# Patient Record
Sex: FEMALE | Race: WHITE | Employment: FULL TIME | URBAN - METROPOLITAN AREA
[De-identification: names, ages, dates, MRNs, and addresses within clinical notes are randomized per-mention and may not be internally consistent; named-entity substitution may affect disease eponyms.]

---

## 2017-01-12 ENCOUNTER — OFFICE VISIT (OUTPATIENT)
Dept: OBGYN CLINIC | Facility: CLINIC | Age: 27
End: 2017-01-12

## 2017-01-12 VITALS
BODY MASS INDEX: 26.82 KG/M2 | DIASTOLIC BLOOD PRESSURE: 52 MMHG | WEIGHT: 151.38 LBS | SYSTOLIC BLOOD PRESSURE: 110 MMHG | HEIGHT: 63 IN

## 2017-01-12 DIAGNOSIS — Z34.03 ENCOUNTER FOR SUPERVISION OF NORMAL FIRST PREGNANCY IN THIRD TRIMESTER: ICD-10-CM

## 2017-01-12 DIAGNOSIS — Z3A.33 33 WEEKS GESTATION OF PREGNANCY: Primary | ICD-10-CM

## 2017-01-12 NOTE — PROGRESS NOTES
BRIGIDO  Doing well, +FM  Denies VB/LOF/uctx  Mode of delivery:   anticipated  RTC 2  Weeks  gbbs on rtc

## 2017-01-12 NOTE — PATIENT INSTRUCTIONS
FETAL MOVEMENT CHART    Begin counting the baby's movements when you awake in the morning, or at approximately 9:00 a.m. Count ten separate times that the baby moves. A movement can be either a kick, a swish, a turn or a flip of the baby inside.     Kassy Hernandez

## 2017-01-18 ENCOUNTER — HOSPITAL ENCOUNTER (INPATIENT)
Facility: HOSPITAL | Age: 27
LOS: 2 days | Discharge: HOME | End: 2017-01-20
Attending: OBSTETRICS & GYNECOLOGY | Admitting: OBSTETRICS & GYNECOLOGY
Payer: COMMERCIAL

## 2017-01-18 PROBLEM — Z34.90 PREGNANCY: Status: ACTIVE | Noted: 2017-01-18

## 2017-01-18 PROBLEM — Z34.90 PREGNANCY (HCC): Status: ACTIVE | Noted: 2017-01-18

## 2017-01-18 LAB
ANTIBODY SCREEN: NEGATIVE
BILIRUBIN URINE: NEGATIVE
BLOOD URINE: NEGATIVE
CONTROL RUN WITHIN 24 HOURS?: YES
ERYTHROCYTE [DISTWIDTH] IN BLOOD BY AUTOMATED COUNT: 11.9 % (ref 11.5–16)
GLUCOSE URINE: NEGATIVE
HCT VFR BLD AUTO: 33.9 % (ref 34–50)
HGB BLD-MCNC: 12.4 G/DL (ref 12–16)
KETONE URINE: NEGATIVE
LEUKOCYTE ESTERASE URINE: NEGATIVE
MCH RBC QN AUTO: 33.2 PG (ref 27–33.2)
MCHC RBC AUTO-ENTMCNC: 36.6 G/DL (ref 31–37)
MCV RBC AUTO: 90.6 FL (ref 81–100)
NITRITE URINE: NEGATIVE
PH URINE: 6.5 (ref 5–8)
PLATELET # BLD AUTO: 127 10(3)UL (ref 150–450)
RBC # BLD AUTO: 3.74 X10(6)UL (ref 3.8–5.1)
RED CELL DISTRIBUTION WIDTH-SD: 39.4 FL (ref 35.1–46.3)
RH BLOOD TYPE: POSITIVE
SPEC GRAVITY: 1.01 (ref 1–1.03)
T PALLIDUM AB SER QL IA: NONREACTIVE
URINE CLARITY: CLEAR
URINE COLOR: YELLOW
UROBILINOGEN URINE: 0.2
WBC # BLD AUTO: 7.5 X10(3) UL (ref 4–13)

## 2017-01-18 PROCEDURE — 0KQM0ZZ REPAIR PERINEUM MUSCLE, OPEN APPROACH: ICD-10-PCS | Performed by: OBSTETRICS & GYNECOLOGY

## 2017-01-18 PROCEDURE — 59400 OBSTETRICAL CARE: CPT | Performed by: OBSTETRICS & GYNECOLOGY

## 2017-01-18 RX ORDER — ONDANSETRON 2 MG/ML
4 INJECTION INTRAMUSCULAR; INTRAVENOUS EVERY 6 HOURS PRN
Status: DISCONTINUED | OUTPATIENT
Start: 2017-01-18 | End: 2017-01-20

## 2017-01-18 RX ORDER — BISACODYL 10 MG
10 SUPPOSITORY, RECTAL RECTAL ONCE AS NEEDED
Status: ACTIVE | OUTPATIENT
Start: 2017-01-18 | End: 2017-01-18

## 2017-01-18 RX ORDER — DIAPER,BRIEF,INFANT-TODD,DISP
EACH MISCELLANEOUS EVERY 6 HOURS PRN
Status: DISCONTINUED | OUTPATIENT
Start: 2017-01-18 | End: 2017-01-20

## 2017-01-18 RX ORDER — DOCUSATE SODIUM 100 MG/1
100 CAPSULE, LIQUID FILLED ORAL
Status: DISCONTINUED | OUTPATIENT
Start: 2017-01-18 | End: 2017-01-20

## 2017-01-18 RX ORDER — ZOLPIDEM TARTRATE 5 MG/1
5 TABLET ORAL NIGHTLY PRN
Status: DISCONTINUED | OUTPATIENT
Start: 2017-01-18 | End: 2017-01-20

## 2017-01-18 RX ORDER — TERBUTALINE SULFATE 1 MG/ML
0.25 INJECTION, SOLUTION SUBCUTANEOUS AS NEEDED
Status: DISCONTINUED | OUTPATIENT
Start: 2017-01-18 | End: 2017-01-18

## 2017-01-18 RX ORDER — IBUPROFEN 600 MG/1
600 TABLET ORAL EVERY 6 HOURS
Status: DISCONTINUED | OUTPATIENT
Start: 2017-01-18 | End: 2017-01-20

## 2017-01-18 RX ORDER — IBUPROFEN 600 MG/1
600 TABLET ORAL ONCE AS NEEDED
Status: DISCONTINUED | OUTPATIENT
Start: 2017-01-18 | End: 2017-01-18

## 2017-01-18 RX ORDER — HYDROCODONE BITARTRATE AND ACETAMINOPHEN 5; 325 MG/1; MG/1
2 TABLET ORAL EVERY 4 HOURS PRN
Status: DISCONTINUED | OUTPATIENT
Start: 2017-01-18 | End: 2017-01-20

## 2017-01-18 RX ORDER — SIMETHICONE 80 MG
80 TABLET,CHEWABLE ORAL 3 TIMES DAILY PRN
Status: DISCONTINUED | OUTPATIENT
Start: 2017-01-18 | End: 2017-01-20

## 2017-01-18 RX ORDER — HYDROCODONE BITARTRATE AND ACETAMINOPHEN 5; 325 MG/1; MG/1
1 TABLET ORAL EVERY 4 HOURS PRN
Status: DISCONTINUED | OUTPATIENT
Start: 2017-01-18 | End: 2017-01-20

## 2017-01-18 RX ORDER — ACETAMINOPHEN 325 MG/1
650 TABLET ORAL EVERY 4 HOURS PRN
Status: DISCONTINUED | OUTPATIENT
Start: 2017-01-18 | End: 2017-01-20

## 2017-01-18 RX ORDER — DEXTROSE, SODIUM CHLORIDE, SODIUM LACTATE, POTASSIUM CHLORIDE, AND CALCIUM CHLORIDE 5; .6; .31; .03; .02 G/100ML; G/100ML; G/100ML; G/100ML; G/100ML
INJECTION, SOLUTION INTRAVENOUS AS NEEDED
Status: DISCONTINUED | OUTPATIENT
Start: 2017-01-18 | End: 2017-01-18

## 2017-01-18 RX ORDER — SODIUM CHLORIDE, SODIUM LACTATE, POTASSIUM CHLORIDE, CALCIUM CHLORIDE 600; 310; 30; 20 MG/100ML; MG/100ML; MG/100ML; MG/100ML
INJECTION, SOLUTION INTRAVENOUS CONTINUOUS
Status: DISCONTINUED | OUTPATIENT
Start: 2017-01-18 | End: 2017-01-18

## 2017-01-18 NOTE — PROGRESS NOTES
Patient slowed breathing pattern/technique. Chest discomfort improved. VS reassessed, pulse ox remains at 100%--Dc'd. Cont. To c/o waves of nausea.

## 2017-01-18 NOTE — H&P
[facility] Shankar Patient Status:  Inpatient    1990 MRN [id number]   [facility] 1NW-A Attending Cary Mcneil MD    0 PCP PHYSICIAN NONSTAFF     CC: patient is here for SROM    SUBJECTIVE 1/17/2017 at Unknown time   Prenatal Vit-Fe Fumarate-FA (PRENATAL VITAMINS PLUS) 27-1 MG Oral Tab Take 1 tablet by mouth daily.  Disp:  Rfl:  1/17/2017 at Unknown time     Allergies:   Antihistamine Decon*        Comment:Tachycardia, as a child    OBJECTIVE

## 2017-01-18 NOTE — PROGRESS NOTES
Delivered viable baby girl at 1152 per . 34.4 gest. 5.0lb 18\" Dr. Isra Hernandez and nicu RN at bedside.

## 2017-01-18 NOTE — PLAN OF CARE
Problem: Patient/Family Goals  Goal: Patient/Family Long Term Goal  Patient’s Long Term Goal: uncomplicated     Interventions:  -   - See additional Care Plan goals for specific interventions   Outcome: Progressing  Goal: Patient/Family Short Term Goal

## 2017-01-18 NOTE — PROGRESS NOTES
admitted to room 113 with reports of SROM clear fluid at 0500. States contractions just started. Denies vaginal bleeding. + fetal movement.

## 2017-01-18 NOTE — PROGRESS NOTES
Patient up to bathroom with assist x 1. Voiding without difficulty. Korin care completed. Patient transferred to mother/baby--- will be going straight to NICU via wheelchair in stable condition. Accompanied by significant other and staff.   Report given to

## 2017-01-18 NOTE — PROGRESS NOTES
PT DROPPED HER BELONGINGS OFF IN 2199 AND WENT STRAIGHT TO NICU ACCOMPANIEDD BY GRICELDA Andrea RN TO VISIT WITH BABY.

## 2017-01-18 NOTE — PLAN OF CARE
ANXIETY    • Will report anxiety at manageable levels Completed        BIRTH - VAGINAL/ SECTION    • Fetal and maternal status remain reassuring during the birth process Completed        PAIN - ADULT    • Verbalizes/displays adequate comfort level

## 2017-01-18 NOTE — PROGRESS NOTES
Pt c/o feeling \"short of breath\" describes difficulty in taking a deep breath. VS reassessed. Pulse oximetry applied. O2 sats 100%. Lungs clear, no adventitous lungs sounds auscultated. Encouraged patient to slow breathing technique.  C/o nausea, no emesi

## 2017-01-18 NOTE — PROGRESS NOTES
PT RETURNED FROM NICU. VOIDED 350 CC'S URINE. PERICARE REINFORCED. SETTLED PT INTO BED. ASSESSMENT COMPLETED. VSS. ORIENTATED PT TO BREAST PUMP. PT WILL PUMP ONCE HER FAMILY LEAVES.

## 2017-01-19 LAB
BASOPHILS # BLD AUTO: 0.06 X10(3) UL (ref 0–0.1)
BASOPHILS NFR BLD AUTO: 0.6 %
EOSINOPHIL # BLD AUTO: 0.1 X10(3) UL (ref 0–0.3)
EOSINOPHIL NFR BLD AUTO: 1.1 %
ERYTHROCYTE [DISTWIDTH] IN BLOOD BY AUTOMATED COUNT: 12.1 % (ref 11.5–16)
HCT VFR BLD AUTO: 26.9 % (ref 34–50)
HGB BLD-MCNC: 9.8 G/DL (ref 12–16)
IMMATURE GRANULOCYTE COUNT: 0.06 X10(3) UL (ref 0–1)
IMMATURE GRANULOCYTE RATIO %: 0.6 %
LYMPHOCYTES # BLD AUTO: 1.89 X10(3) UL (ref 0.9–4)
LYMPHOCYTES NFR BLD AUTO: 20.2 %
MCH RBC QN AUTO: 33.7 PG (ref 27–33.2)
MCHC RBC AUTO-ENTMCNC: 36.4 G/DL (ref 31–37)
MCV RBC AUTO: 92.4 FL (ref 81–100)
MONOCYTES # BLD AUTO: 0.62 X10(3) UL (ref 0.1–0.6)
MONOCYTES NFR BLD AUTO: 6.6 %
NEUTROPHIL ABS PRELIM: 6.63 X10 (3) UL (ref 1.3–6.7)
NEUTROPHILS # BLD AUTO: 6.63 X10(3) UL (ref 1.3–6.7)
NEUTROPHILS NFR BLD AUTO: 70.9 %
PLATELET # BLD AUTO: 117 10(3)UL (ref 150–450)
RBC # BLD AUTO: 2.91 X10(6)UL (ref 3.8–5.1)
RED CELL DISTRIBUTION WIDTH-SD: 40.7 FL (ref 35.1–46.3)
WBC # BLD AUTO: 9.4 X10(3) UL (ref 4–13)

## 2017-01-19 NOTE — PLAN OF CARE
POSTPARTUM    • Long Term Goal:Experiences normal postpartum course Progressing    • Optimize infant feeding at the breast Progressing    • Appropriate maternal -  bonding Progressing

## 2017-01-19 NOTE — PROGRESS NOTES
PPD # 1. Doing okay. Has area just above symphysis that is tender. Not cramping. No associated symptoms Voiding without issue. Bleeding decreased from yesterday.  + Flatus. No BM. Infant daughter stable in NICU.     /79 mmHg  Pulse 50  Temp(Src)

## 2017-01-20 VITALS
SYSTOLIC BLOOD PRESSURE: 113 MMHG | DIASTOLIC BLOOD PRESSURE: 70 MMHG | WEIGHT: 151.38 LBS | HEIGHT: 62.99 IN | HEART RATE: 54 BPM | TEMPERATURE: 98 F | RESPIRATION RATE: 18 BRPM | OXYGEN SATURATION: 100 % | BODY MASS INDEX: 26.82 KG/M2

## 2017-01-20 NOTE — PLAN OF CARE
POSTPARTUM    • Long Term Goal:Experiences normal postpartum course Completed    • Optimize infant feeding at the breast Completed    • Appropriate maternal -  bonding Completed

## 2017-01-20 NOTE — PROGRESS NOTES
PPD # 2. Doing better. Voiding well, but some incontinence as she sits initially. No pain or dysuria. Goes in good amounts. Bleeding decreased.  + Flatus. No BM.     /73 mmHg  Pulse 63  Temp(Src) 97.1 °F (36.2 °C) (Axillary)  Resp 18  Ht 62.99\"

## 2017-01-20 NOTE — PROGRESS NOTES
D/C home. VSS, color pink, skin W/D. Feeding baby well. Adequate diapers. ID bands checked x3. Baby in NICU.

## 2017-01-20 NOTE — PROGRESS NOTES
Discharge teaching on mom and baby care completed. All questions and concerns addressed. Pt verbalizes good understanding on information given.  Anticipating discharge later today  Baby remains in nicu for now, doing well, mom pumping and doing sts Doing pu

## 2017-01-20 NOTE — CM/SW NOTE
01/20/17 1000   CM/SW Referral Data   Referral Source Nurse;Family; Social Work (self-referral)   Reason for Referral Discharge planning;Psychoscial assessment   Informant Patient     SW completed an assessment with pt who presents with bright affect and

## 2017-01-20 NOTE — CM/SW NOTE
CM met with patient in the NICU and reviewed insurance and PCP for infant. Patient stated that they will add infant to their insurance [employer].  CM reminded patient that she should call insurance just to let them know that she delivered infant and is inpatient,

## 2017-01-23 ENCOUNTER — TELEPHONE (OUTPATIENT)
Dept: OBGYN CLINIC | Facility: CLINIC | Age: 27
End: 2017-01-23

## 2017-01-23 ENCOUNTER — TELEPHONE (OUTPATIENT)
Dept: OBGYN UNIT | Facility: HOSPITAL | Age: 27
End: 2017-01-23

## 2017-01-23 NOTE — TELEPHONE ENCOUNTER
Patient had her baby 6 wks early, Dr. Cecily Young Discharged her 1/20/17. Still having the abdominal pain, and there is lump on her skin. Needs advise. Patient appointment for her post partum is on February 16th.

## 2017-01-23 NOTE — TELEPHONE ENCOUNTER
Pt s/p  17. Pt delivered 6 wks early. C/o palpable 1-1/2 inch \"lump\" midway between  umbilicus and pelvis. Pt reports area is tender with palpation. 4/10. Pt denies temp/chills/n/v/d.  Pt is taking ibuprofen without   Much relief but only aditi

## 2017-01-23 NOTE — PROGRESS NOTES
Reviewed self care w / mom, she verbalizes understanding of instructions reviewed. Encourage to follow up w/ MDs as directed and w/ questions/concerns. Baby remains in nicu for now. Pt denies any HTN issues during pregnancy or after delivery.

## 2017-01-24 ENCOUNTER — TELEPHONE (OUTPATIENT)
Dept: OBGYN CLINIC | Facility: CLINIC | Age: 27
End: 2017-01-24

## 2017-01-24 ENCOUNTER — OFFICE VISIT (OUTPATIENT)
Dept: OBGYN CLINIC | Facility: CLINIC | Age: 27
End: 2017-01-24

## 2017-01-24 VITALS
BODY MASS INDEX: 24.45 KG/M2 | WEIGHT: 138 LBS | HEIGHT: 63 IN | DIASTOLIC BLOOD PRESSURE: 64 MMHG | SYSTOLIC BLOOD PRESSURE: 110 MMHG | HEART RATE: 74 BPM

## 2017-01-24 DIAGNOSIS — O23.90: Primary | ICD-10-CM

## 2017-01-24 PROCEDURE — 99213 OFFICE O/P EST LOW 20 MIN: CPT | Performed by: NURSE PRACTITIONER

## 2017-01-24 RX ORDER — IBUPROFEN 600 MG/1
600 TABLET ORAL EVERY 6 HOURS PRN
COMMUNITY
End: 2017-01-31

## 2017-01-24 RX ORDER — CEPHALEXIN 500 MG/1
500 CAPSULE ORAL 2 TIMES DAILY
Qty: 14 CAPSULE | Refills: 0 | Status: SHIPPED | OUTPATIENT
Start: 2017-01-24 | End: 2017-01-31 | Stop reason: ALTCHOICE

## 2017-01-24 RX ORDER — BREAST PUMP
EACH MISCELLANEOUS
Qty: 1 EACH | Refills: 0 | OUTPATIENT
Start: 2017-01-24 | End: 2017-05-22

## 2017-01-24 NOTE — TELEPHONE ENCOUNTER
Pt requesting apt 1/24/17. Dr. Kristen Ruiz is out of office and will return on 1/25/17. PSR- please call pt to schedule post-partum prob apt with Mahnaz Ndiaye.

## 2017-01-24 NOTE — PROGRESS NOTES
S:  Patient here concerned with hard, tender lump to abdomen. States started after 34 + week  last week 17. Denies fevers, unusual discharge, or odor. Still steadily bleeding, not soaking a pad and hour. Has 1 daily blood clot.   Baby is in NICU fo

## 2017-01-24 NOTE — TELEPHONE ENCOUNTER
Sounds like hernia. Okay to see Cristopher Loosen unless delivering physician wants to see her Vera Cabral.

## 2017-01-24 NOTE — PATIENT INSTRUCTIONS
1. Antibiotics as direct  2. Ibuprofen 600 mg at least twice daily  3. Continue Korin- care as directed  4.  Call with any fevers, flu- like symptoms, increased pain or bleeding

## 2017-01-28 ENCOUNTER — APPOINTMENT (OUTPATIENT)
Dept: LACTATION | Facility: HOSPITAL | Age: 27
End: 2017-01-28
Attending: OBSTETRICS & GYNECOLOGY
Payer: COMMERCIAL

## 2017-01-31 ENCOUNTER — OFFICE VISIT (OUTPATIENT)
Dept: OBGYN CLINIC | Facility: CLINIC | Age: 27
End: 2017-01-31

## 2017-01-31 VITALS
SYSTOLIC BLOOD PRESSURE: 110 MMHG | BODY MASS INDEX: 23.74 KG/M2 | HEART RATE: 86 BPM | HEIGHT: 63 IN | WEIGHT: 134 LBS | DIASTOLIC BLOOD PRESSURE: 60 MMHG

## 2017-01-31 DIAGNOSIS — O23.90: Primary | ICD-10-CM

## 2017-01-31 NOTE — PROGRESS NOTES
Feeling much better  Finished antibiotics    Abdomen soft    PP visit in 2 weeks  Angie Gore still in NICU, doing well

## 2017-02-12 ENCOUNTER — TELEPHONE (OUTPATIENT)
Dept: OBGYN UNIT | Facility: HOSPITAL | Age: 27
End: 2017-02-12

## 2017-02-12 NOTE — PROGRESS NOTES
2 wk call. Baby having a few issues in NICU. Pt doing well. Great stay @ [facility]. Encouraged to return survey.

## 2017-02-15 ENCOUNTER — TELEPHONE (OUTPATIENT)
Dept: OBGYN CLINIC | Facility: CLINIC | Age: 27
End: 2017-02-15

## 2017-02-15 NOTE — TELEPHONE ENCOUNTER
Patient called, she notice it yesterday and up til now she has redness on her right breast and she is breast feeding. When she press on the area, it's painful. No fever. Please advise.   She has an appointment with Dr. Brandon Groves tomorrow for her post partum

## 2017-02-15 NOTE — TELEPHONE ENCOUNTER
Pt currently breastfeeding; baby in NICU. Pt c/o reddened area to underside of right breast; tender to the touch, started yesterday. Pt denies any lumps or red streaks. Pt denies fever, chills or body aches.   Pt was examined by lactation consultant today

## 2017-02-16 ENCOUNTER — OFFICE VISIT (OUTPATIENT)
Dept: OBGYN CLINIC | Facility: CLINIC | Age: 27
End: 2017-02-16

## 2017-02-16 VITALS
HEIGHT: 63 IN | SYSTOLIC BLOOD PRESSURE: 96 MMHG | BODY MASS INDEX: 24.1 KG/M2 | DIASTOLIC BLOOD PRESSURE: 60 MMHG | WEIGHT: 136 LBS

## 2017-02-16 PROBLEM — Z34.90 PREGNANCY: Status: RESOLVED | Noted: 2017-01-18 | Resolved: 2017-02-16

## 2017-02-16 PROBLEM — Z34.90 PREGNANCY (HCC): Status: RESOLVED | Noted: 2017-01-18 | Resolved: 2017-02-16

## 2017-02-16 RX ORDER — ACETAMINOPHEN AND CODEINE PHOSPHATE 120; 12 MG/5ML; MG/5ML
0.35 SOLUTION ORAL DAILY
Qty: 3 PACKAGE | Refills: 1 | Status: SHIPPED | OUTPATIENT
Start: 2017-02-16 | End: 2017-03-16

## 2017-02-16 NOTE — PROGRESS NOTES
Here for postpartum visit S/P vaginal delivery due to 34 week PPROM and active labor on January 18th. Is pumping and breast feeding. Infant still in NICU. Voiding w/o issues. BM's daily but hx of constipation in past and feels not evacuating completely.

## 2017-03-10 ENCOUNTER — NURSE ONLY (OUTPATIENT)
Dept: LACTATION | Facility: HOSPITAL | Age: 27
End: 2017-03-10
Attending: OBSTETRICS & GYNECOLOGY
Payer: COMMERCIAL

## 2017-03-10 VITALS — TEMPERATURE: 98 F

## 2017-03-10 PROCEDURE — 99212 OFFICE O/P EST SF 10 MIN: CPT

## 2017-03-10 NOTE — PATIENT INSTRUCTIONS
Breastfeeding suggestions for home  Kangaroo mother care: Snuggle your baby between your breasts in just a diaper and covered with a blanket. Helps to wake a sleepy baby and increases your milk supply. Massage your breasts before nursing or pumping.   Marisa \"guzzling\" the feeding, allowing infant to take at least 15 minutes to drink the breastmilk or formula. Milk Supply:   · Continue breast massage before nursing and pumping, skin to skin contact with baby as much as possible.    · Continue to pump one

## 2017-04-25 ENCOUNTER — HOSPITAL ENCOUNTER (OUTPATIENT)
Age: 27
Discharge: HOME | End: 2017-04-25
Payer: COMMERCIAL

## 2017-04-25 VITALS
TEMPERATURE: 98 F | HEART RATE: 80 BPM | DIASTOLIC BLOOD PRESSURE: 72 MMHG | RESPIRATION RATE: 18 BRPM | OXYGEN SATURATION: 99 % | SYSTOLIC BLOOD PRESSURE: 110 MMHG

## 2017-04-25 DIAGNOSIS — B37.9 CANDIDA INFECTION: ICD-10-CM

## 2017-04-25 DIAGNOSIS — G56.01 CARPAL TUNNEL SYNDROME, RIGHT: Primary | ICD-10-CM

## 2017-04-25 PROCEDURE — 99203 OFFICE O/P NEW LOW 30 MIN: CPT

## 2017-04-25 PROCEDURE — 99212 OFFICE O/P EST SF 10 MIN: CPT

## 2017-04-25 PROCEDURE — 99213 OFFICE O/P EST LOW 20 MIN: CPT

## 2017-04-25 PROCEDURE — 99204 OFFICE O/P NEW MOD 45 MIN: CPT

## 2017-04-25 RX ORDER — NYSTATIN 100000 U/G
1 OINTMENT TOPICAL 2 TIMES DAILY
Qty: 30 G | Refills: 0 | Status: SHIPPED | OUTPATIENT
Start: 2017-04-25 | End: 2017-05-22

## 2017-04-25 RX ORDER — IBUPROFEN 600 MG/1
600 TABLET ORAL EVERY 8 HOURS PRN
Qty: 30 TABLET | Refills: 0 | Status: SHIPPED | OUTPATIENT
Start: 2017-04-25 | End: 2017-05-02

## 2017-04-25 NOTE — ED PROVIDER NOTES
Patient Seen in: [facility] [place]    History   Patient presents with:  Numbness    Stated Complaint: HAND NUMBNESS X1 WEEK    HPI    A 61-year-old female who comes in today complete of numbness and tingling in the right hand especiall Systems    Positive for stated complaint: HAND NUMBNESS X1 WEEK  Other systems are as noted in HPI. Constitutional and vital signs reviewed. All other systems reviewed and negative except as noted above.     [facility] elements reviewed from today and agree discharge.  I discuss the plan of care with the patient, who expresses understanding.  All questions and concerns are addressed to the patient's satisfaction prior to discharge today.       Disposition and Plan     Clinical Impression:  Carpal tunnel syndro

## 2017-04-25 NOTE — ED INITIAL ASSESSMENT (HPI)
Pt has had rt hand numbness and tingling which is constant for the past week or so.   She is 3 months post partum, breastfeeding , and RN noticed she carries baby and carrier on her rt forearm

## 2017-05-22 ENCOUNTER — OFFICE VISIT (OUTPATIENT)
Dept: OBGYN CLINIC | Facility: CLINIC | Age: 27
End: 2017-05-22

## 2017-05-22 VITALS
BODY MASS INDEX: 22.86 KG/M2 | HEIGHT: 63 IN | WEIGHT: 129 LBS | SYSTOLIC BLOOD PRESSURE: 100 MMHG | HEART RATE: 98 BPM | DIASTOLIC BLOOD PRESSURE: 66 MMHG

## 2017-05-22 DIAGNOSIS — N94.11 INTROITAL DYSPAREUNIA: ICD-10-CM

## 2017-05-22 DIAGNOSIS — Z12.4 ROUTINE PAPANICOLAOU SMEAR: ICD-10-CM

## 2017-05-22 DIAGNOSIS — Z01.419 WELL WOMAN EXAM WITH ROUTINE GYNECOLOGICAL EXAM: Primary | ICD-10-CM

## 2017-05-22 PROCEDURE — 99395 PREV VISIT EST AGE 18-39: CPT | Performed by: OBSTETRICS & GYNECOLOGY

## 2017-05-22 PROCEDURE — 88175 CYTOPATH C/V AUTO FLUID REDO: CPT | Performed by: OBSTETRICS & GYNECOLOGY

## 2017-05-22 RX ORDER — NORETHINDRONE 0.35 MG/1
TABLET ORAL
COMMUNITY
Start: 2017-04-25 | End: 2017-05-22

## 2017-05-22 RX ORDER — DROSPIRENONE AND ETHINYL ESTRADIOL 0.03MG-3MG
1 KIT ORAL DAILY
Qty: 3 PACKAGE | Refills: 0 | Status: SHIPPED | OUTPATIENT
Start: 2017-05-22 | End: 2017-06-23

## 2017-05-22 NOTE — PROGRESS NOTES
Patient ID:   Corinne Hurt  is a 27year old female         HPI:     Here for general gyn exam and pap smear.  Last seen 2016 for postpartum exam. Continues to breast fed and having some post nursing engorgement as daughter going longer b child       Current Outpatient Prescriptions on File Prior to Visit:  Prenatal Vit-Fe Fumarate-FA (PRENATAL VITAMINS PLUS) 27-1 MG Oral Tab Take 1 tablet by mouth daily. Disp:  Rfl:      No current facility-administered medications on file prior to visit. In meantime, lubricant and massage in area prior to intercourse.

## 2017-05-23 ENCOUNTER — PATIENT MESSAGE (OUTPATIENT)
Dept: OBGYN CLINIC | Facility: CLINIC | Age: 27
End: 2017-05-23

## 2017-05-23 NOTE — TELEPHONE ENCOUNTER
From: Blaze Hernandez  To: Collette Dire, MD  Sent: 5/23/2017 10:29 AM CDT  Subject: Test Results Question    I just looked at my tests results page and the placenta pathology results are still not there.  Dr. Magi Reynolds said he released them to me, but I d

## 2017-06-14 ENCOUNTER — TELEPHONE (OUTPATIENT)
Dept: OBGYN CLINIC | Facility: CLINIC | Age: 27
End: 2017-06-14

## 2017-06-14 DIAGNOSIS — Z80.3 FAMILY HISTORY OF BREAST CANCER: Primary | ICD-10-CM

## 2017-06-14 NOTE — TELEPHONE ENCOUNTER
Lucille Waters info give to patient. Patient encouraged to check with her insurance to see what her out-of-pocket expenses with be.

## 2017-06-15 ENCOUNTER — TELEPHONE (OUTPATIENT)
Dept: OBGYN CLINIC | Facility: CLINIC | Age: 27
End: 2017-06-15

## 2017-06-15 NOTE — TELEPHONE ENCOUNTER
Patient delivered on 01/18/17. She stated she has been having pain in right breast.  She notices it most when she is empty. Denies any redness, streaking, fever, etc.  States she does notice a \"hard spot\" on right breast.  She is nursing & pumping.  Not

## 2017-06-15 NOTE — TELEPHONE ENCOUNTER
Pt calling and has a pain in her right breast    Pt is breast feeding  Pt has had for about 1 week    Please call

## 2017-06-23 ENCOUNTER — OFFICE VISIT (OUTPATIENT)
Dept: HEMATOLOGY/ONCOLOGY | Facility: HOSPITAL | Age: 27
End: 2017-06-23
Attending: SPECIALIST
Payer: COMMERCIAL

## 2017-06-23 VITALS
RESPIRATION RATE: 18 BRPM | DIASTOLIC BLOOD PRESSURE: 69 MMHG | SYSTOLIC BLOOD PRESSURE: 110 MMHG | TEMPERATURE: 98 F | WEIGHT: 128 LBS | BODY MASS INDEX: 23 KG/M2 | HEART RATE: 64 BPM

## 2017-06-23 DIAGNOSIS — IMO0001: ICD-10-CM

## 2017-06-23 DIAGNOSIS — Z80.3 FAMILY HISTORY OF BREAST CANCER: Primary | ICD-10-CM

## 2017-06-23 PROCEDURE — 99244 OFF/OP CNSLTJ NEW/EST MOD 40: CPT | Performed by: SPECIALIST

## 2017-06-23 NOTE — PROGRESS NOTES
Referring Provider: Kajal Harris MD    Additional Provider: Jareth Dias MD    Reason for Referral:  Robert Card was referred for genetic counseling because of a family history of breast cancer.   Ms. Roshan Jacob is a 27year-old woman of [employer] [facility] common cancers occurring at unusually young ages, multiple primary cancers in the some individual, or the same type of cancer or related cancers (e.g., breast and ovarian, colorectal and endometrial) in three or more individuals in the same lineage.   Mutat syndrome involving a different gene, or that her paternal relatives had breast cancer due to a BRCA1/2 mutation that Ms. Shankar did not inherit.   If no genetic mutation is identified, options for cancer screening/management should be determined according t was referred for genetic counseling because of a family history of breast cancer. Her reported family history is not highly suspicious for a hereditary cancer syndrome; however, Ms. Shankar’s paternal great-grandfather was of Jehovah's witness descent and Ms. Shankar’

## 2017-06-23 NOTE — PROGRESS NOTES
Patient is here today for Genetic Consult with Ariel Schwab and Renea Dangelo. Patient denies pain. Medication list and medical history were reviewed and updated.     Education Record    Learner:  Patient    Disease / Diagnosis: Genetic Consult    [place]

## 2017-07-09 NOTE — PROGRESS NOTES
[facility] Report of Consultation      Patient Name: Deonte Aponte   YOB: 1990  Medical Record Number: [id number]  Consulting Physician: Eh Campos. Rohan Barney M.D. Referring Physician: Jl Levin M.D.     Date of Consultation: 6/2 FENUGREEK OR Take 1 Dose by mouth 3 (three) times daily. Disp:  Rfl:    Prenatal Vit-Fe Fumarate-FA (PRENATAL VITAMINS PLUS) 27-1 MG Oral Tab Take 1 tablet by mouth daily. Disp:  Rfl:      Allergies   Ms. Shankar is allergic to antihistamine decongestant the genes, BRCA1 and BRCA2, account for the majority of hereditary breast and ovarian cancer families. Mutations in genes other than BRCA1/2 are identified in 3-10% of individuals tested using a multigene panel.       Ms. Shankar’s reported family history be discussed with a physician.       If Ms. Shankar is found to carry a deleterious mutation in the BRCA1/2 genes, she is at significantly increased risk for breast, ovarian, and other cancers.  This knowledge would influence her medical recommendations and 6-12 months beginning at the age of 22 years. 3. Imaging studies with annual MRI beginning at the age of 22 years. Beginning at age 27 years, annual mammography and annual MRI are advised such that breast imaging is performed every 6 months.  Screening ma descent and Ms. Shankar’s paternal grandmother and aunt had breast cancer. Genetic testing on Ms. Shankar for the three Ashkenazi [employer] BRCA1/2  mutations is indicated. Blood was drawn for this purpose.  We expect results within 3 weeks.      Planned

## 2017-07-12 ENCOUNTER — GENETICS ENCOUNTER (OUTPATIENT)
Dept: HEMATOLOGY/ONCOLOGY | Facility: HOSPITAL | Age: 27
End: 2017-07-12

## 2018-04-13 ENCOUNTER — LAB ENCOUNTER (OUTPATIENT)
Dept: LAB | Age: 28
End: 2018-04-13
Attending: INTERNAL MEDICINE
Payer: COMMERCIAL

## 2018-04-13 ENCOUNTER — OFFICE VISIT (OUTPATIENT)
Dept: INTERNAL MEDICINE CLINIC | Facility: CLINIC | Age: 28
End: 2018-04-13

## 2018-04-13 VITALS
TEMPERATURE: 99 F | HEIGHT: 63 IN | RESPIRATION RATE: 16 BRPM | OXYGEN SATURATION: 99 % | BODY MASS INDEX: 22.86 KG/M2 | WEIGHT: 129 LBS

## 2018-04-13 DIAGNOSIS — R06.00 DYSPNEA ON EXERTION: ICD-10-CM

## 2018-04-13 DIAGNOSIS — D64.9 ANEMIA, UNSPECIFIED TYPE: ICD-10-CM

## 2018-04-13 DIAGNOSIS — R42 VERTIGO: Primary | ICD-10-CM

## 2018-04-13 DIAGNOSIS — E55.9 HYPOVITAMINOSIS D: ICD-10-CM

## 2018-04-13 PROCEDURE — 99214 OFFICE O/P EST MOD 30 MIN: CPT | Performed by: INTERNAL MEDICINE

## 2018-04-13 PROCEDURE — 80053 COMPREHEN METABOLIC PANEL: CPT | Performed by: INTERNAL MEDICINE

## 2018-04-13 PROCEDURE — 36415 COLL VENOUS BLD VENIPUNCTURE: CPT | Performed by: INTERNAL MEDICINE

## 2018-04-13 PROCEDURE — 82306 VITAMIN D 25 HYDROXY: CPT | Performed by: INTERNAL MEDICINE

## 2018-04-13 PROCEDURE — 85025 COMPLETE CBC W/AUTO DIFF WBC: CPT | Performed by: INTERNAL MEDICINE

## 2018-04-13 RX ORDER — BUPRENORPHINE HCL 8 MG/1
1 TABLET SUBLINGUAL DAILY
COMMUNITY
End: 2019-02-21 | Stop reason: DRUGHIGH

## 2018-04-13 RX ORDER — MECLIZINE HYDROCHLORIDE 25 MG/1
25 TABLET ORAL 3 TIMES DAILY PRN
Qty: 20 TABLET | Refills: 0 | Status: SHIPPED | OUTPATIENT
Start: 2018-04-13 | End: 2018-04-24

## 2018-04-13 NOTE — PROGRESS NOTES
[facility] Internal Medicine Office Note  Chief Complaint:   Patient presents with:  Dizziness: all day long  Breathing Problem: when doing activities, also is shaky      HPI:   This is a 32year old female new patient coming in for dizziness  HP (CENTRUM SILVER) Oral Tab Take 1 tablet by mouth daily. Disp:  Rfl:    Meclizine HCl 25 MG Oral Tab Take 1 tablet (25 mg total) by mouth 3 (three) times daily as needed.  Disp: 20 tablet Rfl: 0         REVIEW OF SYSTEMS:   Review of Systems   Constitutional for this visit:    Vertigo - symptoms c/w vestibular neuritis. Can take meclizine for severe vertigo but discussed this may prolong symptoms. She declines anti-nausea medication.  f/u ENT for further eval  -     ENT - INTERNAL    Anemia, unspecified type -

## 2018-04-13 NOTE — PATIENT INSTRUCTIONS
Meclizine - take sparingly as needed for vertigo  Can cause side effects of drowsiness or nausea    Follow up with ENT [facility]/Dr. Ivette Del Rio or   Dr. Tami Benson [phone]

## 2018-04-17 ENCOUNTER — PATIENT MESSAGE (OUTPATIENT)
Dept: INTERNAL MEDICINE CLINIC | Facility: CLINIC | Age: 28
End: 2018-04-17

## 2018-04-17 NOTE — TELEPHONE ENCOUNTER
From: Jenny Thrasher  To: Ganga Chang MD  Sent: 4/17/2018 1:16 PM CDT  Subject: Test Results Question    Hi Dr. Hayes Parents,  I got the blood test results. I will begin taking a vitamin D supplement. Quick question.   I noticed my WBC are on the borderline-

## 2018-04-24 ENCOUNTER — OFFICE VISIT (OUTPATIENT)
Dept: OBGYN CLINIC | Facility: CLINIC | Age: 28
End: 2018-04-24

## 2018-04-24 VITALS
WEIGHT: 131 LBS | SYSTOLIC BLOOD PRESSURE: 110 MMHG | HEIGHT: 63 IN | DIASTOLIC BLOOD PRESSURE: 56 MMHG | BODY MASS INDEX: 23.21 KG/M2

## 2018-04-24 DIAGNOSIS — Z01.419 WELL WOMAN EXAM WITH ROUTINE GYNECOLOGICAL EXAM: ICD-10-CM

## 2018-04-24 DIAGNOSIS — Z30.41 ENCOUNTER FOR SURVEILLANCE OF CONTRACEPTIVE PILLS: Primary | ICD-10-CM

## 2018-04-24 DIAGNOSIS — Z12.4 CERVICAL CANCER SCREENING: ICD-10-CM

## 2018-04-24 PROCEDURE — 88175 CYTOPATH C/V AUTO FLUID REDO: CPT | Performed by: OBSTETRICS & GYNECOLOGY

## 2018-04-24 PROCEDURE — 99395 PREV VISIT EST AGE 18-39: CPT | Performed by: OBSTETRICS & GYNECOLOGY

## 2018-04-24 RX ORDER — DROSPIRENONE AND ETHINYL ESTRADIOL 0.03MG-3MG
1 KIT ORAL DAILY
COMMUNITY
End: 2018-04-24

## 2018-04-24 RX ORDER — DROSPIRENONE AND ETHINYL ESTRADIOL 0.03MG-3MG
1 KIT ORAL DAILY
Qty: 3 PACKAGE | Refills: 3 | Status: SHIPPED | OUTPATIENT
Start: 2018-04-24 | End: 2019-04-05

## 2018-04-24 NOTE — PROGRESS NOTES
[facility]  Obstetrics and Gynecology  History & Physical    CC: Patient is here for annual well woman exam     Subjective:     HPI: Jannette Davis is a 32year old  female here for annual well women exam.  Patient reports pain with inte Spouse name: N/A    Years of education: N/A  Number of children: N/A     Occupational History  None on file     Social History Main Topics   Smoking status: Never Smoker    Smokeless tobacco: Never Used    Alcohol use Yes  0.0 oz/week    2 Glasses of wine denies history of excessive bleeding or bruising      Objective:      04/24/18  1115   BP: 110/56   Weight: 131 lb   Height: 63\"       Body mass index is 23.21 kg/m².     General: AAO.NAD.   CVS exam: RRR   Chest: CTAB   Breast: symmetric, no dominant or

## 2018-05-15 ENCOUNTER — HOSPITAL ENCOUNTER (OUTPATIENT)
Dept: MRI IMAGING | Facility: HOSPITAL | Age: 28
Discharge: HOME | End: 2018-05-15
Attending: OTOLARYNGOLOGY
Payer: COMMERCIAL

## 2018-05-15 DIAGNOSIS — R42 DIZZINESS: ICD-10-CM

## 2018-05-15 PROCEDURE — A9576 INJ PROHANCE MULTIPACK: HCPCS | Performed by: OTOLARYNGOLOGY

## 2018-05-15 PROCEDURE — 70553 MRI BRAIN STEM W/O & W/DYE: CPT | Performed by: OTOLARYNGOLOGY

## 2018-05-17 ENCOUNTER — PATIENT MESSAGE (OUTPATIENT)
Dept: INTERNAL MEDICINE CLINIC | Facility: CLINIC | Age: 28
End: 2018-05-17

## 2018-05-17 ENCOUNTER — OFFICE VISIT (OUTPATIENT)
Dept: INTERNAL MEDICINE CLINIC | Facility: CLINIC | Age: 28
End: 2018-05-17

## 2018-05-17 ENCOUNTER — LAB ENCOUNTER (OUTPATIENT)
Dept: LAB | Age: 28
End: 2018-05-17
Attending: INTERNAL MEDICINE
Payer: COMMERCIAL

## 2018-05-17 VITALS
RESPIRATION RATE: 16 BRPM | TEMPERATURE: 98 F | SYSTOLIC BLOOD PRESSURE: 92 MMHG | DIASTOLIC BLOOD PRESSURE: 56 MMHG | WEIGHT: 130.75 LBS | HEIGHT: 63 IN | HEART RATE: 88 BPM | BODY MASS INDEX: 23.17 KG/M2

## 2018-05-17 DIAGNOSIS — R53.83 FATIGUE, UNSPECIFIED TYPE: ICD-10-CM

## 2018-05-17 DIAGNOSIS — R93.89 ABNORMAL MRI: Primary | ICD-10-CM

## 2018-05-17 DIAGNOSIS — R20.2 TINGLING IN EXTREMITIES: ICD-10-CM

## 2018-05-17 PROCEDURE — 99214 OFFICE O/P EST MOD 30 MIN: CPT | Performed by: INTERNAL MEDICINE

## 2018-05-17 PROCEDURE — 82607 VITAMIN B-12: CPT | Performed by: INTERNAL MEDICINE

## 2018-05-17 PROCEDURE — 85025 COMPLETE CBC W/AUTO DIFF WBC: CPT | Performed by: INTERNAL MEDICINE

## 2018-05-17 PROCEDURE — 36415 COLL VENOUS BLD VENIPUNCTURE: CPT | Performed by: INTERNAL MEDICINE

## 2018-05-17 NOTE — PROGRESS NOTES
[facility] Internal Medicine Office Note  Chief Complaint:   Patient presents with:   Follow - Up: MRI results       HPI:   This is a 32year old female coming in for f/u of abnormal MRI  HPI  Abnormal MRI - foci of T2 hyperintense signal    Verti A-Fib as well   • Ear Problems Father    • Heart Disorder Father    • Hypertension Mother    • Cancer Mother 59     breast cancer   • Clotting Disorder Mother    • Heart Disorder Maternal Grandmother    • Diabetes Paternal Grandmother    • Heart Disorder P is 23.16 kg/m² as calculated from the following:    Height as of this encounter: 63\". Weight as of this encounter: 130 lb 12 oz. Vital signs reviewed. Appears stated age, well groomed. Physical Exam   Vitals reviewed.    Constitutional: She appears w of 4.2 (lower limit of normal is 4.0) and would like re-testing  -     CBC WITH DIFFERENTIAL WITH PLATELET;  Future      Orders Placed This Encounter      CBC W Differential W Platelet [E]      Vitamin B12 [E]    Meds & Refills for this Visit:    No prescri

## 2018-05-19 ENCOUNTER — NURSE ONLY (OUTPATIENT)
Dept: INTERNAL MEDICINE CLINIC | Facility: CLINIC | Age: 28
End: 2018-05-19

## 2018-05-19 DIAGNOSIS — E53.8 B12 DEFICIENCY: Primary | ICD-10-CM

## 2018-05-19 PROCEDURE — 96372 THER/PROPH/DIAG INJ SC/IM: CPT | Performed by: INTERNAL MEDICINE

## 2018-05-19 RX ORDER — CYANOCOBALAMIN 1000 UG/ML
1000 INJECTION INTRAMUSCULAR; SUBCUTANEOUS ONCE
Status: COMPLETED | OUTPATIENT
Start: 2018-05-19 | End: 2018-05-19

## 2018-05-19 RX ADMIN — CYANOCOBALAMIN 1000 MCG: 1000 INJECTION INTRAMUSCULAR; SUBCUTANEOUS at 11:32:00

## 2018-05-19 NOTE — PROGRESS NOTES
Administered Vitamin B12 injection on right deltoid without incident. Repeat in 1 month - advised pt to schedule an appt.

## 2018-06-19 ENCOUNTER — NURSE ONLY (OUTPATIENT)
Dept: INTERNAL MEDICINE CLINIC | Facility: CLINIC | Age: 28
End: 2018-06-19

## 2018-06-19 PROCEDURE — 96372 THER/PROPH/DIAG INJ SC/IM: CPT | Performed by: INTERNAL MEDICINE

## 2018-06-19 RX ADMIN — CYANOCOBALAMIN 1000 MCG: 1000 INJECTION INTRAMUSCULAR; SUBCUTANEOUS at 09:51:00

## 2018-06-21 ENCOUNTER — LAB ENCOUNTER (OUTPATIENT)
Dept: LAB | Age: 28
End: 2018-06-21
Attending: OTOLARYNGOLOGY
Payer: COMMERCIAL

## 2018-06-21 DIAGNOSIS — J30.1 SEASONAL ALLERGIC RHINITIS DUE TO POLLEN: ICD-10-CM

## 2018-06-21 DIAGNOSIS — R79.9 ABNORMAL BLOOD CHEMISTRY LEVEL: ICD-10-CM

## 2018-06-21 PROCEDURE — 85025 COMPLETE CBC W/AUTO DIFF WBC: CPT | Performed by: INTERNAL MEDICINE

## 2018-06-21 PROCEDURE — 36415 COLL VENOUS BLD VENIPUNCTURE: CPT | Performed by: INTERNAL MEDICINE

## 2018-06-26 ENCOUNTER — OFFICE VISIT (OUTPATIENT)
Dept: NEUROLOGY | Facility: CLINIC | Age: 28
End: 2018-06-26

## 2018-06-26 VITALS
DIASTOLIC BLOOD PRESSURE: 76 MMHG | HEART RATE: 78 BPM | HEIGHT: 63 IN | WEIGHT: 136 LBS | BODY MASS INDEX: 24.1 KG/M2 | SYSTOLIC BLOOD PRESSURE: 112 MMHG | RESPIRATION RATE: 16 BRPM

## 2018-06-26 DIAGNOSIS — E53.8 LOW VITAMIN B12 LEVEL: ICD-10-CM

## 2018-06-26 DIAGNOSIS — R90.82 WHITE MATTER ABNORMALITY ON MRI OF BRAIN: Primary | ICD-10-CM

## 2018-06-26 PROCEDURE — 99244 OFF/OP CNSLTJ NEW/EST MOD 40: CPT | Performed by: OTHER

## 2018-06-26 NOTE — PATIENT INSTRUCTIONS
Refill policies:    • Allow 2-3 business days for refills; controlled substances may take longer.   • Contact your pharmacy at least 5 days prior to running out of medication and have them send an electronic request or submit request through the “request re entire amount billed. Precertification and Prior Authorizations: If your physician has recommended that you have a procedure or additional testing performed.   [facility]) will contact your insurance carrier to obtain pre-certi

## 2018-06-26 NOTE — PROGRESS NOTES
HPI:    Patient ID: Corina Haley is a 32year old female. Referring provider: Dr Shivani Abreu is a 32year old female who presented for evaluation of white matter lesion that was found on recent MRI IAC obtained for dizziness and vertigo. Diabetes Paternal Grandfather    • Heart Disorder Paternal Grandfather    • Cancer Paternal Aunt      breast   • Diabetes Paternal Aunt    • Diabetes Paternal Uncle    • Heart Disorder Paternal Uncle    • Cancer Paternal Aunt       Smoking status: Never Sm sounds normal.   Abdominal: Soft. Bowel sounds are normal.   Skin: Skin is warm and dry. Psychiatric:  normal mood and affect. Neurological   Awake, alert and oriented to time, place and person.    Speech is fluent with intact comprehension, repetition in the spinal cord. Diffuse white matter hyper intensities have been reported but I do not think that the small white matter lesions seen here is related to low B12 level. It is a nonspecific finding at this time.     I would like her to follow up with me i

## 2018-06-28 ENCOUNTER — TELEPHONE (OUTPATIENT)
Dept: INTERNAL MEDICINE CLINIC | Facility: CLINIC | Age: 28
End: 2018-06-28

## 2018-06-28 DIAGNOSIS — D72.819 LEUKOPENIA, UNSPECIFIED TYPE: Primary | ICD-10-CM

## 2018-06-28 NOTE — TELEPHONE ENCOUNTER
Patient is requesting to speak with a nurse to go over her recent lab results. Patient stated that she is upset that it is taking so long to get her results back. Please advise.

## 2018-06-28 NOTE — TELEPHONE ENCOUNTER
Low white blood cell count is essentially unchanged  The other counts are normal and this is a very mild leukopenia so it is ok to recheck in 12 months  If patient is very concerned, can see hematology for reassurance    Ca+ level ordered by another provid

## 2018-06-28 NOTE — TELEPHONE ENCOUNTER
Pt notified of results and instructions. Pt verbalized concern about her low wbc. Referral for hematology placed per pt request.   Pt to call office with any other questions or concerns.

## 2018-07-19 ENCOUNTER — NURSE ONLY (OUTPATIENT)
Dept: INTERNAL MEDICINE CLINIC | Facility: CLINIC | Age: 28
End: 2018-07-19
Payer: COMMERCIAL

## 2018-07-19 ENCOUNTER — TELEPHONE (OUTPATIENT)
Dept: INTERNAL MEDICINE CLINIC | Facility: CLINIC | Age: 28
End: 2018-07-19

## 2018-07-19 DIAGNOSIS — E53.8 LOW VITAMIN B12 LEVEL: Primary | ICD-10-CM

## 2018-07-19 PROCEDURE — 96372 THER/PROPH/DIAG INJ SC/IM: CPT | Performed by: INTERNAL MEDICINE

## 2018-07-19 RX ORDER — CYANOCOBALAMIN 1000 UG/ML
1000 INJECTION INTRAMUSCULAR; SUBCUTANEOUS ONCE
Status: COMPLETED | OUTPATIENT
Start: 2018-07-19 | End: 2018-07-19

## 2018-07-19 RX ADMIN — CYANOCOBALAMIN 1000 MCG: 1000 INJECTION INTRAMUSCULAR; SUBCUTANEOUS at 09:05:00

## 2018-07-19 NOTE — TELEPHONE ENCOUNTER
Notes recorded by Stephanie Hawthorne MD on 5/19/2018 at 9:30 AM CDT  b12 is on low side - recommend starting b12 injections once a month for 6 month. This could be contributing to tingling symptoms. This is pt's 3rd out of 6 B12.  Injection ordered per provi

## 2018-07-26 ENCOUNTER — PATIENT MESSAGE (OUTPATIENT)
Dept: NEUROLOGY | Facility: CLINIC | Age: 28
End: 2018-07-26

## 2018-08-09 ENCOUNTER — HOSPITAL ENCOUNTER (OUTPATIENT)
Age: 28
Discharge: HOME | End: 2018-08-09
Payer: COMMERCIAL

## 2018-08-09 VITALS
RESPIRATION RATE: 18 BRPM | BODY MASS INDEX: 23.04 KG/M2 | HEIGHT: 63 IN | OXYGEN SATURATION: 100 % | DIASTOLIC BLOOD PRESSURE: 80 MMHG | SYSTOLIC BLOOD PRESSURE: 139 MMHG | TEMPERATURE: 98 F | WEIGHT: 130 LBS | HEART RATE: 93 BPM

## 2018-08-09 DIAGNOSIS — J06.9 VIRAL URI: ICD-10-CM

## 2018-08-09 DIAGNOSIS — H69.83 DYSFUNCTION OF BOTH EUSTACHIAN TUBES: Primary | ICD-10-CM

## 2018-08-09 LAB — POCT RAPID STREP: NEGATIVE

## 2018-08-09 PROCEDURE — 87081 CULTURE SCREEN ONLY: CPT | Performed by: NURSE PRACTITIONER

## 2018-08-09 PROCEDURE — 99214 OFFICE O/P EST MOD 30 MIN: CPT

## 2018-08-09 PROCEDURE — 87430 STREP A AG IA: CPT | Performed by: NURSE PRACTITIONER

## 2018-08-09 RX ORDER — PREDNISONE 20 MG/1
20 TABLET ORAL 2 TIMES DAILY
Qty: 10 TABLET | Refills: 0 | Status: SHIPPED | OUTPATIENT
Start: 2018-08-09 | End: 2018-08-14

## 2018-08-10 NOTE — ED PROVIDER NOTES
Patient presents with:  Sinus Problem    HPI:     Lacey Blevins is a 25year old female who presents for evaluation of a chief complaint of sore throat, swollen glands, myalgias, headahce, fever, chills, pain while swallowing.   Onset of symptoms noted 2 auscultation bilaterally.  No wheezing, rhonchi or crackles   Heart: S1, S2 normal, no murmur, click, rub or gallop, regular rate and rhythm  Abdomen: soft, non-tender; bowel sounds normal; no masses,  no organomegaly  Skin: Skin color, texture, turgor norm

## 2018-08-21 ENCOUNTER — NURSE ONLY (OUTPATIENT)
Dept: INTERNAL MEDICINE CLINIC | Facility: CLINIC | Age: 28
End: 2018-08-21

## 2018-08-21 DIAGNOSIS — E53.8 LOW VITAMIN B12 LEVEL: Primary | ICD-10-CM

## 2018-08-21 PROCEDURE — 96372 THER/PROPH/DIAG INJ SC/IM: CPT | Performed by: INTERNAL MEDICINE

## 2018-08-21 RX ORDER — CYANOCOBALAMIN 1000 UG/ML
1000 INJECTION INTRAMUSCULAR; SUBCUTANEOUS ONCE
Status: COMPLETED | OUTPATIENT
Start: 2018-08-21 | End: 2018-08-21

## 2018-08-21 RX ADMIN — CYANOCOBALAMIN 1000 MCG: 1000 INJECTION INTRAMUSCULAR; SUBCUTANEOUS at 09:09:00

## 2018-09-20 ENCOUNTER — LAB ENCOUNTER (OUTPATIENT)
Dept: LAB | Age: 28
End: 2018-09-20
Attending: Other
Payer: COMMERCIAL

## 2018-09-20 ENCOUNTER — TELEPHONE (OUTPATIENT)
Dept: INTERNAL MEDICINE CLINIC | Facility: CLINIC | Age: 28
End: 2018-09-20

## 2018-09-20 ENCOUNTER — NURSE ONLY (OUTPATIENT)
Dept: INTERNAL MEDICINE CLINIC | Facility: CLINIC | Age: 28
End: 2018-09-20
Payer: COMMERCIAL

## 2018-09-20 DIAGNOSIS — D72.819 LEUKOPENIA, UNSPECIFIED TYPE: ICD-10-CM

## 2018-09-20 DIAGNOSIS — E53.8 LOW VITAMIN B12 LEVEL: Primary | ICD-10-CM

## 2018-09-20 DIAGNOSIS — E53.8 LOW VITAMIN B12 LEVEL: ICD-10-CM

## 2018-09-20 LAB
BASOPHILS # BLD AUTO: 0.05 X10(3) UL (ref 0–0.1)
BASOPHILS NFR BLD AUTO: 1.4 %
EOSINOPHIL # BLD AUTO: 0.14 X10(3) UL (ref 0–0.3)
EOSINOPHIL NFR BLD AUTO: 3.8 %
ERYTHROCYTE [DISTWIDTH] IN BLOOD BY AUTOMATED COUNT: 12.1 % (ref 11.5–16)
HAV AB SERPL IA-ACNC: >2000 PG/ML (ref 193–986)
HCT VFR BLD AUTO: 36.9 % (ref 34–50)
HGB BLD-MCNC: 12.4 G/DL (ref 12–16)
IMMATURE GRANULOCYTE COUNT: 0 X10(3) UL (ref 0–1)
IMMATURE GRANULOCYTE RATIO %: 0 %
LYMPHOCYTES # BLD AUTO: 1.04 X10(3) UL (ref 0.9–4)
LYMPHOCYTES NFR BLD AUTO: 28.1 %
MCH RBC QN AUTO: 31.1 PG (ref 27–33.2)
MCHC RBC AUTO-ENTMCNC: 33.6 G/DL (ref 31–37)
MCV RBC AUTO: 92.5 FL (ref 81–100)
MONOCYTES # BLD AUTO: 0.31 X10(3) UL (ref 0.1–1)
MONOCYTES NFR BLD AUTO: 8.4 %
NEUTROPHIL ABS PRELIM: 2.16 X10 (3) UL (ref 1.3–6.7)
NEUTROPHILS # BLD AUTO: 2.16 X10(3) UL (ref 1.3–6.7)
NEUTROPHILS NFR BLD AUTO: 58.3 %
PLATELET # BLD AUTO: 189 10(3)UL (ref 150–450)
RBC # BLD AUTO: 3.99 X10(6)UL (ref 3.8–5.1)
RED CELL DISTRIBUTION WIDTH-SD: 41.1 FL (ref 35.1–46.3)
WBC # BLD AUTO: 3.7 X10(3) UL (ref 4–13)

## 2018-09-20 PROCEDURE — 85025 COMPLETE CBC W/AUTO DIFF WBC: CPT | Performed by: INTERNAL MEDICINE

## 2018-09-20 PROCEDURE — 96372 THER/PROPH/DIAG INJ SC/IM: CPT | Performed by: INTERNAL MEDICINE

## 2018-09-20 PROCEDURE — 82607 VITAMIN B-12: CPT | Performed by: OTHER

## 2018-09-20 PROCEDURE — 36415 COLL VENOUS BLD VENIPUNCTURE: CPT | Performed by: OTHER

## 2018-09-20 RX ORDER — CYANOCOBALAMIN 1000 UG/ML
1000 INJECTION INTRAMUSCULAR; SUBCUTANEOUS
Status: DISCONTINUED | OUTPATIENT
Start: 2018-09-20 | End: 2018-10-25 | Stop reason: ALTCHOICE

## 2018-09-20 RX ADMIN — CYANOCOBALAMIN 1000 MCG: 1000 INJECTION INTRAMUSCULAR; SUBCUTANEOUS at 09:15:00

## 2018-09-20 NOTE — TELEPHONE ENCOUNTER
Order in lab result. Pt received 5th out of 6 B12 today (9/20/18). Notes recorded by Julita Maurice MD on 5/19/2018 at 9:30 AM CDT  b12 is on low side - recommend starting b12 injections once a month for 6 month.

## 2018-09-27 ENCOUNTER — OFFICE VISIT (OUTPATIENT)
Dept: NEUROLOGY | Facility: CLINIC | Age: 28
End: 2018-09-27
Payer: COMMERCIAL

## 2018-09-27 VITALS
OXYGEN SATURATION: 97 % | WEIGHT: 138 LBS | TEMPERATURE: 98 F | RESPIRATION RATE: 18 BRPM | DIASTOLIC BLOOD PRESSURE: 64 MMHG | SYSTOLIC BLOOD PRESSURE: 112 MMHG | HEART RATE: 100 BPM | BODY MASS INDEX: 24 KG/M2

## 2018-09-27 DIAGNOSIS — E53.8 LOW VITAMIN B12 LEVEL: ICD-10-CM

## 2018-09-27 DIAGNOSIS — D72.819 LEUKOPENIA, UNSPECIFIED TYPE: ICD-10-CM

## 2018-09-27 DIAGNOSIS — R90.82 WHITE MATTER ABNORMALITY ON MRI OF BRAIN: Primary | ICD-10-CM

## 2018-09-27 PROCEDURE — 99213 OFFICE O/P EST LOW 20 MIN: CPT | Performed by: OTHER

## 2018-09-27 NOTE — PROGRESS NOTES
HPI:    Patient ID: Harleen Albarran is a 25year old female.   Referring provider: Dr Tommy Luther is a 32year old female who presented for evaluation of white matter lesion that was found on recent MRI IAC obtained for dizziness and vertigo Grandmother    • Diabetes Paternal Grandfather    • Heart Disorder Paternal Grandfather    • Cancer Paternal Aunt         breast   • Diabetes Paternal Aunt    • Diabetes Paternal Uncle    • Heart Disorder Paternal Uncle    • Cancer Paternal Aunt       Soci normal.   Skin: Skin is warm and dry. Psychiatric:  normal mood and affect. Neurological   Awake, alert and oriented to time, place and person. Speech is fluent with intact comprehension, repetition and naming. Normal attention and memory.  Higher c with this encounter. The patient indicates understanding of these issues and agrees with the plan. Saul Johnson MD  [facility]        No orders of the defined types were placed in this encounter.       Meds This Visit:  Request

## 2018-10-08 ENCOUNTER — APPOINTMENT (OUTPATIENT)
Dept: HEMATOLOGY/ONCOLOGY | Facility: HOSPITAL | Age: 28
End: 2018-10-08
Attending: SPECIALIST
Payer: COMMERCIAL

## 2018-10-15 ENCOUNTER — OFFICE VISIT (OUTPATIENT)
Dept: HEMATOLOGY/ONCOLOGY | Facility: HOSPITAL | Age: 28
End: 2018-10-15
Attending: SPECIALIST
Payer: COMMERCIAL

## 2018-10-15 VITALS
DIASTOLIC BLOOD PRESSURE: 59 MMHG | OXYGEN SATURATION: 99 % | BODY MASS INDEX: 24.63 KG/M2 | SYSTOLIC BLOOD PRESSURE: 114 MMHG | TEMPERATURE: 98 F | HEART RATE: 79 BPM | RESPIRATION RATE: 16 BRPM | WEIGHT: 139 LBS | HEIGHT: 62.99 IN

## 2018-10-15 DIAGNOSIS — D70.9 NEUTROPENIA, UNSPECIFIED TYPE (HCC): Primary | ICD-10-CM

## 2018-10-15 DIAGNOSIS — E53.8 B12 DEFICIENCY: ICD-10-CM

## 2018-10-15 PROCEDURE — 99215 OFFICE O/P EST HI 40 MIN: CPT | Performed by: SPECIALIST

## 2018-10-15 RX ORDER — DROSPIRENONE AND ETHINYL ESTRADIOL 0.03MG-3MG
1 KIT ORAL DAILY
COMMUNITY
End: 2019-04-05

## 2018-10-15 NOTE — PROGRESS NOTES
[facility] [place] Report of Consultation      Patient Name: Aida Whitmore   YOB: 1990  Medical Record Number: [id number]  Attending Physician: Hugo Bianchi. Gomez Sotomayor M.D. Date of Consultation: 10/15/2018      Chief Complaint  Leukopenia. recommended genetic testing on Ms. Shankar’s mother. Testing was performed  on 11/17/16 at [facility].   Ms. Odell Gunn mother was found to have three variants of uncertain significance (VUS), JOSESITO c. 7919C>T (p.[id number]), BRIP1 c. 1255C>T (p.Vgv843Odu), and SD palpitations, edema, orthopnea. Gastrointestinal   No nausea, vomiting, diarrhea, constipation, melena, hematochezia. Genitourinary      No hematuria, dysuria, increased frequency, urgency, hesitancy, incontinence, vaginal bleeding.   Musculoskeletal   No 10:27 AM   Result Value Ref Range    WBC 3.4 (L) 4.0 - 13.0 x10(3) uL    RBC 3.80 3.80 - 5.10 x10(6)uL    HGB 12.0 12.0 - 16.0 g/dL    HCT 35.3 34.0 - 50.0 %    .0 150.0 - 450.0 10(3)uL    MCV 92.9 81.0 - 100.0 fL    MCH 31.6 27.0 - 33.2 pg    MCHC Result Value Ref Range    Vitamin B12 386 193 - 986 pg/mL   COPPER, SERUM    Collection Time: 10/15/18 12:54 PM   Result Value Ref Range    Copper, Serum 186 (H) 80 - 155 ug/dL   METHYLMALONIC ACID, SERUM    Collection Time: 10/15/18 12:54 PM   Result Va etiologies. Records from outside hospital from 2014 were reviewed. Impression and Plan   1. Neutropenia: Etiology is not immediately apparent. There is an apparent change in white blood cell count and neutrophil percentage from before.  I recomme

## 2018-10-15 NOTE — PROGRESS NOTES
Patient is here today for Hematology Consult with Karon way. Patient denies pain. Medication list and medical history were reviewed and updated.      Education Record    Learner:  Patient    Disease / Diagnosis: Hematology Consult    Barriers / Yoko Lucialling

## 2018-10-18 ENCOUNTER — TELEPHONE (OUTPATIENT)
Dept: INTERNAL MEDICINE CLINIC | Facility: CLINIC | Age: 28
End: 2018-10-18

## 2018-10-18 NOTE — TELEPHONE ENCOUNTER
Notes recorded by Bre Schwartz MD on 5/19/2018 at 9:30 AM CDT  b12 is on low side - recommend starting b12 injections once a month for 6 month. This is the 6th out of 6 B12. Pt should have B12 rechecked 1 month after 6th B12.

## 2018-10-22 ENCOUNTER — TELEPHONE (OUTPATIENT)
Dept: INTERNAL MEDICINE CLINIC | Facility: CLINIC | Age: 28
End: 2018-10-22

## 2018-10-22 DIAGNOSIS — E53.8 VITAMIN B12 DEFICIENCY: Primary | ICD-10-CM

## 2018-10-23 ENCOUNTER — NURSE ONLY (OUTPATIENT)
Dept: INTERNAL MEDICINE CLINIC | Facility: CLINIC | Age: 28
End: 2018-10-23
Payer: COMMERCIAL

## 2018-10-23 DIAGNOSIS — Z23 NEED FOR VACCINATION: ICD-10-CM

## 2018-10-23 DIAGNOSIS — E53.8 VITAMIN B12 DEFICIENCY: Primary | ICD-10-CM

## 2018-10-23 PROCEDURE — 96372 THER/PROPH/DIAG INJ SC/IM: CPT | Performed by: INTERNAL MEDICINE

## 2018-10-23 PROCEDURE — 90471 IMMUNIZATION ADMIN: CPT | Performed by: INTERNAL MEDICINE

## 2018-10-23 PROCEDURE — 90686 IIV4 VACC NO PRSV 0.5 ML IM: CPT | Performed by: INTERNAL MEDICINE

## 2018-10-23 RX ADMIN — CYANOCOBALAMIN 1000 MCG: 1000 INJECTION INTRAMUSCULAR; SUBCUTANEOUS at 09:21:00

## 2018-10-23 NOTE — TELEPHONE ENCOUNTER
Spoke with Dr. Kim West for clarification. She states pt will most likely need B12 indefinitely. She agrees for pt to have B12 checked in 1 month and will address further B12 injections once we have the results.      Pt notified at Nurse visit to have B12 ervin

## 2018-10-25 ENCOUNTER — OFFICE VISIT (OUTPATIENT)
Dept: HEMATOLOGY/ONCOLOGY | Facility: HOSPITAL | Age: 28
End: 2018-10-25
Attending: SPECIALIST
Payer: COMMERCIAL

## 2018-10-25 VITALS
SYSTOLIC BLOOD PRESSURE: 118 MMHG | TEMPERATURE: 98 F | HEIGHT: 62.99 IN | WEIGHT: 138 LBS | RESPIRATION RATE: 16 BRPM | BODY MASS INDEX: 24.45 KG/M2 | DIASTOLIC BLOOD PRESSURE: 69 MMHG | OXYGEN SATURATION: 96 % | HEART RATE: 78 BPM

## 2018-10-25 DIAGNOSIS — D70.9 NEUTROPENIA, UNSPECIFIED TYPE (HCC): Primary | ICD-10-CM

## 2018-10-25 DIAGNOSIS — D64.9 NORMOCYTIC NORMOCHROMIC ANEMIA: ICD-10-CM

## 2018-10-25 PROCEDURE — 99214 OFFICE O/P EST MOD 30 MIN: CPT | Performed by: SPECIALIST

## 2018-10-25 NOTE — PROGRESS NOTES
Patient is here today for follow up  with Valery Florian for possible bone marrow biopsy and aspiration. Patient denies pain. Medication list and medical history were reviewed and updated.      Education Record    Learner:  Patient    Disease / Diagnosis: logan

## 2018-10-25 NOTE — PROGRESS NOTES
[facility] Report of Consultation      Patient Name: Darshana Luis   YOB: 1990  Medical Record Number: [id number]  Attending Physician: Susi Barone. Bry Marquez M.D.      Date of Consultation: 10/25/2018      Chief Complaint  Leukopenia - obtained. Ms. Carolee Mark mother was recently diagnosed with an ER-positive breast cancer at age 59. Ms. Carolee Mark mother’s surgeon recommended genetic testing on Ms. Shankar’s mother. Testing was performed  on 11/17/16 at [facility].   Ms. Carolee Mark mother was f kg/m²     Physical Examination  Constitutional      Well developed, well nourished. Appears close to chronological age. No apparent distress. Head Normocephalic and atraumatic. Eyes Conjunctiva clear; sclera anicteric.   ENMT                 External nos B Surface Antigen Nonreactive  Nonreactive        Impression and Plan   1. Neutropenia: Today's laboratory studies show a normal white blood cell count and differential. Cancelled bone marrow biopsy today.  Patient will have laboratory studies weekly for

## 2018-11-01 ENCOUNTER — NURSE ONLY (OUTPATIENT)
Dept: HEMATOLOGY/ONCOLOGY | Facility: HOSPITAL | Age: 28
End: 2018-11-01
Attending: SPECIALIST
Payer: COMMERCIAL

## 2018-11-01 DIAGNOSIS — D70.9 NEUTROPENIA, UNSPECIFIED TYPE (HCC): ICD-10-CM

## 2018-11-01 PROCEDURE — 82728 ASSAY OF FERRITIN: CPT

## 2018-11-01 PROCEDURE — 36415 COLL VENOUS BLD VENIPUNCTURE: CPT

## 2018-11-01 PROCEDURE — 83540 ASSAY OF IRON: CPT

## 2018-11-01 PROCEDURE — 85025 COMPLETE CBC W/AUTO DIFF WBC: CPT

## 2018-11-01 PROCEDURE — 83550 IRON BINDING TEST: CPT

## 2018-11-08 ENCOUNTER — NURSE ONLY (OUTPATIENT)
Dept: HEMATOLOGY/ONCOLOGY | Facility: HOSPITAL | Age: 28
End: 2018-11-08
Attending: SPECIALIST
Payer: COMMERCIAL

## 2018-11-08 PROCEDURE — 36415 COLL VENOUS BLD VENIPUNCTURE: CPT

## 2018-11-15 ENCOUNTER — NURSE ONLY (OUTPATIENT)
Dept: HEMATOLOGY/ONCOLOGY | Facility: HOSPITAL | Age: 28
End: 2018-11-15
Attending: SPECIALIST
Payer: COMMERCIAL

## 2018-11-15 ENCOUNTER — TELEPHONE (OUTPATIENT)
Dept: INTERNAL MEDICINE CLINIC | Facility: CLINIC | Age: 28
End: 2018-11-15

## 2018-11-15 DIAGNOSIS — E53.8 VITAMIN B12 DEFICIENCY: ICD-10-CM

## 2018-11-15 PROCEDURE — 36415 COLL VENOUS BLD VENIPUNCTURE: CPT

## 2018-11-15 PROCEDURE — 82607 VITAMIN B-12: CPT | Performed by: INTERNAL MEDICINE

## 2018-11-15 NOTE — TELEPHONE ENCOUNTER
Left message for patient to call office back. Pt has not had Vit D checked. Pt has had Vit B12 checked and has been getting monthly B12 injections. Pt had Vit B12 checked yesterday at 9:06 am and there is a result note from Dr. Manuela Mcmullen.  Pt need to have Vit

## 2018-11-15 NOTE — TELEPHONE ENCOUNTER
Patient calling in requesting for her Vitamin D order to be placed for her 6 month recheck. Please call pt with order status.

## 2018-11-16 NOTE — TELEPHONE ENCOUNTER
Called pt and notified her of test results and provider instructions. Pt stated that she was talking about a Vitamin D but believes it was a misunderstanding because the tech last week told her she had a Vitamin D from Dr. Danielle Thomas to be checked.  She stated

## 2018-11-20 ENCOUNTER — NURSE ONLY (OUTPATIENT)
Dept: HEMATOLOGY/ONCOLOGY | Facility: HOSPITAL | Age: 28
End: 2018-11-20
Attending: SPECIALIST
Payer: COMMERCIAL

## 2018-11-20 DIAGNOSIS — D70.9 NEUTROPENIA, UNSPECIFIED TYPE (HCC): ICD-10-CM

## 2018-11-20 PROCEDURE — 36415 COLL VENOUS BLD VENIPUNCTURE: CPT

## 2018-11-20 PROCEDURE — 85025 COMPLETE CBC W/AUTO DIFF WBC: CPT

## 2018-11-23 ENCOUNTER — IMAGING SERVICES (OUTPATIENT)
Dept: OTHER | Age: 28
End: 2018-11-23

## 2018-11-23 ENCOUNTER — NURSE ONLY (OUTPATIENT)
Dept: HEMATOLOGY/ONCOLOGY | Facility: HOSPITAL | Age: 28
End: 2018-11-23
Attending: SPECIALIST
Payer: COMMERCIAL

## 2018-11-23 PROCEDURE — 36415 COLL VENOUS BLD VENIPUNCTURE: CPT

## 2018-11-29 ENCOUNTER — APPOINTMENT (OUTPATIENT)
Dept: HEMATOLOGY/ONCOLOGY | Facility: HOSPITAL | Age: 28
End: 2018-11-29
Attending: SPECIALIST
Payer: COMMERCIAL

## 2019-02-17 NOTE — PROGRESS NOTES
[facility] Report of Consultation      Patient Name: oLla Vo   YOB: 1990  Medical Record Number: [id number]  Attending Physician: Rand Mathias. Jori Kim M.D.      Date of Consultation: 10/25/2018      Chief Complaint  Leukopenia - syndrome in neck. Past Surgical History (historical data, reviewed)  Tonsillectomy; myringotomy. Gynecologic History (historical data, reviewed)  Menarche age 13 years; ; first full term pregnancy age 27 years; OCP use from age [age].      Family Allergies   Ms. Shankar is allergic to antihistamine decongestant [ed a-hist pse]. Review of Systems  Constitutional      No fevers, chills, night sweats, excessive fatigue. Hematologic/Lymphatic No tender or enlarged lymph nodes.   Respiratory x10(3) uL    Monocyte Absolute 0.39 0.10 - 1.00 x10(3) uL    Eosinophil Absolute 0.20 0.00 - 0.70 x10(3) uL    Basophil Absolute 0.06 0.00 - 0.20 x10(3) uL    Immature Granulocyte Absolute 0.01 0.00 - 1.00 x10(3) uL    Neutrophil % 53.8 %    Lymphocyte % 3

## 2019-02-21 ENCOUNTER — OFFICE VISIT (OUTPATIENT)
Dept: HEMATOLOGY/ONCOLOGY | Facility: HOSPITAL | Age: 29
End: 2019-02-21
Attending: SPECIALIST
Payer: COMMERCIAL

## 2019-02-21 VITALS
TEMPERATURE: 99 F | HEIGHT: 62.99 IN | SYSTOLIC BLOOD PRESSURE: 125 MMHG | BODY MASS INDEX: 24.19 KG/M2 | HEART RATE: 97 BPM | OXYGEN SATURATION: 99 % | DIASTOLIC BLOOD PRESSURE: 78 MMHG | RESPIRATION RATE: 18 BRPM | WEIGHT: 136.5 LBS

## 2019-02-21 DIAGNOSIS — D70.4 CYCLICAL NEUTROPENIA (HCC): Primary | ICD-10-CM

## 2019-02-21 PROCEDURE — 99213 OFFICE O/P EST LOW 20 MIN: CPT | Performed by: SPECIALIST

## 2019-02-21 RX ORDER — MULTIVIT WITH MINERALS/LUTEIN
1000 TABLET ORAL DAILY
COMMUNITY
End: 2020-07-06

## 2019-02-21 RX ORDER — HYDROCORTISONE ACETATE 0.5 %
1 CREAM (GRAM) TOPICAL DAILY
COMMUNITY
End: 2019-08-02

## 2019-02-21 RX ORDER — MULTIVITAMIN/IRON/FOLIC ACID 18MG-0.4MG
1 TABLET ORAL DAILY
COMMUNITY
End: 2019-08-02

## 2019-02-21 NOTE — PROGRESS NOTES
Patient is here today for follow up  with Frank Spencer for Cyclical Neutropenia . Patient stated intermittent abdominal cramping. Medication list and medical history were reviewed and updated.      Education Record    Learner:  Patient    Disease / Diagnosis

## 2019-03-16 ENCOUNTER — HOSPITAL ENCOUNTER (OUTPATIENT)
Age: 29
Discharge: HOME | End: 2019-03-16
Payer: COMMERCIAL

## 2019-03-16 VITALS
DIASTOLIC BLOOD PRESSURE: 71 MMHG | RESPIRATION RATE: 17 BRPM | TEMPERATURE: 98 F | OXYGEN SATURATION: 100 % | HEART RATE: 69 BPM | SYSTOLIC BLOOD PRESSURE: 119 MMHG

## 2019-03-16 DIAGNOSIS — J01.20 ACUTE NON-RECURRENT ETHMOIDAL SINUSITIS: Primary | ICD-10-CM

## 2019-03-16 PROCEDURE — 99214 OFFICE O/P EST MOD 30 MIN: CPT

## 2019-03-16 PROCEDURE — 99213 OFFICE O/P EST LOW 20 MIN: CPT

## 2019-03-16 RX ORDER — AMOXICILLIN AND CLAVULANATE POTASSIUM 875; 125 MG/1; MG/1
1 TABLET, FILM COATED ORAL 2 TIMES DAILY
Qty: 20 TABLET | Refills: 0 | Status: SHIPPED | OUTPATIENT
Start: 2019-03-16 | End: 2019-03-26

## 2019-03-16 RX ORDER — FLUTICASONE PROPIONATE 50 MCG
2 SPRAY, SUSPENSION (ML) NASAL DAILY
Qty: 16 G | Refills: 0 | Status: SHIPPED | OUTPATIENT
Start: 2019-03-16 | End: 2019-04-15

## 2019-03-16 NOTE — ED INITIAL ASSESSMENT (HPI)
Pt here c/o congestion, left facial pain. Onset of symptoms started on Monday. Sneezing, denies fever.

## 2019-03-29 ENCOUNTER — APPOINTMENT (OUTPATIENT)
Dept: LAB | Age: 29
End: 2019-03-29
Attending: INTERNAL MEDICINE
Payer: COMMERCIAL

## 2019-03-29 DIAGNOSIS — E53.8 VITAMIN B12 DEFICIENCY: ICD-10-CM

## 2019-03-29 LAB — VIT B12 SERPL-MCNC: 347 PG/ML (ref 193–986)

## 2019-03-29 PROCEDURE — 36415 COLL VENOUS BLD VENIPUNCTURE: CPT | Performed by: INTERNAL MEDICINE

## 2019-03-29 PROCEDURE — 82607 VITAMIN B-12: CPT | Performed by: INTERNAL MEDICINE

## 2019-04-02 ENCOUNTER — TELEPHONE (OUTPATIENT)
Dept: NEUROLOGY | Facility: CLINIC | Age: 29
End: 2019-04-02

## 2019-04-02 DIAGNOSIS — R93.89 ABNORMAL MRI: Primary | ICD-10-CM

## 2019-04-02 NOTE — TELEPHONE ENCOUNTER
Patient states that at last office visit Dr discussed a repeat MRI around May 2019.  An order for the MRI needs to be put in

## 2019-04-02 NOTE — TELEPHONE ENCOUNTER
Patient calling for orders for MRI Brain per LOV notes:    R93.0) White matter abnormality on MRI of brain  (primary encounter diagnosis)   (E53.8) Low vitamin B12 level   (D72.819) Leukopenia, unspecified type  Plan: OP REFERRAL TO [facility]

## 2019-04-04 NOTE — TELEPHONE ENCOUNTER
Spoke with patient to inform her of MRI brain order and authorization. Pt verbalized understanding, will contact CS to schedule, and c/b office to schedule f/u after test is scheduled. No further questions.

## 2019-04-05 DIAGNOSIS — Z30.41 ENCOUNTER FOR SURVEILLANCE OF CONTRACEPTIVE PILLS: ICD-10-CM

## 2019-04-05 RX ORDER — DROSPIRENONE AND ETHINYL ESTRADIOL 0.03MG-3MG
KIT ORAL
Qty: 84 TABLET | Refills: 0 | Status: SHIPPED | OUTPATIENT
Start: 2019-04-05 | End: 2019-06-24

## 2019-04-05 NOTE — TELEPHONE ENCOUNTER
Last OV: 4/24/18 with Dr. Pop Savage for annual  Last refill date: 4/24/18  Follow-up: 1 year  Next appt. : 4/26/19    Refill sent.

## 2019-05-02 ENCOUNTER — TELEPHONE (OUTPATIENT)
Dept: NEUROLOGY | Facility: CLINIC | Age: 29
End: 2019-05-02

## 2019-05-02 NOTE — TELEPHONE ENCOUNTER
Patient has MRI scheduled for 5/3/19. Patient has a cold and would like to know if it is ok for her to take cold medicine before the MRI. Ok to leave voicemail, patient is at work.

## 2019-05-02 NOTE — TELEPHONE ENCOUNTER
Left message on patient identified voicemail (ok with HIPPA consent) informing patient taking cold medicine prior to her MRI should be okay.

## 2019-05-03 ENCOUNTER — HOSPITAL ENCOUNTER (OUTPATIENT)
Dept: MRI IMAGING | Facility: HOSPITAL | Age: 29
Discharge: HOME | End: 2019-05-03
Attending: Other
Payer: COMMERCIAL

## 2019-05-03 ENCOUNTER — TELEPHONE (OUTPATIENT)
Dept: NEUROLOGY | Facility: CLINIC | Age: 29
End: 2019-05-03

## 2019-05-03 DIAGNOSIS — R93.89 ABNORMAL MRI: ICD-10-CM

## 2019-05-03 PROCEDURE — 70553 MRI BRAIN STEM W/O & W/DYE: CPT | Performed by: OTHER

## 2019-05-03 PROCEDURE — A9575 INJ GADOTERATE MEGLUMI 0.1ML: HCPCS | Performed by: OTHER

## 2019-05-03 NOTE — TELEPHONE ENCOUNTER
Relayed test results to pt.  PT expressed understanding and encouraged to call office with any questions.    ----- Message from Yashira Pablo MD sent at 5/3/2019  2:49 PM CDT -----  No acute abnormality seen

## 2019-05-07 ENCOUNTER — TELEPHONE (OUTPATIENT)
Dept: NEUROLOGY | Facility: CLINIC | Age: 29
End: 2019-05-07

## 2019-05-09 ENCOUNTER — OFFICE VISIT (OUTPATIENT)
Dept: NEUROLOGY | Facility: CLINIC | Age: 29
End: 2019-05-09
Payer: COMMERCIAL

## 2019-05-09 VITALS
HEART RATE: 99 BPM | WEIGHT: 136.25 LBS | DIASTOLIC BLOOD PRESSURE: 64 MMHG | OXYGEN SATURATION: 98 % | RESPIRATION RATE: 16 BRPM | TEMPERATURE: 98 F | HEIGHT: 62.99 IN | BODY MASS INDEX: 24.14 KG/M2 | SYSTOLIC BLOOD PRESSURE: 100 MMHG

## 2019-05-09 DIAGNOSIS — R90.82 WHITE MATTER ABNORMALITY ON MRI OF BRAIN: Primary | ICD-10-CM

## 2019-05-09 DIAGNOSIS — E53.8 LOW VITAMIN B12 LEVEL: ICD-10-CM

## 2019-05-09 DIAGNOSIS — R20.2 PARESTHESIA: ICD-10-CM

## 2019-05-09 PROCEDURE — 99213 OFFICE O/P EST LOW 20 MIN: CPT | Performed by: OTHER

## 2019-05-09 RX ORDER — CHOLECALCIFEROL (VITAMIN D3) 125 MCG
1000 CAPSULE ORAL DAILY
COMMUNITY
End: 2020-01-28 | Stop reason: DRUGHIGH

## 2019-05-09 NOTE — PROGRESS NOTES
HPI:    Patient ID: Blaze Hernandez is a 25year old female. Referring provider: Dr Eugenio Narayanan    HPI    Patient presented for follow up.  States she continues to have symptoms- intermittent tingling and numbness at different locations, eye twitching, dizzy a • Ear Problems Father    • Heart Disorder Father    • Hypertension Mother    • Cancer Mother 59        breast cancer   • Clotting Disorder Mother    • Heart Disorder Maternal Grandmother    • Diabetes Paternal Grandmother    • Heart Disorder Paternal Gra child   PHYSICAL EXAM:   Physical Exam      Blood pressure 100/64, pulse 99, temperature 98.3 °F (36.8 °C), temperature source Oral, resp. rate 16, height 62.99\", weight 136 lb 4 oz, SpO2 98 %, not currently breastfeeding.     Vitals reviewed  General:  He periventricular lesions, prominent in the right periventricular deep white matter likely incidental and unrelated to low B12 level. Repeat MRI brain was unremarkable.  Given her persistent symptoms and normal b12 level we will go ahead with MRI C spine to a

## 2019-05-16 ENCOUNTER — NURSE ONLY (OUTPATIENT)
Dept: HEMATOLOGY/ONCOLOGY | Facility: HOSPITAL | Age: 29
End: 2019-05-16
Attending: SPECIALIST
Payer: COMMERCIAL

## 2019-05-16 DIAGNOSIS — D70.4 CYCLICAL NEUTROPENIA (HCC): ICD-10-CM

## 2019-05-16 PROCEDURE — 85025 COMPLETE CBC W/AUTO DIFF WBC: CPT

## 2019-05-16 PROCEDURE — 36415 COLL VENOUS BLD VENIPUNCTURE: CPT

## 2019-05-31 ENCOUNTER — OFFICE VISIT (OUTPATIENT)
Dept: OBGYN CLINIC | Facility: CLINIC | Age: 29
End: 2019-05-31
Payer: COMMERCIAL

## 2019-05-31 VITALS
WEIGHT: 137 LBS | DIASTOLIC BLOOD PRESSURE: 60 MMHG | HEIGHT: 63 IN | SYSTOLIC BLOOD PRESSURE: 110 MMHG | BODY MASS INDEX: 24.27 KG/M2

## 2019-05-31 DIAGNOSIS — Z12.39 BREAST CANCER SCREENING: ICD-10-CM

## 2019-05-31 DIAGNOSIS — Z12.4 CERVICAL CANCER SCREENING: ICD-10-CM

## 2019-05-31 DIAGNOSIS — Z01.419 WELL WOMAN EXAM: Primary | ICD-10-CM

## 2019-05-31 PROCEDURE — 87624 HPV HI-RISK TYP POOLED RSLT: CPT | Performed by: OBSTETRICS & GYNECOLOGY

## 2019-05-31 PROCEDURE — 99395 PREV VISIT EST AGE 18-39: CPT | Performed by: OBSTETRICS & GYNECOLOGY

## 2019-05-31 NOTE — PROGRESS NOTES
GYN H&P     2019  2:31 PM    CC: Patient presents with:  Physical: annual, desires pap      HPI: patient is a 25year old  here for her annual gyne exam.      Pt doing well   Considering pregnancy  Desires pap smear today  Her mother had breast Take 1,000 mg by mouth daily. Disp:  Rfl:    Glucosamine-Chondroit-Vit C-Mn (GLUCOSAMINE CHONDR 1500 COMPLX) Oral Cap Take 1 capsule by mouth daily. Disp:  Rfl:    Cholecalciferol (VITAMIN D) 1000 units Oral Tab Take 1 capsule by mouth.    Disp:  Rfl: week: Not on file        Minutes per session: Not on file      Stress: Not on file    Relationships      Social connections:        Talks on phone: Not on file        Gets together: Not on file        Attends [employer] service: Not on file        Active me changes, no axillary adenopathy  ABDOMEN: Soft, non distended; non tender, no masses  GYNE/:                        External Genitalia: Normal appearing, no lesions.            Bladder: well supported, urethra wnl, no lesions                     Vagina: n

## 2019-06-07 ENCOUNTER — OFFICE VISIT (OUTPATIENT)
Dept: HEMATOLOGY/ONCOLOGY | Facility: HOSPITAL | Age: 29
End: 2019-06-07
Attending: SPECIALIST
Payer: COMMERCIAL

## 2019-06-07 VITALS
RESPIRATION RATE: 18 BRPM | HEART RATE: 69 BPM | WEIGHT: 137 LBS | SYSTOLIC BLOOD PRESSURE: 129 MMHG | BODY MASS INDEX: 24.27 KG/M2 | TEMPERATURE: 99 F | DIASTOLIC BLOOD PRESSURE: 76 MMHG | HEIGHT: 62.99 IN | OXYGEN SATURATION: 99 %

## 2019-06-07 DIAGNOSIS — D70.9 NEUTROPENIA, UNSPECIFIED TYPE (HCC): Primary | ICD-10-CM

## 2019-06-07 PROCEDURE — 38222 DX BONE MARROW BX & ASPIR: CPT | Performed by: SPECIALIST

## 2019-06-07 NOTE — PROGRESS NOTES
Patient is here today for follow up with Belinda Mckeon for Bone Marrow Biopsy and Aspiration. Patient denies pain. Medication list and medical history were reviewed and updated.      Education Record    Learner:  Patient     Disease / Diagnosis: Bone marrow bi

## 2019-06-08 NOTE — PROGRESS NOTES
[facility] Bone Marrow Biopsy Procedure Note      Patient Name: Rigoberto Jara   YOB: 1990  Medical Record Number: [id number]  Attending Physician: Narcisa Ruiz. Osmany Lares M.D.        Date of Procedure: 6/7/2019      Narrative  Patient pre

## 2019-06-14 ENCOUNTER — TELEPHONE (OUTPATIENT)
Dept: NEUROLOGY | Facility: CLINIC | Age: 29
End: 2019-06-14

## 2019-06-14 ENCOUNTER — HOSPITAL ENCOUNTER (OUTPATIENT)
Dept: MRI IMAGING | Facility: HOSPITAL | Age: 29
Discharge: HOME | End: 2019-06-14
Attending: Other
Payer: COMMERCIAL

## 2019-06-14 ENCOUNTER — PATIENT MESSAGE (OUTPATIENT)
Dept: NEUROLOGY | Facility: CLINIC | Age: 29
End: 2019-06-14

## 2019-06-14 DIAGNOSIS — R90.82 WHITE MATTER ABNORMALITY ON MRI OF BRAIN: ICD-10-CM

## 2019-06-14 PROCEDURE — 72157 MRI CHEST SPINE W/O & W/DYE: CPT | Performed by: OTHER

## 2019-06-14 PROCEDURE — 72156 MRI NECK SPINE W/O & W/DYE: CPT | Performed by: OTHER

## 2019-06-14 PROCEDURE — A9575 INJ GADOTERATE MEGLUMI 0.1ML: HCPCS | Performed by: OTHER

## 2019-06-14 NOTE — TELEPHONE ENCOUNTER
Per Epic review, patient is active on MyChart.  Message sent with the below information    ----- Message from Ray Meza MD sent at 6/14/2019 11:59 AM CDT -----  Unremarkable

## 2019-06-17 NOTE — PROGRESS NOTES
Sent patient Gabstrhart message as well as called patient to discuss MRI report. Patient states she will call [facility] if she believes she needs to be seen sooner. Patient denies any further needs at this time.  Encouraged patient to call office for any questions o

## 2019-06-17 NOTE — TELEPHONE ENCOUNTER
From: Desirae Swift  To: Chris Jj MD  Sent: 6/14/2019 9:07 PM CDT  Subject: Test Results Question    Hello,  I received the message from [facility] and have reviewed the MRI of the spine results.  I have some follow up questions regarding the disc

## 2019-06-18 ENCOUNTER — TELEPHONE (OUTPATIENT)
Dept: HEMATOLOGY/ONCOLOGY | Facility: HOSPITAL | Age: 29
End: 2019-06-18

## 2019-06-18 NOTE — TELEPHONE ENCOUNTER
Patient returned call - notified how to obtain pathology slides. Instructed it takes 48 hours to obtain the slides. Patient to call medical records to try to expedite process.

## 2019-06-24 DIAGNOSIS — Z30.41 ENCOUNTER FOR SURVEILLANCE OF CONTRACEPTIVE PILLS: ICD-10-CM

## 2019-06-24 RX ORDER — DROSPIRENONE AND ETHINYL ESTRADIOL 0.03MG-3MG
KIT ORAL
Qty: 84 TABLET | Refills: 3 | Status: SHIPPED | OUTPATIENT
Start: 2019-06-24 | End: 2019-08-02

## 2019-06-24 NOTE — TELEPHONE ENCOUNTER
Last OV: 5/31/19 with Dr. Gary Braxton  Last refill date: 4/5/19  Follow-up: 1 year/prn  Next appt.: none scheduled; due 5/2020      Spoke with patient. She has been taking medication and desires to continue.  She was under the impression that a 12 month supply w

## 2019-08-02 ENCOUNTER — OFFICE VISIT (OUTPATIENT)
Dept: INTERNAL MEDICINE CLINIC | Facility: CLINIC | Age: 29
End: 2019-08-02
Payer: COMMERCIAL

## 2019-08-02 ENCOUNTER — APPOINTMENT (OUTPATIENT)
Dept: LAB | Age: 29
End: 2019-08-02
Attending: INTERNAL MEDICINE
Payer: COMMERCIAL

## 2019-08-02 VITALS
DIASTOLIC BLOOD PRESSURE: 60 MMHG | SYSTOLIC BLOOD PRESSURE: 110 MMHG | HEART RATE: 62 BPM | BODY MASS INDEX: 24.27 KG/M2 | WEIGHT: 137 LBS | HEIGHT: 63 IN | RESPIRATION RATE: 16 BRPM | TEMPERATURE: 98 F | OXYGEN SATURATION: 99 %

## 2019-08-02 DIAGNOSIS — Z00.00 LABORATORY EXAM ORDERED AS PART OF ROUTINE GENERAL MEDICAL EXAMINATION: ICD-10-CM

## 2019-08-02 DIAGNOSIS — E55.9 HYPOVITAMINOSIS D: ICD-10-CM

## 2019-08-02 DIAGNOSIS — E53.8 LOW VITAMIN B12 LEVEL: ICD-10-CM

## 2019-08-02 DIAGNOSIS — Z00.00 ENCOUNTER FOR ROUTINE ADULT MEDICAL EXAMINATION: Primary | ICD-10-CM

## 2019-08-02 LAB
CHOLEST SMN-MCNC: 166 MG/DL (ref ?–200)
HDLC SERPL-MCNC: 76 MG/DL (ref 40–59)
LDLC SERPL CALC-MCNC: 83 MG/DL (ref ?–100)
NONHDLC SERPL-MCNC: 90 MG/DL (ref ?–130)
TRIGL SERPL-MCNC: 35 MG/DL (ref 30–149)
TSI SER-ACNC: 2.84 MIU/ML (ref 0.36–3.74)
VIT B12 SERPL-MCNC: 536 PG/ML (ref 193–986)
VIT D+METAB SERPL-MCNC: 42.2 NG/ML (ref 30–100)
VLDLC SERPL CALC-MCNC: 7 MG/DL (ref 0–30)

## 2019-08-02 PROCEDURE — 82607 VITAMIN B-12: CPT | Performed by: INTERNAL MEDICINE

## 2019-08-02 PROCEDURE — 80061 LIPID PANEL: CPT | Performed by: INTERNAL MEDICINE

## 2019-08-02 PROCEDURE — 84443 ASSAY THYROID STIM HORMONE: CPT | Performed by: INTERNAL MEDICINE

## 2019-08-02 PROCEDURE — 82306 VITAMIN D 25 HYDROXY: CPT | Performed by: INTERNAL MEDICINE

## 2019-08-02 PROCEDURE — 99395 PREV VISIT EST AGE 18-39: CPT | Performed by: INTERNAL MEDICINE

## 2019-08-02 PROCEDURE — 36415 COLL VENOUS BLD VENIPUNCTURE: CPT | Performed by: INTERNAL MEDICINE

## 2019-08-02 RX ORDER — CHOLECALCIFEROL (VITAMIN D3) 25 MCG
1 TABLET,CHEWABLE ORAL DAILY
COMMUNITY

## 2019-08-02 NOTE — PROGRESS NOTES
[facility] Internal Medicine Office Note  Chief Complaint:   Patient presents with:   Follow - Up: Vitamin B       HPI:   This is a 30year old female coming in for physical and follow up  HPI  Low vitamin D and B12 in past     She is following wi per week      Comment: 1-2 drinks weekly    Drug use: No    Allergies:    Antihistamine Decon*        Comment:Tachycardia, as a child    Current Outpatient Medications:  prenatal multivitamin plus DHA 27-0.8-228 MG Oral Cap Take 1 capsule by mouth daily.  D Skin: Skin is warm and dry. Psychiatric: She has a normal mood and affect.        ASSESSMENT AND PLAN:   Kayy Thompson is a 30year old female with  Encounter for routine adult medical examination  (primary encounter diagnosis)  Low vitamin B12 level

## 2019-10-07 ENCOUNTER — NURSE ONLY (OUTPATIENT)
Dept: HEMATOLOGY/ONCOLOGY | Facility: HOSPITAL | Age: 29
End: 2019-10-07
Attending: SPECIALIST
Payer: COMMERCIAL

## 2019-10-07 PROCEDURE — 36415 COLL VENOUS BLD VENIPUNCTURE: CPT

## 2019-10-17 ENCOUNTER — TELEPHONE (OUTPATIENT)
Dept: OBGYN CLINIC | Facility: CLINIC | Age: 29
End: 2019-10-17

## 2019-10-17 NOTE — TELEPHONE ENCOUNTER
Pt last seen 5/31/19 for annual physical with Dr. Riana Morejon. Pt states she stopped OCP 3 months ago; trying for conception. Pt states first menses started 42 days after stopping OCP. Now 45 days after lmp with no menses; negative UPT yesterday.   Pt advised

## 2019-10-17 NOTE — TELEPHONE ENCOUNTER
Patient wanted to know what your thoughts are on her not getting her menses now for 45 days. She already took a pregnancy test and it was negative. Please call to discuss.

## 2019-10-24 NOTE — TELEPHONE ENCOUNTER
Patient is now on day 46 and took a UPT this AM that is negative. She is wondering what her next steps should be. Please advise.

## 2019-10-25 ENCOUNTER — OFFICE VISIT (OUTPATIENT)
Dept: OBGYN CLINIC | Facility: CLINIC | Age: 29
End: 2019-10-25
Payer: COMMERCIAL

## 2019-10-25 VITALS
HEART RATE: 74 BPM | DIASTOLIC BLOOD PRESSURE: 60 MMHG | HEIGHT: 63 IN | SYSTOLIC BLOOD PRESSURE: 96 MMHG | BODY MASS INDEX: 24.03 KG/M2 | WEIGHT: 135.63 LBS

## 2019-10-25 DIAGNOSIS — N92.6 IRREGULAR MENSES: Primary | ICD-10-CM

## 2019-10-25 PROCEDURE — 99213 OFFICE O/P EST LOW 20 MIN: CPT | Performed by: OBSTETRICS & GYNECOLOGY

## 2019-10-25 RX ORDER — MEDROXYPROGESTERONE ACETATE 10 MG/1
10 TABLET ORAL DAILY
Qty: 10 TABLET | Refills: 0 | Status: SHIPPED | OUTPATIENT
Start: 2019-10-25 | End: 2019-11-07 | Stop reason: ALTCHOICE

## 2019-10-25 NOTE — PROGRESS NOTES
Shea Dougherty is a 30year old female. HPI:   Patient presents with: Other: No Menses - 52 days.  Negative Pregnancy test.Off OCP since 7/2019      Reviewed with pt  States wih last preg had sl irreg cycle but got pregnancy relatively easily    Will today's visit):  medroxyPROGESTERone Acetate 10 MG Oral Tab, Take 1 tablet (10 mg total) by mouth daily. For 10 days, Disp: 10 tablet, Rfl: 0  prenatal multivitamin plus DHA 27-0.8-228 MG Oral Cap, Take 1 capsule by mouth daily. , Disp: , Rfl:   Vitamin B-1

## 2019-11-01 NOTE — TELEPHONE ENCOUNTER
Pt took UPT today and negative; no unprotected intercourse x 2 weeks. Pt advised ok to start Provera. Pt was concerned because pharmacist told her it is teratogenic. Pt states she will take one more UPT to be sure before starting.   Pt advised it may aditi

## 2019-11-01 NOTE — TELEPHONE ENCOUNTER
Well   The pharmacist is incorrect     And I would be happy to discuss that with the pharmacist.    But we also previously reviewed that progestin is what maintains an early pregnancy and therefore, even if for some reason the upt was incorrect, nothing ba

## 2019-11-04 ENCOUNTER — LAB ENCOUNTER (OUTPATIENT)
Dept: LAB | Age: 29
End: 2019-11-04
Attending: OBSTETRICS & GYNECOLOGY
Payer: COMMERCIAL

## 2019-11-04 DIAGNOSIS — N92.6 IRREGULAR MENSES: ICD-10-CM

## 2019-11-04 PROCEDURE — 82670 ASSAY OF TOTAL ESTRADIOL: CPT | Performed by: OBSTETRICS & GYNECOLOGY

## 2019-11-04 PROCEDURE — 84146 ASSAY OF PROLACTIN: CPT | Performed by: OBSTETRICS & GYNECOLOGY

## 2019-11-04 PROCEDURE — 83002 ASSAY OF GONADOTROPIN (LH): CPT | Performed by: OBSTETRICS & GYNECOLOGY

## 2019-11-04 PROCEDURE — 84443 ASSAY THYROID STIM HORMONE: CPT | Performed by: OBSTETRICS & GYNECOLOGY

## 2019-11-04 PROCEDURE — 36415 COLL VENOUS BLD VENIPUNCTURE: CPT | Performed by: OBSTETRICS & GYNECOLOGY

## 2019-11-04 PROCEDURE — 83001 ASSAY OF GONADOTROPIN (FSH): CPT | Performed by: OBSTETRICS & GYNECOLOGY

## 2019-11-07 ENCOUNTER — OFFICE VISIT (OUTPATIENT)
Dept: NEUROLOGY | Facility: CLINIC | Age: 29
End: 2019-11-07
Payer: COMMERCIAL

## 2019-11-07 VITALS
WEIGHT: 139.5 LBS | BODY MASS INDEX: 25 KG/M2 | TEMPERATURE: 98 F | HEART RATE: 69 BPM | RESPIRATION RATE: 18 BRPM | DIASTOLIC BLOOD PRESSURE: 70 MMHG | OXYGEN SATURATION: 98 % | SYSTOLIC BLOOD PRESSURE: 124 MMHG

## 2019-11-07 DIAGNOSIS — R20.2 PARESTHESIA: Primary | ICD-10-CM

## 2019-11-07 DIAGNOSIS — G51.4 FACIAL TWITCHING: ICD-10-CM

## 2019-11-07 PROCEDURE — 99213 OFFICE O/P EST LOW 20 MIN: CPT | Performed by: OTHER

## 2019-11-07 NOTE — PROGRESS NOTES
HPI:    Patient ID: Jeremiah Benito is a 30year old female. Referring provider: Dr Bonna Buerger    HPI    Patient presented for follow up.  States she continues to have symptoms- intermittent tingling and numbness in both hands and feet and in different locati ENDOSCOPY,EXAM  approx 2015    peptic ulcers      Family History   Problem Relation Age of Onset   • Diabetes Father    • Hypertension Father         A-Fib as well   • Ear Problems Father    • Heart Disorder Father    • Hypertension Mother    • Cancer Moth Allergies:  Antihistamine Decon*        Comment:Tachycardia, as a child   PHYSICAL EXAM:   Physical Exam      Blood pressure 124/70, pulse 69, temperature 97.8 °F (36.6 °C), temperature source Oral, resp.  rate 18, weight 139 lb 8 oz (63.3 kg), SpO2 9 needed    See orders and medications filed with this encounter. The patient indicates understanding of these issues and agrees with the plan.       Ray Meza MD  [place] [address]        No orders of the defined types were placed in [facility]

## 2019-11-16 NOTE — ED PROVIDER NOTES
Patient Seen in: [facility] [place]    History   Patient presents with:  Cough/URI    Stated Complaint: CONGESTION/PAIFUL FACE    HPI    [place] Jessica is a 66-year-old female who presents today for evaluation of sinus congestion and facial pa Problem: Risk for Self Injury/Neglect  Goal: Treatment Goal: Remain safe during length of stay, learn and adopt new coping skills, and be free of self-injurious ideation, impulses and acts at the time of discharge  Outcome: Progressing  Goal: Verbalize thoughts and feelings  Description  Interventions:  - Assess and re-assess patient's lethality and potential for self-injury  - Engage patient in 1:1 interactions, daily, for a minimum of 15 minutes  - Encourage patient to express feelings, fears, frustrations, hopes  - Establish rapport/trust with patient   Outcome: Progressing  Goal: Attend and participate in unit activities, including therapeutic, recreational, and educational groups  Description  Interventions:  - Provide therapeutic and educational activities daily, encourage attendance and participation, and document same in the medical record  - Obtain collateral information, encourage visitation and family involvement in care   Outcome: Progressing     Problem: SUBSTANCE USE/ABUSE  Goal: By discharge, will develop insight into their chemical dependency and sustain motivation to continue in recovery  Description  INTERVENTIONS:  - Attends all daily group sessions and scheduled AA groups  - Actively practices coping skills through participation in the therapeutic community and adherence to program rules  - Reviews and completes assignments from individual treatment plan  - Assist patient development of understanding of their personal cycle of addiction and relapse triggers  Outcome: Progressing  Goal: By discharge, patient will have ongoing treatment plan addressing chemical dependency  Description  INTERVENTIONS:  - Assist patient with resources and/or appointments for ongoing recovery based living  Outcome: Progressing     Problem: SAFETY, RESTRAINT - VIOLENT/SELF-DESTRUCTIVE  Goal: Remains free of harm/injury from restraints (Restraint for Violent/Self-Destructive Behavior)  Description  INTERVENTIONS:  - (36.7 °C) (Temporal)   Resp 17   LMP 03/02/2019   SpO2 100%         Physical Exam   Constitutional: She is oriented to person, place, and time. She appears well-developed and well-nourished. No distress. HENT:   Head: Normocephalic and atraumatic.    Righ Instruct patient/family regarding restraint use   - Assess and monitor physiologic and psychological status   - Provide interventions and comfort measures to meet assessed patient needs   - Ensure continuous in person monitoring is provided   - Identify and implement measures to help patient regain control  - Assess readiness for release of restraint  Outcome: Progressing  Goal: Returns to optimal restraint-free functioning  Description  INTERVENTIONS:  - Assess the patient's behavior and symptoms that indicate continued need for restraint  - Identify and implement measures to help patient regain control  - Assess readiness for release of restraint   Outcome: Progressing     Problem: DISCHARGE PLANNING  Goal: Discharge to home or other facility with appropriate resources  Description  INTERVENTIONS:  - Identify barriers to discharge w/patient and caregiver  - Arrange for needed discharge resources and transportation as appropriate  - Identify discharge learning needs (meds, wound care, etc )  - Arrange for interpretive services to assist at discharge as needed  - Refer to Case Management Department for coordinating discharge planning if the patient needs post-hospital services based on physician/advanced practitioner order or complex needs related to functional status, cognitive ability, or social support system  Outcome: Progressing     Problem: Ineffective Coping  Goal: Participates in unit activities  Description  Interventions:  - Provide therapeutic environment   - Provide required programming   - Redirect inappropriate behaviors   Outcome: Progressing discharge. I discuss the plan of care with the patient, who expresses understanding. All questions and concerns are addressed to the patient's satisfaction prior to discharge today.             Disposition and Plan     Clinical Impression:  Acute non-recu

## 2019-11-23 ENCOUNTER — LAB ENCOUNTER (OUTPATIENT)
Dept: LAB | Age: 29
End: 2019-11-23
Attending: OBSTETRICS & GYNECOLOGY
Payer: COMMERCIAL

## 2019-11-23 DIAGNOSIS — N92.6 IRREGULAR MENSES: ICD-10-CM

## 2019-11-23 PROCEDURE — 84144 ASSAY OF PROGESTERONE: CPT | Performed by: OBSTETRICS & GYNECOLOGY

## 2019-11-23 PROCEDURE — 36415 COLL VENOUS BLD VENIPUNCTURE: CPT | Performed by: OBSTETRICS & GYNECOLOGY

## 2019-11-27 NOTE — PROGRESS NOTES
Pt turned and reposition with pillow placement for pressure areas. Call [phone] within reach. Bed Alarm on. Referring Provider: Grant Dexter MD    Reason for Referral:  Ms. Medardo Xavier had genetic testing performed on 6/23/17 because of a family history of postmenopausal breast cancer and Ashkenazi Latter day ancestry.      Genetic Testing Result:  Ms. Clive Wang te

## 2019-12-19 ENCOUNTER — PROCEDURE VISIT (OUTPATIENT)
Dept: NEUROLOGY | Facility: CLINIC | Age: 29
End: 2019-12-19
Payer: COMMERCIAL

## 2019-12-19 ENCOUNTER — TELEPHONE (OUTPATIENT)
Dept: NEUROLOGY | Facility: CLINIC | Age: 29
End: 2019-12-19

## 2019-12-19 DIAGNOSIS — R20.9 DISTURBANCE OF SKIN SENSATION: ICD-10-CM

## 2019-12-19 PROCEDURE — 95885 MUSC TST DONE W/NERV TST LIM: CPT | Performed by: OTHER

## 2019-12-19 PROCEDURE — 95912 NRV CNDJ TEST 11-12 STUDIES: CPT | Performed by: OTHER

## 2019-12-19 PROCEDURE — 95886 MUSC TEST DONE W/N TEST COMP: CPT | Performed by: OTHER

## 2019-12-19 NOTE — PROCEDURES
[facility] [place]  [address] [facility]  Phone- [phone]  Nerve Conduction & Electromyography Report            Patient: Benito Bustamante  Patient ID: [id number]  Sex: Female  YOB: 1990  Age: 30 Wrist ADM 3.07 ?3.60 12.0 ?5.0 100   ?50.00 Wrist - ADM 7        B. Elbow ADM 5.10  11.0  91.6 ?70   B. Elbow - Wrist 15 74 ?49      A. Elbow ADM 7.60  11.5  105 ?115   A. Elbow - B. Elbow 17 68 ?49   R COMM PERONEAL - EDB      Ankle EDB 5.36 ?6.20 2.9 ?2.0 Bilateral tibial, bilateral peroneal, R median and L ulnar motor responses were normal. The peroneal F waves were unclear and all other F waves were normal.     The needle study of selected muscles of the RLE (L3-S1 myotomes), the RUE (C5-T1 myotomes) and

## 2019-12-19 NOTE — TELEPHONE ENCOUNTER
Results sent on [employer].     ----- Message from Lia Pina MD sent at 12/19/2019  2:20 PM CST -----  Normal EMG study

## 2020-01-06 ENCOUNTER — TELEPHONE (OUTPATIENT)
Dept: OBGYN CLINIC | Facility: CLINIC | Age: 30
End: 2020-01-06

## 2020-01-06 NOTE — TELEPHONE ENCOUNTER
LMP: 12/3/19  EDC based on lmp: 20    8 wks on 20; appt on  at 8 wks        Are cycles regular?: yes    Medical problems: neutropenia    Past sx hx: denies    Current medications: PNV with DHA    Past pregnancy complications:  d

## 2020-01-06 NOTE — TELEPHONE ENCOUNTER
Patient calling to initiate prenatal care  LMP   Patient is 7-8 weeks Jan 23  Confirmation Ultrasound and Appointment scheduled on   Future Appointments   Date Time Provider Steve De La Cruz   1/28/2020 10:00 AM [facility] OB [facility]A CLEMENS [facility]

## 2020-01-28 ENCOUNTER — OFFICE VISIT (OUTPATIENT)
Dept: OBGYN CLINIC | Facility: CLINIC | Age: 30
End: 2020-01-28
Payer: COMMERCIAL

## 2020-01-28 ENCOUNTER — ULTRASOUND ENCOUNTER (OUTPATIENT)
Dept: OBGYN CLINIC | Facility: CLINIC | Age: 30
End: 2020-01-28
Payer: COMMERCIAL

## 2020-01-28 VITALS
SYSTOLIC BLOOD PRESSURE: 104 MMHG | BODY MASS INDEX: 24.63 KG/M2 | HEIGHT: 63 IN | DIASTOLIC BLOOD PRESSURE: 66 MMHG | HEART RATE: 85 BPM | WEIGHT: 139 LBS

## 2020-01-28 DIAGNOSIS — N91.2 AMENORRHEA: Primary | ICD-10-CM

## 2020-01-28 DIAGNOSIS — Z32.01 PREGNANCY EXAMINATION OR TEST, POSITIVE RESULT: ICD-10-CM

## 2020-01-28 PROBLEM — Z30.41 ENCOUNTER FOR SURVEILLANCE OF CONTRACEPTIVE PILLS: Status: RESOLVED | Noted: 2018-04-24 | Resolved: 2020-01-28

## 2020-01-28 PROBLEM — Z01.419 WELL WOMAN EXAM WITH ROUTINE GYNECOLOGICAL EXAM: Status: RESOLVED | Noted: 2018-04-24 | Resolved: 2020-01-28

## 2020-01-28 PROCEDURE — 99213 OFFICE O/P EST LOW 20 MIN: CPT | Performed by: OBSTETRICS & GYNECOLOGY

## 2020-01-28 PROCEDURE — 76817 TRANSVAGINAL US OBSTETRIC: CPT | Performed by: OBSTETRICS & GYNECOLOGY

## 2020-01-28 RX ORDER — MELATONIN
1000 DAILY
COMMUNITY
End: 2021-10-06

## 2020-01-28 NOTE — PATIENT INSTRUCTIONS
Genetic screening     1) First trimester screening:  - Performed at 13 weeks and includes blood test and ultrasound  - Screens for Trisomy 13, 18 and 21 (Down Syndrome)   - You will need to schedule an appointment with the Maternal Fetal Medicine office  - - Please verify insurance coverage:   CPT code: 36701  Diagnosis code: Cystic fibrosis screening - Z13.228    -------------------------------------------------            Foods to Avoid During Pregnancy  ? [address] [place] [address] may eat deli meats from the [employer]. Diarrhea: Imodium, Maalox Anti-Diarrheal or Kaopectate  Contact the office if you have diarrhea for more than 3 days.     Allergies: Benadryl, Claritin or Zyrtec    Hemorrhoids: Tucks pads, Preparation H, Annusol, Sitz bath or Witch hazel  Eat a high fiber

## 2020-01-28 NOTE — PROGRESS NOTES
[facility]  Obstetrics and Gynecology    Subjective:     Blaze Hernandez is a 30year old  female presents with c/o secondary amenorrhea and positive pregnancy test. The patient was recommended to return for further evaluation.  The patien trimester screening   - advised to follow up to establish prenatal care   - SAB precautions provided   - d/w nausea and vomiting in pregnancy including vitamin B 6 and unisom   Hx of PTD  - PPROM at 34w4d on 01/18/2017  - d/w patient recommendation for 17

## 2020-02-03 ENCOUNTER — TELEPHONE (OUTPATIENT)
Dept: OBGYN CLINIC | Facility: CLINIC | Age: 30
End: 2020-02-03

## 2020-02-03 RX ORDER — METOCLOPRAMIDE 10 MG/1
10 TABLET ORAL 3 TIMES DAILY PRN
Qty: 30 TABLET | Refills: 0 | Status: SHIPPED | OUTPATIENT
Start: 2020-02-03 | End: 2020-02-21

## 2020-02-03 NOTE — TELEPHONE ENCOUNTER
Patient states that her morning sickness is getting worse after taking the medications.  Please call to advise

## 2020-02-03 NOTE — TELEPHONE ENCOUNTER
Patient notified. Patient verbalized understanding. Pt advised to call if no improvement or worsening symptoms. Med ordered.

## 2020-02-03 NOTE — TELEPHONE ENCOUNTER
Pt seen on 1/28/20 for  US and appt. Pt started B6 100mg with Unisom; has increased to B6 200mg. Pt states nausea is worsening; denies vomiting. Pt states she is nauseated all day; unable to eat most foods other than toast and cheerios.   Drinking leon

## 2020-02-21 ENCOUNTER — LAB ENCOUNTER (OUTPATIENT)
Dept: LAB | Age: 30
End: 2020-02-21
Attending: NURSE PRACTITIONER
Payer: COMMERCIAL

## 2020-02-21 ENCOUNTER — HOSPITAL ENCOUNTER (OUTPATIENT)
Dept: ULTRASOUND IMAGING | Age: 30
Discharge: STILL ADMITTED OR ADMINISTRATIVE | End: 2020-02-21
Attending: NURSE PRACTITIONER
Payer: COMMERCIAL

## 2020-02-21 ENCOUNTER — INITIAL PRENATAL (OUTPATIENT)
Dept: OBGYN CLINIC | Facility: CLINIC | Age: 30
End: 2020-02-21
Payer: COMMERCIAL

## 2020-02-21 ENCOUNTER — HOSPITAL ENCOUNTER (OUTPATIENT)
Age: 30
Discharge: STILL ADMITTED OR ADMINISTRATIVE | End: 2020-02-21
Attending: NURSE PRACTITIONER
Payer: COMMERCIAL

## 2020-02-21 VITALS
HEIGHT: 62.5 IN | SYSTOLIC BLOOD PRESSURE: 112 MMHG | DIASTOLIC BLOOD PRESSURE: 60 MMHG | BODY MASS INDEX: 25.26 KG/M2 | WEIGHT: 140.81 LBS

## 2020-02-21 DIAGNOSIS — M79.661 RIGHT CALF PAIN: ICD-10-CM

## 2020-02-21 DIAGNOSIS — Z34.81 ENCOUNTER FOR SUPERVISION OF OTHER NORMAL PREGNANCY IN FIRST TRIMESTER: ICD-10-CM

## 2020-02-21 DIAGNOSIS — Z87.59 HISTORY OF PRETERM PREMATURE RUPTURE OF MEMBRANES (PPROM): ICD-10-CM

## 2020-02-21 DIAGNOSIS — Z36.9 PRENATAL SCREENING ENCOUNTER: Primary | ICD-10-CM

## 2020-02-21 LAB
ANTIBODY SCREEN: NEGATIVE
BASOPHILS # BLD AUTO: 0.05 X10(3) UL (ref 0–0.2)
BASOPHILS NFR BLD AUTO: 1 %
DEPRECATED RDW RBC AUTO: 39.1 FL (ref 35.1–46.3)
EOSINOPHIL # BLD AUTO: 0.18 X10(3) UL (ref 0–0.7)
EOSINOPHIL NFR BLD AUTO: 3.5 %
ERYTHROCYTE [DISTWIDTH] IN BLOOD BY AUTOMATED COUNT: 11.9 % (ref 11–15)
HBV SURFACE AG SER-ACNC: 0.15 [IU]/L
HBV SURFACE AG SERPL QL IA: NONREACTIVE
HCT VFR BLD AUTO: 32.6 % (ref 35–48)
HGB BLD-MCNC: 11.2 G/DL (ref 12–16)
IMM GRANULOCYTES # BLD AUTO: 0 X10(3) UL (ref 0–1)
IMM GRANULOCYTES NFR BLD: 0 %
LYMPHOCYTES # BLD AUTO: 1.53 X10(3) UL (ref 1–4)
LYMPHOCYTES NFR BLD AUTO: 29.8 %
MCH RBC QN AUTO: 31 PG (ref 26–34)
MCHC RBC AUTO-ENTMCNC: 34.4 G/DL (ref 31–37)
MCV RBC AUTO: 90.3 FL (ref 80–100)
MONOCYTES # BLD AUTO: 0.36 X10(3) UL (ref 0.1–1)
MONOCYTES NFR BLD AUTO: 7 %
MULTISTIX LOT#: NORMAL NUMERIC
NEUTROPHILS # BLD AUTO: 3.02 X10 (3) UL (ref 1.5–7.7)
NEUTROPHILS # BLD AUTO: 3.02 X10(3) UL (ref 1.5–7.7)
NEUTROPHILS NFR BLD AUTO: 58.7 %
PLATELET # BLD AUTO: 218 10(3)UL (ref 150–450)
RBC # BLD AUTO: 3.61 X10(6)UL (ref 3.8–5.3)
RH BLOOD TYPE: POSITIVE
RUBV IGG SER QL: POSITIVE
RUBV IGG SER-ACNC: 287 IU/ML (ref 10–?)
T PALLIDUM AB SER QL IA: NONREACTIVE
WBC # BLD AUTO: 5.1 X10(3) UL (ref 4–11)

## 2020-02-21 PROCEDURE — 86762 RUBELLA ANTIBODY: CPT | Performed by: NURSE PRACTITIONER

## 2020-02-21 PROCEDURE — 85025 COMPLETE CBC W/AUTO DIFF WBC: CPT | Performed by: NURSE PRACTITIONER

## 2020-02-21 PROCEDURE — 36415 COLL VENOUS BLD VENIPUNCTURE: CPT | Performed by: NURSE PRACTITIONER

## 2020-02-21 PROCEDURE — 87340 HEPATITIS B SURFACE AG IA: CPT | Performed by: NURSE PRACTITIONER

## 2020-02-21 PROCEDURE — 86780 TREPONEMA PALLIDUM: CPT | Performed by: NURSE PRACTITIONER

## 2020-02-21 PROCEDURE — 86901 BLOOD TYPING SEROLOGIC RH(D): CPT | Performed by: NURSE PRACTITIONER

## 2020-02-21 PROCEDURE — 93971 EXTREMITY STUDY: CPT | Performed by: NURSE PRACTITIONER

## 2020-02-21 PROCEDURE — 81002 URINALYSIS NONAUTO W/O SCOPE: CPT | Performed by: NURSE PRACTITIONER

## 2020-02-21 PROCEDURE — 87389 HIV-1 AG W/HIV-1&-2 AB AG IA: CPT | Performed by: NURSE PRACTITIONER

## 2020-02-21 PROCEDURE — 86900 BLOOD TYPING SEROLOGIC ABO: CPT | Performed by: NURSE PRACTITIONER

## 2020-02-21 PROCEDURE — 87086 URINE CULTURE/COLONY COUNT: CPT | Performed by: NURSE PRACTITIONER

## 2020-02-21 PROCEDURE — 86850 RBC ANTIBODY SCREEN: CPT | Performed by: NURSE PRACTITIONER

## 2020-02-21 RX ORDER — PYRIDOXINE HCL (VITAMIN B6) 50 MG
200 TABLET ORAL
COMMUNITY
End: 2020-04-20 | Stop reason: ALTCHOICE

## 2020-02-21 NOTE — PROGRESS NOTES
Here for initial prenatal visit with our group. 30year old  at [address] by LMP. Patient's last menstrual period was 2019 (exact date).   Last pap smear was 2019 and it was normal.    She has had some morning sickness, Recent diarrhea and ca

## 2020-02-24 ENCOUNTER — TELEPHONE (OUTPATIENT)
Dept: OBGYN CLINIC | Facility: CLINIC | Age: 30
End: 2020-02-24

## 2020-02-24 NOTE — TELEPHONE ENCOUNTER
Pt last seen in office on Friday for NOB; reported diarrhea and right calf pain at that time. Pt advised her doppler was negative for DVT.   Pt reports continued diarrhea since Friday; several times during the day and sometimes at night; some abd crampin

## 2020-02-24 NOTE — TELEPHONE ENCOUNTER
Patient is still having diarrhea. She had mentioned this at her last appointment. It has not gone away.   Patient is also calling for doppler results

## 2020-02-24 NOTE — TELEPHONE ENCOUNTER
Agree with your recommendations, she can also call her PCP to discuss her GI symptoms possible viral gastroenteritis.

## 2020-03-20 ENCOUNTER — TELEPHONE (OUTPATIENT)
Dept: OBGYN CLINIC | Facility: CLINIC | Age: 30
End: 2020-03-20

## 2020-03-20 ENCOUNTER — ROUTINE PRENATAL (OUTPATIENT)
Dept: OBGYN CLINIC | Facility: CLINIC | Age: 30
End: 2020-03-20
Payer: COMMERCIAL

## 2020-03-20 VITALS — SYSTOLIC BLOOD PRESSURE: 102 MMHG | WEIGHT: 143 LBS | BODY MASS INDEX: 26 KG/M2 | DIASTOLIC BLOOD PRESSURE: 68 MMHG

## 2020-03-20 DIAGNOSIS — O09.92 HIGH-RISK PREGNANCY IN SECOND TRIMESTER: ICD-10-CM

## 2020-03-20 DIAGNOSIS — Z36.9 PRENATAL SCREENING ENCOUNTER: Primary | ICD-10-CM

## 2020-03-20 LAB
GLUCOSE (URINE DIPSTICK): NEGATIVE MG/DL
MULTISTIX LOT#: NORMAL NUMERIC

## 2020-03-20 PROCEDURE — 81002 URINALYSIS NONAUTO W/O SCOPE: CPT | Performed by: OBSTETRICS & GYNECOLOGY

## 2020-03-20 NOTE — TELEPHONE ENCOUNTER
Per Dr. Jadon Young, pt to start [facility] or [facility] next week.

## 2020-03-20 NOTE — PROGRESS NOTES
No C/O, forcing herself to eat  Stop B6/Unisom  17 OH P, discussed, she wants it  MFM for Level II scan  4 weeks

## 2020-03-24 NOTE — TELEPHONE ENCOUNTER
Called Rashaun Quezada at [facility] to get information on ordering. Left message on answering machine to call back.

## 2020-03-26 NOTE — TELEPHONE ENCOUNTER
Left message on answering machine to call back. Benefit investigation is in process for pt to receive Patsy. Pt will be contacted as soon as we receive notification.

## 2020-03-26 NOTE — TELEPHONE ENCOUNTER
Pt called stating she is to start fabian this week, havent heard from office so wanted to follow up.

## 2020-03-27 NOTE — TELEPHONE ENCOUNTER
Spoke to Rashaun Quezada. She stated she is faxing over PA form. It will take 15 days for decision, but they can send courtesy dose in the meantime. PA form completed and faxed with clinicals to [phone]. In clinical box in [place].

## 2020-03-31 NOTE — TELEPHONE ENCOUNTER
Patient is still waiting for her [place]. She is also experiencing abdominal pain. Please call to discuss.

## 2020-03-31 NOTE — TELEPHONE ENCOUNTER
17wod; last seen in office on 3/20/20. Patsy to be initiated ASAP per Dr. Paola Navarrete. Paperwork has been completed; awaiting approval; pt advised. Pt states her insurance is changing tomorrow.   Pt states she called last Tuesday and Thursday and informed whoe

## 2020-03-31 NOTE — TELEPHONE ENCOUNTER
Robbi Herndon called and would like our office to add to the RX \"May not substitute brand medication\"  Please call Robbi Herndon with questions at:  [phone] x [phone]

## 2020-04-02 NOTE — TELEPHONE ENCOUNTER
Patient had insurance change as of 4/1/20. Patsy form completed again with new insurance information; signed and faxed to [facility]. Hold for determination.

## 2020-04-06 NOTE — TELEPHONE ENCOUNTER
Received fax from [facility]. Medication is covered. Will need prior auth or predetermination depending on pharmacy used. Call to [address]  at [phone]; spoke with Whitney and then Makayla. Transferred to pharmacy [facility]. rx given.  They will start

## 2020-04-08 NOTE — TELEPHONE ENCOUNTER
Received fax with prior auth form to complete. Form completed and faxed to [facility]. Copy to RN desk in [address]    Call to [facility] at [phone]; spoke with [facility]. Call dropped. Called back; spoke with Lady Jackman. Transferred to injectable medication dept.  Ca

## 2020-04-09 ENCOUNTER — TELEPHONE (OUTPATIENT)
Dept: OBGYN CLINIC | Facility: CLINIC | Age: 30
End: 2020-04-09

## 2020-04-09 NOTE — TELEPHONE ENCOUNTER
Patient returned call. Updated her on status. Assisted with scheduling appt for injection tomorrow in [facility] office.

## 2020-04-09 NOTE — TELEPHONE ENCOUNTER
Call from Rashaun Quezada at [facility]. She is faxing information to [place] office. Auto injector is not covered but this can be appealed if desired.   They have started pt on auto injector as a courtesy; received one injector today in [address]

## 2020-04-09 NOTE — TELEPHONE ENCOUNTER
Call to patient; no answer. Left message to call back. Will try to have patient come in on Friday, April 10 for first dose of [place]. In [place]    Appeal letter drafted and on chart for Dr. Vanesa Feldman to review before faxing.  Once approved, can fax to [phone]

## 2020-04-09 NOTE — TELEPHONE ENCOUNTER
Received fax from [employer]. PA not approved.      Per information on fax, auto inject Aura Duos is only approved if documentation of the following:  - Intolerance or inability to obtain hydroxyprogesterone caproate injection  - Current canales pregnancy  - Prev

## 2020-04-09 NOTE — TELEPHONE ENCOUNTER
Pharmacy called in regarding a prior auth for a rx.   Patsy - she said it was denied because a generic was not sent first.  She will also fax over the denial.  Please advise

## 2020-04-10 ENCOUNTER — OFFICE VISIT (OUTPATIENT)
Dept: OBGYN CLINIC | Facility: CLINIC | Age: 30
End: 2020-04-10
Payer: COMMERCIAL

## 2020-04-10 VITALS
SYSTOLIC BLOOD PRESSURE: 102 MMHG | DIASTOLIC BLOOD PRESSURE: 70 MMHG | WEIGHT: 146 LBS | HEIGHT: 63 IN | BODY MASS INDEX: 25.87 KG/M2

## 2020-04-10 DIAGNOSIS — O09.92 HIGH-RISK PREGNANCY IN SECOND TRIMESTER: Primary | ICD-10-CM

## 2020-04-10 PROCEDURE — 96372 THER/PROPH/DIAG INJ SC/IM: CPT | Performed by: OBSTETRICS & GYNECOLOGY

## 2020-04-14 NOTE — TELEPHONE ENCOUNTER
Returned Carolyne's call. No answer. LM to call back. Call to patient. No answer.  Left detailed message on VM, per DHAVAL ok, letting her know that we are still working on getting medication and connecting with our rep at [facility] who helps with coordinating

## 2020-04-16 ENCOUNTER — TELEPHONE (OUTPATIENT)
Dept: OBGYN CLINIC | Facility: CLINIC | Age: 30
End: 2020-04-16

## 2020-04-16 NOTE — TELEPHONE ENCOUNTER
Left message on pt's VM to update her and let her know that medication will be available at visit tomorrow.

## 2020-04-16 NOTE — TELEPHONE ENCOUNTER
Call to [place]. No answer. Spoke with another care manager. He was unable to help. Attempted to transfer to [place]. She is still on a call. TCB.

## 2020-04-16 NOTE — TELEPHONE ENCOUNTER
Spoke with Rashaun Quezada.   She stated that she will reach out to the insurance to see if there is any update on appeal. She stated if the insurance denies the appeal they will keep patient on assistance program. If it is still pending, they will continue to sen

## 2020-04-17 ENCOUNTER — TELEPHONE (OUTPATIENT)
Dept: OBGYN CLINIC | Facility: CLINIC | Age: 30
End: 2020-04-17

## 2020-04-17 NOTE — TELEPHONE ENCOUNTER
Patient's last [place] was 04/10/2020. She is concerned that she will be 2 days late for her injection if she comes on 04/20/2020. I explained to patient that given the circumstances no much we are able to do.    Given that it is only a 2 day difference, s

## 2020-04-17 NOTE — TELEPHONE ENCOUNTER
Patient was supposed to get her Patsy today. She is upset that she will have to wait until next week.  Please call to advise

## 2020-04-17 NOTE — TELEPHONE ENCOUNTER
Please advise that unfortunately we had to postpone her visit till Monday due to unforseen circumstances. At this time, the benefit of postponing her office visit outweigh the risk of her office visit today.    Patsy GIPSON recommendation has recently been c

## 2020-04-20 ENCOUNTER — ROUTINE PRENATAL (OUTPATIENT)
Dept: OBGYN CLINIC | Facility: CLINIC | Age: 30
End: 2020-04-20
Payer: COMMERCIAL

## 2020-04-20 VITALS — WEIGHT: 147 LBS | SYSTOLIC BLOOD PRESSURE: 106 MMHG | BODY MASS INDEX: 26 KG/M2 | DIASTOLIC BLOOD PRESSURE: 70 MMHG

## 2020-04-20 DIAGNOSIS — Z87.51 HISTORY OF PRETERM DELIVERY: ICD-10-CM

## 2020-04-20 DIAGNOSIS — Z36.9 PRENATAL SCREENING ENCOUNTER: Primary | ICD-10-CM

## 2020-04-20 PROCEDURE — 96372 THER/PROPH/DIAG INJ SC/IM: CPT | Performed by: OBSTETRICS & GYNECOLOGY

## 2020-04-20 PROCEDURE — 81002 URINALYSIS NONAUTO W/O SCOPE: CPT | Performed by: OBSTETRICS & GYNECOLOGY

## 2020-04-20 NOTE — PROGRESS NOTES
BRIGIDO--19w6d    Doing ok. Upset bc appt was cancelled on Friday for Patsy, concerned about 3 day delay. Not sure if she should continue it. Denies Vb, LOF, contractions. Positive fetal movement. A/P:   1. FHT-P  2.  PNL: 1 hour/CBC ordered for 24+ we

## 2020-04-22 NOTE — TELEPHONE ENCOUNTER
Received call from Rashaun Quezada. She states that appeal was not received by insurance. Will need to be sent again. She will fax details to office so that we can make sure we are submitting correct details. Letter reprinted along with pregnancy episode.    Jimena

## 2020-04-23 NOTE — PROGRESS NOTES
[facility] Consultation    Dear Dr. Guilherme Kraus    Thank you for requesting ultrasound evaluation and maternal fetal medicine consultation on your patient Robe Joseph.   As you are aware she is a 30year old female  with a sin by mouth daily. , Disp: , Rfl:   •  Ascorbic Acid (VITAMIN C) 1000 MG Oral Tab, Take 1,000 mg by mouth daily. , Disp: , Rfl:   •  Cholecalciferol (VITAMIN D) 1000 units Oral Tab, Take 1 capsule by mouth.  , Disp: , Rfl:   Allergies:   Antihistamine Decon* Placenta: posterior. Fetal Anatomy:  Visualized with normal appearance: head, face, spine, neck, skin, chest, abdominal wall, gastrointestinal tract, kidneys, bladder, extremities, skeleton.     Brain: Visualized and normal appearance: brain parenchym  BIRTH  History  Patient presented at 34 weeks and 4 days with  premature rupture membranes and early latent labor. She subsequently delivered. Fortunately her child is alive and doing well without sequelae of prematurity.   Patient has star pyelonephritis. This likely results from transient low-grade bacteremia and/or endotoxemia leading to systemic inflammatory and immune responses that result in  labor.    Prevention as well as early detection and treatment of some of these types of i preventive therapy for  labor and  delivery with weekly 17-OH progesterone caproate weekly beginning between 16-20 weeks of gestation and continuing up to 36 weeks or until delivery.  This therapy had been shown to reduce the chance of  pulmonary) make it plausible that women could be at risk for severe respiratory complications. Women should take all the same precautions being asked of the whole population to reduce their risk for infection.   Pregnant women with respiratory symptoms sara of  labor  · The patient will not continue with 17-OH-progesterone. Thank you for allowing me to participate in the care of your patient. Please do not hesitate to contact me if additional questions or concerns arise. Denisha Palumbo.  Maile Quintero M.D.

## 2020-04-24 ENCOUNTER — OFFICE VISIT (OUTPATIENT)
Dept: PERINATAL CARE | Facility: HOSPITAL | Age: 30
End: 2020-04-24
Attending: OBSTETRICS & GYNECOLOGY
Payer: COMMERCIAL

## 2020-04-24 VITALS
HEART RATE: 81 BPM | DIASTOLIC BLOOD PRESSURE: 70 MMHG | SYSTOLIC BLOOD PRESSURE: 109 MMHG | HEIGHT: 63 IN | BODY MASS INDEX: 26.05 KG/M2 | WEIGHT: 147 LBS

## 2020-04-24 DIAGNOSIS — Z87.59 HISTORY OF PRETERM PREMATURE RUPTURE OF MEMBRANES (PPROM): ICD-10-CM

## 2020-04-24 PROCEDURE — 76817 TRANSVAGINAL US OBSTETRIC: CPT | Performed by: OBSTETRICS & GYNECOLOGY

## 2020-04-24 PROCEDURE — 99203 OFFICE O/P NEW LOW 30 MIN: CPT | Performed by: OBSTETRICS & GYNECOLOGY

## 2020-04-24 PROCEDURE — 76811 OB US DETAILED SNGL FETUS: CPT | Performed by: OBSTETRICS & GYNECOLOGY

## 2020-04-24 NOTE — TELEPHONE ENCOUNTER
Returned patient's call. She had an appointment today with Dr. Kayleigh Lopes and based on her conversation with him and his recommendations, she would like to discontinue use of [place] at this time. Appointments updated to reflect this.     Dr. Garcia Men notified of

## 2020-04-30 ENCOUNTER — TELEPHONE (OUTPATIENT)
Dept: OBGYN CLINIC | Facility: CLINIC | Age: 30
End: 2020-04-30

## 2020-04-30 NOTE — TELEPHONE ENCOUNTER
Call from Shannan from [facility] requesting update from patient insurance. Advised Shannan that Ana Izquierdo has denied the appeal.  Chadwick Blackman that patient no longer wants [place]. Lila verbalized understanding.

## 2020-04-30 NOTE — TELEPHONE ENCOUNTER
Received call from Cindy aguilar Corona that the appeal for the [facility] has been denied by medical director. Office will receive letter.

## 2020-05-14 ENCOUNTER — TELEPHONE (OUTPATIENT)
Dept: OBGYN CLINIC | Facility: CLINIC | Age: 30
End: 2020-05-14

## 2020-05-14 NOTE — TELEPHONE ENCOUNTER
Called Emily back; she checks in monthly regarding patient care. She speaks regularly with patient as well. Vanessa Valdes that patient saw M on 4/24/20 and next appt with our office is 5/18/20.

## 2020-05-14 NOTE — TELEPHONE ENCOUNTER
Tami Carrion  from [facility] calling to discuss any new updates with pt's pregnancy.     Requesting call back from RN

## 2020-05-18 ENCOUNTER — ROUTINE PRENATAL (OUTPATIENT)
Dept: OBGYN CLINIC | Facility: CLINIC | Age: 30
End: 2020-05-18
Payer: COMMERCIAL

## 2020-05-18 VITALS
HEIGHT: 63 IN | DIASTOLIC BLOOD PRESSURE: 62 MMHG | WEIGHT: 150 LBS | BODY MASS INDEX: 26.58 KG/M2 | SYSTOLIC BLOOD PRESSURE: 118 MMHG

## 2020-05-18 DIAGNOSIS — Z36.9 PRENATAL SCREENING ENCOUNTER: Primary | ICD-10-CM

## 2020-05-18 DIAGNOSIS — O09.92 HIGH-RISK PREGNANCY IN SECOND TRIMESTER: ICD-10-CM

## 2020-05-18 PROCEDURE — 3074F SYST BP LT 130 MM HG: CPT | Performed by: OBSTETRICS & GYNECOLOGY

## 2020-05-18 PROCEDURE — 3008F BODY MASS INDEX DOCD: CPT | Performed by: OBSTETRICS & GYNECOLOGY

## 2020-05-18 PROCEDURE — 3078F DIAST BP <80 MM HG: CPT | Performed by: OBSTETRICS & GYNECOLOGY

## 2020-05-18 PROCEDURE — 81002 URINALYSIS NONAUTO W/O SCOPE: CPT | Performed by: OBSTETRICS & GYNECOLOGY

## 2020-05-18 NOTE — PROGRESS NOTES
No C/O, good FM  Stopped 17 OH Progesterone, no vaginal progesterone  GL to be done  Doing well  4 weeks

## 2020-05-22 ENCOUNTER — LAB ENCOUNTER (OUTPATIENT)
Dept: LAB | Age: 30
End: 2020-05-22
Attending: OBSTETRICS & GYNECOLOGY
Payer: COMMERCIAL

## 2020-05-22 DIAGNOSIS — Z87.51 HISTORY OF PRETERM DELIVERY: ICD-10-CM

## 2020-05-22 PROCEDURE — 82950 GLUCOSE TEST: CPT

## 2020-05-22 PROCEDURE — 85025 COMPLETE CBC W/AUTO DIFF WBC: CPT

## 2020-05-22 PROCEDURE — 36415 COLL VENOUS BLD VENIPUNCTURE: CPT

## 2020-06-19 ENCOUNTER — ROUTINE PRENATAL (OUTPATIENT)
Dept: OBGYN CLINIC | Facility: CLINIC | Age: 30
End: 2020-06-19
Payer: COMMERCIAL

## 2020-06-19 VITALS
WEIGHT: 152 LBS | TEMPERATURE: 98 F | DIASTOLIC BLOOD PRESSURE: 64 MMHG | SYSTOLIC BLOOD PRESSURE: 108 MMHG | BODY MASS INDEX: 27 KG/M2

## 2020-06-19 DIAGNOSIS — Z36.9 PRENATAL SCREENING ENCOUNTER: Primary | ICD-10-CM

## 2020-06-19 DIAGNOSIS — Z87.59 HISTORY OF PRETERM PREMATURE RUPTURE OF MEMBRANES (PPROM): ICD-10-CM

## 2020-06-19 DIAGNOSIS — O09.92 HIGH-RISK PREGNANCY IN SECOND TRIMESTER: ICD-10-CM

## 2020-06-19 PROCEDURE — 87389 HIV-1 AG W/HIV-1&-2 AB AG IA: CPT | Performed by: OBSTETRICS & GYNECOLOGY

## 2020-06-19 PROCEDURE — 81002 URINALYSIS NONAUTO W/O SCOPE: CPT | Performed by: OBSTETRICS & GYNECOLOGY

## 2020-06-19 PROCEDURE — 90715 TDAP VACCINE 7 YRS/> IM: CPT | Performed by: OBSTETRICS & GYNECOLOGY

## 2020-06-19 PROCEDURE — 90471 IMMUNIZATION ADMIN: CPT | Performed by: OBSTETRICS & GYNECOLOGY

## 2020-06-19 NOTE — PROGRESS NOTES
BRIGIDO--28w3d    Doing well. Denies Vb, LOF, contractions. Positive fetal movement. Having some issues with constipation    A/P:   1. FHT-P  2. PNL: 1 hour normal. HIV today  3. RH positive  4. TDAP today  5.  Anemia: continue iron daily, rec adding metamuci

## 2020-07-06 ENCOUNTER — ROUTINE PRENATAL (OUTPATIENT)
Dept: OBGYN CLINIC | Facility: CLINIC | Age: 30
End: 2020-07-06
Payer: COMMERCIAL

## 2020-07-06 VITALS
DIASTOLIC BLOOD PRESSURE: 60 MMHG | SYSTOLIC BLOOD PRESSURE: 102 MMHG | BODY MASS INDEX: 28 KG/M2 | WEIGHT: 155.63 LBS | TEMPERATURE: 99 F

## 2020-07-06 DIAGNOSIS — Z87.59 HISTORY OF PRETERM PREMATURE RUPTURE OF MEMBRANES (PPROM): ICD-10-CM

## 2020-07-06 DIAGNOSIS — Z36.9 PRENATAL SCREENING ENCOUNTER: ICD-10-CM

## 2020-07-06 DIAGNOSIS — Z3A.30 30 WEEKS GESTATION OF PREGNANCY: Primary | ICD-10-CM

## 2020-07-06 LAB — MULTISTIX LOT#: NORMAL NUMERIC

## 2020-07-06 PROCEDURE — 81002 URINALYSIS NONAUTO W/O SCOPE: CPT | Performed by: OBSTETRICS & GYNECOLOGY

## 2020-07-06 NOTE — PATIENT INSTRUCTIONS
FETAL MOVEMENT CHART    Begin counting fetal movements at 32 weeks of pregnancy. You may find that your baby is more active after eating or drinking. We want you to time how long it takes to feel 10 movements (kicks, flutters, swishes or rolls).   Arnaud Watkins

## 2020-07-06 NOTE — PROGRESS NOTES
BRIGIDO  Doing well, +FM  Denies VB/LOF/uctx  Rh +, TDAP received,  mfm u/s in 1 wk  RTC in 2 wks  Fetal movement instructions given

## 2020-07-17 ENCOUNTER — OFFICE VISIT (OUTPATIENT)
Dept: PERINATAL CARE | Facility: HOSPITAL | Age: 30
End: 2020-07-17
Attending: OBSTETRICS & GYNECOLOGY
Payer: COMMERCIAL

## 2020-07-17 VITALS
HEART RATE: 99 BPM | BODY MASS INDEX: 27.64 KG/M2 | WEIGHT: 156 LBS | SYSTOLIC BLOOD PRESSURE: 115 MMHG | HEIGHT: 63 IN | DIASTOLIC BLOOD PRESSURE: 74 MMHG

## 2020-07-17 DIAGNOSIS — Z87.59 HISTORY OF PRETERM PREMATURE RUPTURE OF MEMBRANES (PPROM): ICD-10-CM

## 2020-07-17 PROCEDURE — 76819 FETAL BIOPHYS PROFIL W/O NST: CPT | Performed by: OBSTETRICS & GYNECOLOGY

## 2020-07-17 PROCEDURE — 99213 OFFICE O/P EST LOW 20 MIN: CPT | Performed by: OBSTETRICS & GYNECOLOGY

## 2020-07-17 PROCEDURE — 76816 OB US FOLLOW-UP PER FETUS: CPT | Performed by: OBSTETRICS & GYNECOLOGY

## 2020-07-17 NOTE — PROGRESS NOTES
Carl Baca    Dear Dr. Milo Cartwright    Thank you for requesting ultrasound evaluation and maternal fetal medicine consultation on your patient Desirae Swift.   As you are aware she is a 26year old female  with a sing cephalic. Placenta: posterior. Fetal Anatomy:  Visualized with normal appearance: spine, kidneys, bladder. Brain: Visualized and normal appearance: Cisterna Magna. Gastrointestinal Tract: stomach visible.     Summary of Ultrasound Findings:  Cassiee

## 2020-07-20 ENCOUNTER — ROUTINE PRENATAL (OUTPATIENT)
Dept: OBGYN CLINIC | Facility: CLINIC | Age: 30
End: 2020-07-20
Payer: COMMERCIAL

## 2020-07-20 VITALS — WEIGHT: 158 LBS | BODY MASS INDEX: 28 KG/M2 | SYSTOLIC BLOOD PRESSURE: 112 MMHG | DIASTOLIC BLOOD PRESSURE: 66 MMHG

## 2020-07-20 DIAGNOSIS — Z87.59 HISTORY OF PRETERM PREMATURE RUPTURE OF MEMBRANES (PPROM): ICD-10-CM

## 2020-07-20 DIAGNOSIS — Z36.9 PRENATAL SCREENING ENCOUNTER: Primary | ICD-10-CM

## 2020-07-20 LAB
GLUCOSE (URINE DIPSTICK): NEGATIVE MG/DL
MULTISTIX LOT#: NORMAL NUMERIC
PROTEIN (URINE DIPSTICK): NEGATIVE MG/DL

## 2020-07-20 PROCEDURE — 3078F DIAST BP <80 MM HG: CPT | Performed by: OBSTETRICS & GYNECOLOGY

## 2020-07-20 PROCEDURE — 81002 URINALYSIS NONAUTO W/O SCOPE: CPT | Performed by: OBSTETRICS & GYNECOLOGY

## 2020-07-20 PROCEDURE — 3074F SYST BP LT 130 MM HG: CPT | Performed by: OBSTETRICS & GYNECOLOGY

## 2020-07-21 NOTE — PROGRESS NOTES
BRIGIDO 32w6d    Doing ok, +FM had decreased yesterday but normal kick count today  Nervous because lots of sharp shooting vaginal pains and occasional not regular tightening. Reports this is how she felt before PPROM last time around.  Also has not started usi

## 2020-07-27 ENCOUNTER — TELEPHONE (OUTPATIENT)
Dept: OBGYN CLINIC | Facility: CLINIC | Age: 30
End: 2020-07-27

## 2020-07-28 ENCOUNTER — HOSPITAL ENCOUNTER (OUTPATIENT)
Facility: HOSPITAL | Age: 30
Discharge: HOME | End: 2020-07-28
Attending: OBSTETRICS & GYNECOLOGY | Admitting: OBSTETRICS & GYNECOLOGY
Payer: COMMERCIAL

## 2020-07-28 ENCOUNTER — ROUTINE PRENATAL (OUTPATIENT)
Dept: OBGYN CLINIC | Facility: CLINIC | Age: 30
End: 2020-07-28
Payer: COMMERCIAL

## 2020-07-28 VITALS — BODY MASS INDEX: 28 KG/M2 | SYSTOLIC BLOOD PRESSURE: 112 MMHG | WEIGHT: 159 LBS | DIASTOLIC BLOOD PRESSURE: 66 MMHG

## 2020-07-28 DIAGNOSIS — Z87.59 HISTORY OF PRETERM PREMATURE RUPTURE OF MEMBRANES (PPROM): Primary | ICD-10-CM

## 2020-07-28 DIAGNOSIS — Z36.9 PRENATAL SCREENING ENCOUNTER: ICD-10-CM

## 2020-07-28 PROCEDURE — 3074F SYST BP LT 130 MM HG: CPT | Performed by: OBSTETRICS & GYNECOLOGY

## 2020-07-28 PROCEDURE — 96372 THER/PROPH/DIAG INJ SC/IM: CPT

## 2020-07-28 PROCEDURE — 87081 CULTURE SCREEN ONLY: CPT | Performed by: OBSTETRICS & GYNECOLOGY

## 2020-07-28 PROCEDURE — 81002 URINALYSIS NONAUTO W/O SCOPE: CPT | Performed by: OBSTETRICS & GYNECOLOGY

## 2020-07-28 PROCEDURE — 87653 STREP B DNA AMP PROBE: CPT | Performed by: OBSTETRICS & GYNECOLOGY

## 2020-07-28 PROCEDURE — 3078F DIAST BP <80 MM HG: CPT | Performed by: OBSTETRICS & GYNECOLOGY

## 2020-07-28 RX ORDER — BETAMETHASONE SODIUM PHOSPHATE AND BETAMETHASONE ACETATE 3; 3 MG/ML; MG/ML
12 INJECTION, SUSPENSION INTRA-ARTICULAR; INTRALESIONAL; INTRAMUSCULAR; SOFT TISSUE EVERY 24 HOURS
Status: DISCONTINUED | OUTPATIENT
Start: 2020-07-28 | End: 2020-07-28

## 2020-07-28 RX ORDER — BETAMETHASONE SODIUM PHOSPHATE AND BETAMETHASONE ACETATE 3; 3 MG/ML; MG/ML
INJECTION, SUSPENSION INTRA-ARTICULAR; INTRALESIONAL; INTRAMUSCULAR; SOFT TISSUE
Status: COMPLETED
Start: 2020-07-28 | End: 2020-07-28

## 2020-07-28 NOTE — TELEPHONE ENCOUNTER
Returned patient's phone call. Patient reports Ucx since 7 pm and reports increased pain with ambulation. She reports Ucx are not painful but increased pressure. She reports Ucx every 4-5 minutes. She denies LOF or VB. +FM.  Patient offered to be seen in

## 2020-07-28 NOTE — TELEPHONE ENCOUNTER
Spoke with patient. She is still having contractions. They are irregular, not consistent. She denies any LOF, VB. States that the baby is moving well. Per Dr. Pop Savage patient to be seen today. Added to schedule. PSR notified.

## 2020-07-28 NOTE — PROGRESS NOTES
POB 34w0d    Doing ok, here for problem visit due to irregular contractions q 3-5 min since yesterday, more regular when walking but not super painful +FM  More vaginal discharge but no LOF, VB    NST reactive, 3 contractions over 20 min  SVE: fingertip no

## 2020-07-28 NOTE — PATIENT INSTRUCTIONS
FETAL MOVEMENT CHART    Begin counting fetal movements at 32 weeks of pregnancy. You may find that your baby is more active after eating or drinking. We want you to time how long it takes to feel 10 movements (kicks, flutters, swishes or rolls).   Lyndsey Flores

## 2020-07-29 ENCOUNTER — HOSPITAL ENCOUNTER (OUTPATIENT)
Facility: HOSPITAL | Age: 30
Setting detail: OBSERVATION
Discharge: HOME | End: 2020-07-29
Attending: OBSTETRICS & GYNECOLOGY | Admitting: OBSTETRICS & GYNECOLOGY
Payer: COMMERCIAL

## 2020-07-29 VITALS
TEMPERATURE: 99 F | RESPIRATION RATE: 16 BRPM | DIASTOLIC BLOOD PRESSURE: 73 MMHG | WEIGHT: 156 LBS | SYSTOLIC BLOOD PRESSURE: 121 MMHG | HEART RATE: 93 BPM | BODY MASS INDEX: 27.64 KG/M2 | HEIGHT: 63 IN

## 2020-07-29 PROBLEM — Z34.90 PREGNANCY (HCC): Status: ACTIVE | Noted: 2020-07-29

## 2020-07-29 PROBLEM — Z34.90 PREGNANCY: Status: ACTIVE | Noted: 2020-07-29

## 2020-07-29 PROCEDURE — 59025 FETAL NON-STRESS TEST: CPT

## 2020-07-29 PROCEDURE — 96372 THER/PROPH/DIAG INJ SC/IM: CPT

## 2020-07-29 PROCEDURE — 99213 OFFICE O/P EST LOW 20 MIN: CPT

## 2020-07-29 RX ORDER — BETAMETHASONE SODIUM PHOSPHATE AND BETAMETHASONE ACETATE 3; 3 MG/ML; MG/ML
12 INJECTION, SUSPENSION INTRA-ARTICULAR; INTRALESIONAL; INTRAMUSCULAR; SOFT TISSUE ONCE
Status: COMPLETED | OUTPATIENT
Start: 2020-07-29 | End: 2020-07-29

## 2020-07-29 NOTE — NST
Nonstress Test   Patient: Christiano Sharma    Gestation: 34w1d    NST:       Variability: Moderate           Accelerations: Yes           Decelerations: None            Baseline: 130 BPM           Uterine Irritability: No           Contractions: Irregular

## 2020-07-29 NOTE — PROGRESS NOTES
Pt is a  at 34 1/7 wk iup who is brought to room triage-5 for her 2nd beta injection. Pt also reports increased uc's that she is worried about. EFM tested and applied. POC discussed and questions answered.  Pt reports +fm and denies any LOF, or vaginal

## 2020-08-04 ENCOUNTER — ROUTINE PRENATAL (OUTPATIENT)
Dept: OBGYN CLINIC | Facility: CLINIC | Age: 30
End: 2020-08-04
Payer: COMMERCIAL

## 2020-08-04 VITALS — DIASTOLIC BLOOD PRESSURE: 64 MMHG | WEIGHT: 156 LBS | SYSTOLIC BLOOD PRESSURE: 112 MMHG | BODY MASS INDEX: 28 KG/M2

## 2020-08-04 DIAGNOSIS — Z87.59 HISTORY OF PRETERM PREMATURE RUPTURE OF MEMBRANES (PPROM): ICD-10-CM

## 2020-08-04 DIAGNOSIS — Z36.9 PRENATAL SCREENING ENCOUNTER: Primary | ICD-10-CM

## 2020-08-04 DIAGNOSIS — O09.92 HIGH-RISK PREGNANCY IN SECOND TRIMESTER: ICD-10-CM

## 2020-08-04 PROCEDURE — 3078F DIAST BP <80 MM HG: CPT | Performed by: OBSTETRICS & GYNECOLOGY

## 2020-08-04 PROCEDURE — 3074F SYST BP LT 130 MM HG: CPT | Performed by: OBSTETRICS & GYNECOLOGY

## 2020-08-04 PROCEDURE — 1111F DSCHRG MED/CURRENT MED MERGE: CPT | Performed by: OBSTETRICS & GYNECOLOGY

## 2020-08-04 PROCEDURE — 81002 URINALYSIS NONAUTO W/O SCOPE: CPT | Performed by: OBSTETRICS & GYNECOLOGY

## 2020-08-04 NOTE — PROGRESS NOTES
No C/O, good FM  Still with some contractions  Betamethasone completed, GBS neg  Lost 3 lbs, discussed, reassured  1 week

## 2020-08-07 NOTE — MR AVS SNAPSHOT
[address], [facility] [address] [place] [address] [phone]               Thank you for choosing us for your health care visit with Lima Hampton MD.  We are glad to serve you and happy to provide you with this sum You can access your MyChart to more actively manage your health care and view more details from this visit by going to [email or web address].   If you've recently had a stay at the Hospital you can access your discharge instructions in [address] by linda Statement Selected

## 2020-08-11 ENCOUNTER — ROUTINE PRENATAL (OUTPATIENT)
Dept: OBGYN CLINIC | Facility: CLINIC | Age: 30
End: 2020-08-11
Payer: COMMERCIAL

## 2020-08-11 VITALS — DIASTOLIC BLOOD PRESSURE: 66 MMHG | SYSTOLIC BLOOD PRESSURE: 112 MMHG | BODY MASS INDEX: 28 KG/M2 | WEIGHT: 160 LBS

## 2020-08-11 DIAGNOSIS — Z34.93 ENCOUNTER FOR SUPERVISION OF NORMAL PREGNANCY IN THIRD TRIMESTER, UNSPECIFIED GRAVIDITY: Primary | ICD-10-CM

## 2020-08-11 DIAGNOSIS — Z87.59 HISTORY OF PRETERM PREMATURE RUPTURE OF MEMBRANES (PPROM): ICD-10-CM

## 2020-08-11 DIAGNOSIS — Z36.9 PRENATAL SCREENING ENCOUNTER: ICD-10-CM

## 2020-08-11 DIAGNOSIS — O09.93 HIGH-RISK PREGNANCY IN THIRD TRIMESTER: ICD-10-CM

## 2020-08-11 LAB
GLUCOSE (URINE DIPSTICK): NEGATIVE MG/DL
MULTISTIX LOT#: NORMAL NUMERIC

## 2020-08-11 PROCEDURE — 3078F DIAST BP <80 MM HG: CPT | Performed by: OBSTETRICS & GYNECOLOGY

## 2020-08-11 PROCEDURE — 81002 URINALYSIS NONAUTO W/O SCOPE: CPT | Performed by: OBSTETRICS & GYNECOLOGY

## 2020-08-11 PROCEDURE — 3074F SYST BP LT 130 MM HG: CPT | Performed by: OBSTETRICS & GYNECOLOGY

## 2020-08-11 NOTE — PROGRESS NOTES
BRIGIDO    GA: 36w0d   20  1101   BP: 112/66   Weight: 160 lb (72.6 kg)       Doing well, +FM  Denies LOF/VB/uctx  Mode of delivery:  anticipated  SVE /-2   GBS negative, will need repeat at 38 weeks due to early collection   Fetal movement

## 2020-08-11 NOTE — PATIENT INSTRUCTIONS
Labor Instructions    How do I know if it’s true labor? • One of the most important aspects of any pregnancy is being able to recognize the onset of labor.   Unfortunately, on occasion it can be difficult or confusing, especially if you have had one or mor of the contractions. Having regular (usually closer), longer lasting (35-70 seconds), and sharper (more painful) contractions are the common symptoms of actual labor.   The second way in which labor can begin which occurs in approximately 30% of all patien the room during your labor. During the delivery, the nurses will inform you of the hospital policy and how many coaches are allowed. You may desire pain medication or anesthesia for pain.   You probably discussed some aspects of pain medication with us dur Please call the office within a few days after you are discharged from the hospital to schedule your post-partum visit, which is usually 4-6 weeks after delivery. Any medications necessary will be discussed on an individual basis.   If you decide to fred Time M T W Th F S S                                                                                                           Time M T W Th F S S

## 2020-08-18 ENCOUNTER — ROUTINE PRENATAL (OUTPATIENT)
Dept: OBGYN CLINIC | Facility: CLINIC | Age: 30
End: 2020-08-18
Payer: COMMERCIAL

## 2020-08-18 ENCOUNTER — ULTRASOUND ENCOUNTER (OUTPATIENT)
Dept: OBGYN CLINIC | Facility: CLINIC | Age: 30
End: 2020-08-18
Payer: COMMERCIAL

## 2020-08-18 VITALS
HEART RATE: 82 BPM | WEIGHT: 162.38 LBS | DIASTOLIC BLOOD PRESSURE: 70 MMHG | BODY MASS INDEX: 29 KG/M2 | SYSTOLIC BLOOD PRESSURE: 116 MMHG

## 2020-08-18 DIAGNOSIS — Z87.59 HISTORY OF PRETERM PREMATURE RUPTURE OF MEMBRANES (PPROM): ICD-10-CM

## 2020-08-18 DIAGNOSIS — Z36.9 PRENATAL SCREENING ENCOUNTER: Primary | ICD-10-CM

## 2020-08-18 DIAGNOSIS — O26.843 UTERINE SIZE-DATE DISCREPANCY IN THIRD TRIMESTER: ICD-10-CM

## 2020-08-18 DIAGNOSIS — Z34.83 ENCOUNTER FOR SUPERVISION OF OTHER NORMAL PREGNANCY IN THIRD TRIMESTER: ICD-10-CM

## 2020-08-18 PROCEDURE — 3078F DIAST BP <80 MM HG: CPT | Performed by: OBSTETRICS & GYNECOLOGY

## 2020-08-18 PROCEDURE — 81002 URINALYSIS NONAUTO W/O SCOPE: CPT | Performed by: OBSTETRICS & GYNECOLOGY

## 2020-08-18 PROCEDURE — 3074F SYST BP LT 130 MM HG: CPT | Performed by: OBSTETRICS & GYNECOLOGY

## 2020-08-18 PROCEDURE — 76816 OB US FOLLOW-UP PER FETUS: CPT | Performed by: OBSTETRICS & GYNECOLOGY

## 2020-08-18 NOTE — PROGRESS NOTES
BRIGIDO - 37w0d    Doing well, +FM  Denies LOF/VB/uctx  /70   Pulse 82   Wt 162 lb 6.4 oz (73.7 kg)   LMP 12/03/2019 (Exact Date)   BMI 28.77 kg/m²    GBS neg, needs repeat at 38 weeks if still pregnant. Growth US at 22%, AC lagging at 4 %.    BPP 8/8

## 2020-08-25 ENCOUNTER — ROUTINE PRENATAL (OUTPATIENT)
Dept: OBGYN CLINIC | Facility: CLINIC | Age: 30
End: 2020-08-25
Payer: COMMERCIAL

## 2020-08-25 ENCOUNTER — TELEPHONE (OUTPATIENT)
Dept: OBGYN CLINIC | Facility: CLINIC | Age: 30
End: 2020-08-25

## 2020-08-25 VITALS — BODY MASS INDEX: 29 KG/M2 | WEIGHT: 163 LBS | DIASTOLIC BLOOD PRESSURE: 70 MMHG | SYSTOLIC BLOOD PRESSURE: 116 MMHG

## 2020-08-25 DIAGNOSIS — Z36.9 PRENATAL SCREENING ENCOUNTER: Primary | ICD-10-CM

## 2020-08-25 DIAGNOSIS — Z34.83 ENCOUNTER FOR SUPERVISION OF OTHER NORMAL PREGNANCY IN THIRD TRIMESTER: ICD-10-CM

## 2020-08-25 LAB
GLUCOSE (URINE DIPSTICK): NEGATIVE MG/DL
MULTISTIX LOT#: NORMAL NUMERIC

## 2020-08-25 PROCEDURE — 87081 CULTURE SCREEN ONLY: CPT | Performed by: OBSTETRICS & GYNECOLOGY

## 2020-08-25 PROCEDURE — 87653 STREP B DNA AMP PROBE: CPT | Performed by: OBSTETRICS & GYNECOLOGY

## 2020-08-25 PROCEDURE — 3078F DIAST BP <80 MM HG: CPT | Performed by: OBSTETRICS & GYNECOLOGY

## 2020-08-25 PROCEDURE — 3074F SYST BP LT 130 MM HG: CPT | Performed by: OBSTETRICS & GYNECOLOGY

## 2020-08-25 PROCEDURE — 81002 URINALYSIS NONAUTO W/O SCOPE: CPT | Performed by: OBSTETRICS & GYNECOLOGY

## 2020-08-27 NOTE — TELEPHONE ENCOUNTER
[facility] calling for a Prior Auth needed for her Patsy    Pt has [address] will call for new INS    Call Fabio at [phone] Refill request:    DULOXETINE DR 30 MG CAPSULES    DULoxetine (CYMBALTA) 30 MG capsule 90 mg, DAILY 3 ordered         Summary: Take 3 capsules (90 mg) by mouth daily, Disp-270 capsule,R-3, E-Prescribe   Dose, Route, Frequency: 90 mg, Oral, DAILY  Start: 8/17/2020  Ord/Sold: 8/17/2020

## 2020-09-01 ENCOUNTER — ROUTINE PRENATAL (OUTPATIENT)
Dept: OBGYN CLINIC | Facility: CLINIC | Age: 30
End: 2020-09-01
Payer: COMMERCIAL

## 2020-09-01 VITALS
DIASTOLIC BLOOD PRESSURE: 68 MMHG | HEART RATE: 84 BPM | SYSTOLIC BLOOD PRESSURE: 116 MMHG | WEIGHT: 164.81 LBS | BODY MASS INDEX: 29 KG/M2

## 2020-09-01 DIAGNOSIS — Z34.83 ENCOUNTER FOR SUPERVISION OF OTHER NORMAL PREGNANCY IN THIRD TRIMESTER: ICD-10-CM

## 2020-09-01 DIAGNOSIS — Z36.9 PRENATAL SCREENING ENCOUNTER: Primary | ICD-10-CM

## 2020-09-01 LAB
GLUCOSE (URINE DIPSTICK): NEGATIVE MG/DL
MULTISTIX LOT#: NORMAL NUMERIC

## 2020-09-01 PROCEDURE — 3074F SYST BP LT 130 MM HG: CPT | Performed by: OBSTETRICS & GYNECOLOGY

## 2020-09-01 PROCEDURE — 3078F DIAST BP <80 MM HG: CPT | Performed by: OBSTETRICS & GYNECOLOGY

## 2020-09-01 PROCEDURE — 81002 URINALYSIS NONAUTO W/O SCOPE: CPT | Performed by: OBSTETRICS & GYNECOLOGY

## 2020-09-08 ENCOUNTER — HOSPITAL ENCOUNTER (INPATIENT)
Facility: HOSPITAL | Age: 30
LOS: 2 days | Discharge: HOME | End: 2020-09-10
Attending: OBSTETRICS & GYNECOLOGY | Admitting: OBSTETRICS & GYNECOLOGY
Payer: COMMERCIAL

## 2020-09-08 ENCOUNTER — ROUTINE PRENATAL (OUTPATIENT)
Dept: OBGYN CLINIC | Facility: CLINIC | Age: 30
End: 2020-09-08
Payer: COMMERCIAL

## 2020-09-08 VITALS — SYSTOLIC BLOOD PRESSURE: 112 MMHG | BODY MASS INDEX: 29 KG/M2 | WEIGHT: 166 LBS | DIASTOLIC BLOOD PRESSURE: 68 MMHG

## 2020-09-08 DIAGNOSIS — O48.0 POST-TERM PREGNANCY, 40-42 WEEKS OF GESTATION: ICD-10-CM

## 2020-09-08 DIAGNOSIS — Z36.9 PRENATAL SCREENING ENCOUNTER: Primary | ICD-10-CM

## 2020-09-08 PROBLEM — Z34.90 PREGNANCY: Status: ACTIVE | Noted: 2020-09-08

## 2020-09-08 PROBLEM — Z34.90 PREGNANCY (HCC): Status: ACTIVE | Noted: 2020-09-08

## 2020-09-08 PROBLEM — Z34.93 ENCOUNTER FOR SUPERVISION OF NORMAL PREGNANCY IN THIRD TRIMESTER (HCC): Status: RESOLVED | Noted: 2020-07-29 | Resolved: 2020-09-08

## 2020-09-08 PROBLEM — Z34.93 ENCOUNTER FOR SUPERVISION OF NORMAL PREGNANCY IN THIRD TRIMESTER: Status: RESOLVED | Noted: 2020-07-29 | Resolved: 2020-09-08

## 2020-09-08 PROBLEM — O09.93 HIGH-RISK PREGNANCY IN THIRD TRIMESTER (HCC): Status: RESOLVED | Noted: 2020-03-20 | Resolved: 2020-09-08

## 2020-09-08 PROBLEM — Z34.90 PREGNANCY (HCC): Status: RESOLVED | Noted: 2020-09-08 | Resolved: 2020-09-08

## 2020-09-08 PROBLEM — Z34.90 PREGNANCY: Status: RESOLVED | Noted: 2020-09-08 | Resolved: 2020-09-08

## 2020-09-08 PROBLEM — O09.93 HIGH-RISK PREGNANCY IN THIRD TRIMESTER: Status: RESOLVED | Noted: 2020-03-20 | Resolved: 2020-09-08

## 2020-09-08 LAB
ANTIBODY SCREEN: NEGATIVE
BASOPHILS # BLD AUTO: 0.03 X10(3) UL (ref 0–0.2)
BASOPHILS NFR BLD AUTO: 0.4 %
DEPRECATED RDW RBC AUTO: 41.5 FL (ref 35.1–46.3)
EOSINOPHIL # BLD AUTO: 0.05 X10(3) UL (ref 0–0.7)
EOSINOPHIL NFR BLD AUTO: 0.7 %
ERYTHROCYTE [DISTWIDTH] IN BLOOD BY AUTOMATED COUNT: 12.4 % (ref 11–15)
GLUCOSE (URINE DIPSTICK): NEGATIVE MG/DL
HCT VFR BLD AUTO: 30.8 % (ref 35–48)
HGB BLD-MCNC: 10.8 G/DL (ref 12–16)
IMM GRANULOCYTES # BLD AUTO: 0.07 X10(3) UL (ref 0–1)
IMM GRANULOCYTES NFR BLD: 0.9 %
LYMPHOCYTES # BLD AUTO: 1.67 X10(3) UL (ref 1–4)
LYMPHOCYTES NFR BLD AUTO: 22.4 %
MCH RBC QN AUTO: 32.2 PG (ref 26–34)
MCHC RBC AUTO-ENTMCNC: 35.1 G/DL (ref 31–37)
MCV RBC AUTO: 91.9 FL (ref 80–100)
MONOCYTES # BLD AUTO: 0.55 X10(3) UL (ref 0.1–1)
MONOCYTES NFR BLD AUTO: 7.4 %
MULTISTIX LOT#: NORMAL NUMERIC
NEUTROPHILS # BLD AUTO: 5.09 X10 (3) UL (ref 1.5–7.7)
NEUTROPHILS # BLD AUTO: 5.09 X10(3) UL (ref 1.5–7.7)
NEUTROPHILS NFR BLD AUTO: 68.2 %
PLATELET # BLD AUTO: 105 10(3)UL (ref 150–450)
RBC # BLD AUTO: 3.35 X10(6)UL (ref 3.8–5.3)
RH BLOOD TYPE: POSITIVE
SARS-COV-2 RNA RESP QL NAA+PROBE: NOT DETECTED
T PALLIDUM AB SER QL IA: NONREACTIVE
WBC # BLD AUTO: 7.5 X10(3) UL (ref 4–11)

## 2020-09-08 PROCEDURE — 59025 FETAL NON-STRESS TEST: CPT | Performed by: OBSTETRICS & GYNECOLOGY

## 2020-09-08 PROCEDURE — 81002 URINALYSIS NONAUTO W/O SCOPE: CPT | Performed by: OBSTETRICS & GYNECOLOGY

## 2020-09-08 PROCEDURE — 59410 OBSTETRICAL CARE: CPT | Performed by: OBSTETRICS & GYNECOLOGY

## 2020-09-08 PROCEDURE — 3074F SYST BP LT 130 MM HG: CPT | Performed by: OBSTETRICS & GYNECOLOGY

## 2020-09-08 PROCEDURE — 0HQ9XZZ REPAIR PERINEUM SKIN, EXTERNAL APPROACH: ICD-10-PCS | Performed by: OBSTETRICS & GYNECOLOGY

## 2020-09-08 PROCEDURE — 3078F DIAST BP <80 MM HG: CPT | Performed by: OBSTETRICS & GYNECOLOGY

## 2020-09-08 RX ORDER — DEXTROSE, SODIUM CHLORIDE, SODIUM LACTATE, POTASSIUM CHLORIDE, AND CALCIUM CHLORIDE 5; .6; .31; .03; .02 G/100ML; G/100ML; G/100ML; G/100ML; G/100ML
INJECTION, SOLUTION INTRAVENOUS AS NEEDED
Status: DISCONTINUED | OUTPATIENT
Start: 2020-09-08 | End: 2020-09-08 | Stop reason: HOSPADM

## 2020-09-08 RX ORDER — ACETAMINOPHEN 325 MG/1
650 TABLET ORAL EVERY 6 HOURS PRN
Status: DISCONTINUED | OUTPATIENT
Start: 2020-09-08 | End: 2020-09-10

## 2020-09-08 RX ORDER — DOCUSATE SODIUM 100 MG/1
100 CAPSULE, LIQUID FILLED ORAL
Status: DISCONTINUED | OUTPATIENT
Start: 2020-09-08 | End: 2020-09-10

## 2020-09-08 RX ORDER — ACETAMINOPHEN 500 MG
500 TABLET ORAL EVERY 6 HOURS PRN
Status: DISCONTINUED | OUTPATIENT
Start: 2020-09-08 | End: 2020-09-08

## 2020-09-08 RX ORDER — IBUPROFEN 600 MG/1
600 TABLET ORAL EVERY 6 HOURS
Status: DISCONTINUED | OUTPATIENT
Start: 2020-09-08 | End: 2020-09-10

## 2020-09-08 RX ORDER — SIMETHICONE 80 MG
80 TABLET,CHEWABLE ORAL 3 TIMES DAILY PRN
Status: DISCONTINUED | OUTPATIENT
Start: 2020-09-08 | End: 2020-09-10

## 2020-09-08 RX ORDER — BISACODYL 10 MG
10 SUPPOSITORY, RECTAL RECTAL ONCE AS NEEDED
Status: DISCONTINUED | OUTPATIENT
Start: 2020-09-08 | End: 2020-09-10

## 2020-09-08 RX ORDER — TRISODIUM CITRATE DIHYDRATE AND CITRIC ACID MONOHYDRATE 500; 334 MG/5ML; MG/5ML
30 SOLUTION ORAL AS NEEDED
Status: DISCONTINUED | OUTPATIENT
Start: 2020-09-08 | End: 2020-09-08 | Stop reason: HOSPADM

## 2020-09-08 RX ORDER — SODIUM CHLORIDE, SODIUM LACTATE, POTASSIUM CHLORIDE, CALCIUM CHLORIDE 600; 310; 30; 20 MG/100ML; MG/100ML; MG/100ML; MG/100ML
INJECTION, SOLUTION INTRAVENOUS CONTINUOUS
Status: DISCONTINUED | OUTPATIENT
Start: 2020-09-08 | End: 2020-09-08 | Stop reason: HOSPADM

## 2020-09-08 RX ORDER — TERBUTALINE SULFATE 1 MG/ML
0.25 INJECTION, SOLUTION SUBCUTANEOUS AS NEEDED
Status: DISCONTINUED | OUTPATIENT
Start: 2020-09-08 | End: 2020-09-08 | Stop reason: HOSPADM

## 2020-09-08 RX ORDER — IBUPROFEN 600 MG/1
600 TABLET ORAL EVERY 6 HOURS PRN
Status: DISCONTINUED | OUTPATIENT
Start: 2020-09-08 | End: 2020-09-08 | Stop reason: HOSPADM

## 2020-09-08 RX ORDER — AMMONIA INHALANTS 0.04 G/.3ML
0.3 INHALANT RESPIRATORY (INHALATION) AS NEEDED
Status: DISCONTINUED | OUTPATIENT
Start: 2020-09-08 | End: 2020-09-08 | Stop reason: HOSPADM

## 2020-09-08 RX ORDER — ONDANSETRON 2 MG/ML
4 INJECTION INTRAMUSCULAR; INTRAVENOUS EVERY 6 HOURS PRN
Status: DISCONTINUED | OUTPATIENT
Start: 2020-09-08 | End: 2020-09-08 | Stop reason: HOSPADM

## 2020-09-08 NOTE — PROGRESS NOTES
BRIGIDO 40w0d    Doing well, +FM  Intermittent not regular contractions  No LOF, VB  SVE: 2/60/-2 but head easy ballotable    1. FHT-reactive NST  2. PNL:  GBS negative  3. Mode of delivery: We reviewed typical post date management.  She does not want to schedu

## 2020-09-08 NOTE — PATIENT INSTRUCTIONS
FETAL MOVEMENT CHART    Begin counting fetal movements at 32 weeks of pregnancy. You may find that your baby is more active after eating or drinking. We want you to time how long it takes to feel 10 movements (kicks, flutters, swishes or rolls).   Luis Alfredo Wiseman

## 2020-09-09 LAB
BASOPHILS # BLD AUTO: 0.04 X10(3) UL (ref 0–0.2)
BASOPHILS NFR BLD AUTO: 0.4 %
DEPRECATED RDW RBC AUTO: 43 FL (ref 35.1–46.3)
EOSINOPHIL # BLD AUTO: 0.03 X10(3) UL (ref 0–0.7)
EOSINOPHIL NFR BLD AUTO: 0.3 %
ERYTHROCYTE [DISTWIDTH] IN BLOOD BY AUTOMATED COUNT: 12.4 % (ref 11–15)
HCT VFR BLD AUTO: 28.3 % (ref 35–48)
HGB BLD-MCNC: 9.6 G/DL (ref 12–16)
IMM GRANULOCYTES # BLD AUTO: 0.07 X10(3) UL (ref 0–1)
IMM GRANULOCYTES NFR BLD: 0.7 %
LYMPHOCYTES # BLD AUTO: 1.72 X10(3) UL (ref 1–4)
LYMPHOCYTES NFR BLD AUTO: 16.5 %
MCH RBC QN AUTO: 32.2 PG (ref 26–34)
MCHC RBC AUTO-ENTMCNC: 33.9 G/DL (ref 31–37)
MCV RBC AUTO: 95 FL (ref 80–100)
MONOCYTES # BLD AUTO: 0.8 X10(3) UL (ref 0.1–1)
MONOCYTES NFR BLD AUTO: 7.7 %
NEUTROPHILS # BLD AUTO: 7.74 X10 (3) UL (ref 1.5–7.7)
NEUTROPHILS # BLD AUTO: 7.74 X10(3) UL (ref 1.5–7.7)
NEUTROPHILS NFR BLD AUTO: 74.4 %
PLATELET # BLD AUTO: 90 10(3)UL (ref 150–450)
RBC # BLD AUTO: 2.98 X10(6)UL (ref 3.8–5.3)
WBC # BLD AUTO: 10.4 X10(3) UL (ref 4–11)

## 2020-09-09 NOTE — H&P
[facility]  Obstetrics and Gynecology  History & Physical    Blairmason Klein Patient Status:  Inpatient    1990 MRN [id number]   Location [address] [facility] Attending hSakila Campos 15 Day 0 PCP Pamela Eric MD Problem Relation Age of Onset   • Diabetes Father    • Hypertension Father         A-Fib as well   • Ear Problems Father    • Heart Disorder Father    • Hypertension Mother    • Cancer Mother 59        breast cancer   • Clotting Disorder Mother    • Hear admit-->progressed to complete approximately 30 minutes later    Le[place]:  cephalic, EFW 7 lbs 0 oz    Prenatal Labs Brief Review   Blood Type:   Lab Results   Component Value Date    ABO A 02/21/2020    RH Positive 02/21/2020     GBS:  Negative      Inpa

## 2020-09-09 NOTE — PROGRESS NOTES
Pt is a 26year old female admitted to 106/-A. Patient presents with:  Srom  Laboring     Pt is  40w0d intra-uterine pregnancy. History obtained, consents signed. Oriented to room, staff, and plan of care.

## 2020-09-09 NOTE — PROGRESS NOTES
[facility]  Obstetrics and Gynecology    OB/GYN: Postpartum Progress Note     SUBJECTIVE:  Patient is a 26year old  female who is s/p . She is PPD# 1. Doing well. Denies fever, chills, N, V, chest pain and SOB.  Bleeding has been sta

## 2020-09-09 NOTE — PROGRESS NOTES
Patient admitted to second floor Mother/Baby. Oriented to room, plan of care reviewed. ID bands verified. Postpartum teaching initiated.

## 2020-09-09 NOTE — PROGRESS NOTES
Patient up to bathroom with assist x 1. Voided 200ml at this time. Patient transferred to mother/baby room 2217 per wheelchair in stable condition with baby and personal belongings. Accompanied by significant other and staff.   Report given to mother/baby

## 2020-09-09 NOTE — L&D DELIVERY NOTE
Maame Kumar [[id number]]    Labor Events     labor?:  No   steroids?:  None  Antibiotics received during labor?:  No  Antibiotics (enter # doses in comment):  none  Rupture date/time:  2020 1830     Rupture type:  SROM  Fluid color:  John cry; hypoventilation Good, crying              1 Minute:   5 Minute:   10 Minute:   15 Minute:   20 Minute:     Skin color: 1  1       Heart rate: 2  2       Reflex irritablity: 2  2       Muscle tone: 2  2       Respiratory effort: 2        Total: 9 abdomen for bonding. The cord blood was sampled. IV pitocin and gentle uterine massage helped delivery of the placenta intact with 3 vessels. Umbilical cord gases were not sent.      Her MARCOS was noted to be intermittent atonic, responded to fundal massage

## 2020-09-10 VITALS
SYSTOLIC BLOOD PRESSURE: 96 MMHG | WEIGHT: 163 LBS | DIASTOLIC BLOOD PRESSURE: 49 MMHG | TEMPERATURE: 97 F | RESPIRATION RATE: 17 BRPM | BODY MASS INDEX: 28.88 KG/M2 | HEIGHT: 63 IN | HEART RATE: 62 BPM

## 2020-09-10 RX ORDER — PSEUDOEPHEDRINE HCL 30 MG
100 TABLET ORAL 2 TIMES DAILY PRN
Qty: 30 CAPSULE | Refills: 0 | Status: SHIPPED | OUTPATIENT
Start: 2020-09-10 | End: 2020-10-08

## 2020-09-10 RX ORDER — IBUPROFEN 600 MG/1
600 TABLET ORAL EVERY 6 HOURS PRN
Qty: 30 TABLET | Refills: 0 | Status: SHIPPED | OUTPATIENT
Start: 2020-09-10 | End: 2020-10-20

## 2020-09-10 NOTE — PLAN OF CARE
Problem: ANXIETY  Goal: Will report anxiety at manageable levels  Description  INTERVENTIONS:  - Administer medication as ordered  - Teach and rehearse alternative coping skills  - Provide emotional support with 1:1 interaction with staff  Outcome: Compl and actively listen. - Educate father/SO about benefits of breast feeding and how to manage common lactation challenges.   - Recommend avoidance of specific medications or substances incompatible with breast feeding.  - Assess and monitor for signs of nipp

## 2020-09-10 NOTE — PROGRESS NOTES
[address] [facility]  Obstetrics and Gynecology    OB/GYN: Postpartum Progress Note     SUBJECTIVE:  Patient is a 26year old  female who is s/p . She is PPD# 1. Doing well. Denies fever, chills, N, V, chest pain and SOB.  Bleeding has been sta

## 2020-09-10 NOTE — DISCHARGE SUMMARY
[facility]  Discharge Summary    Christiano Query Patient Status:  inpatient    1990 MRN [id number]   Location 6484/6097-Z Attending [facility] Meño Cowart Day # 2 PCP Rossy Kaba MD     Date of Admission: 2020    Date of Discharge: 09/10/20

## 2020-09-10 NOTE — PROGRESS NOTES
Patient discharged in stable condition with . Discharge paperwork reviewed and patient verbalized understanding. All questions answered and bands were checked. Patient states she feels confident in caring for self and  at home.  Patient will f

## 2020-09-12 ENCOUNTER — TELEPHONE (OUTPATIENT)
Dept: OBGYN UNIT | Facility: HOSPITAL | Age: 30
End: 2020-09-12

## 2020-09-19 ENCOUNTER — TELEPHONE (OUTPATIENT)
Dept: OBGYN CLINIC | Facility: CLINIC | Age: 30
End: 2020-09-19

## 2020-09-19 RX ORDER — METHYLERGONOVINE MALEATE 0.2 MG/1
0.2 TABLET ORAL 3 TIMES DAILY
Qty: 6 TABLET | Refills: 0 | Status: SHIPPED | OUTPATIENT
Start: 2020-09-19 | End: 2020-09-21

## 2020-09-19 NOTE — TELEPHONE ENCOUNTER
Patient is 11 days pp. She has noticed in the past 2 days that the bleeding has increased to changing a pad every 2 to 3 hours. She was more active on Wednesday. Patient also expressed that she is feeling discomfort and does not feel good.

## 2020-09-19 NOTE — TELEPHONE ENCOUNTER
Patient is s/p  on 2020. She reports bleeding has increase for 1.5 days. She reports bleeding is consistent with use of medium to large pad changed every 2-3 hours. She denies passage of large blood clots.  She denies fever, chills, chest pain and

## 2020-09-21 ENCOUNTER — POSTPARTUM (OUTPATIENT)
Dept: OBGYN CLINIC | Facility: CLINIC | Age: 30
End: 2020-09-21
Payer: COMMERCIAL

## 2020-09-21 VITALS
SYSTOLIC BLOOD PRESSURE: 116 MMHG | BODY MASS INDEX: 26.22 KG/M2 | WEIGHT: 148 LBS | DIASTOLIC BLOOD PRESSURE: 70 MMHG | HEIGHT: 63 IN

## 2020-09-21 DIAGNOSIS — Z23 NEED FOR VACCINATION: ICD-10-CM

## 2020-09-21 LAB
CUVETTE LOT #: ABNORMAL NUMERIC
HEMOGLOBIN: 10.5 G/DL (ref 12–15)

## 2020-09-21 PROCEDURE — 3078F DIAST BP <80 MM HG: CPT | Performed by: OBSTETRICS & GYNECOLOGY

## 2020-09-21 PROCEDURE — 1111F DSCHRG MED/CURRENT MED MERGE: CPT | Performed by: OBSTETRICS & GYNECOLOGY

## 2020-09-21 PROCEDURE — 90471 IMMUNIZATION ADMIN: CPT | Performed by: OBSTETRICS & GYNECOLOGY

## 2020-09-21 PROCEDURE — 85018 HEMOGLOBIN: CPT | Performed by: OBSTETRICS & GYNECOLOGY

## 2020-09-21 PROCEDURE — 90686 IIV4 VACC NO PRSV 0.5 ML IM: CPT | Performed by: OBSTETRICS & GYNECOLOGY

## 2020-09-21 PROCEDURE — 3008F BODY MASS INDEX DOCD: CPT | Performed by: OBSTETRICS & GYNECOLOGY

## 2020-09-21 PROCEDURE — 3074F SYST BP LT 130 MM HG: CPT | Performed by: OBSTETRICS & GYNECOLOGY

## 2020-09-21 PROCEDURE — 99213 OFFICE O/P EST LOW 20 MIN: CPT | Performed by: OBSTETRICS & GYNECOLOGY

## 2020-09-21 NOTE — PROGRESS NOTES
Rapid delivery 9/8. Was in hospital one hour prior to delivery.   Level III did delivery  Started bleeding last week, passed egg size clot  Methergine for 24 hours    Bleeding better yesterday, minimal bleeding now    ROS: No Cardiac, Respiratory, GI,  o

## 2020-10-08 ENCOUNTER — POSTPARTUM (OUTPATIENT)
Dept: OBGYN CLINIC | Facility: CLINIC | Age: 30
End: 2020-10-08
Payer: COMMERCIAL

## 2020-10-08 VITALS
SYSTOLIC BLOOD PRESSURE: 116 MMHG | DIASTOLIC BLOOD PRESSURE: 68 MMHG | HEART RATE: 97 BPM | BODY MASS INDEX: 26 KG/M2 | WEIGHT: 144 LBS

## 2020-10-08 DIAGNOSIS — Z12.4 SCREENING FOR MALIGNANT NEOPLASM OF CERVIX: ICD-10-CM

## 2020-10-08 PROCEDURE — 88175 CYTOPATH C/V AUTO FLUID REDO: CPT | Performed by: OBSTETRICS & GYNECOLOGY

## 2020-10-08 PROCEDURE — 3074F SYST BP LT 130 MM HG: CPT | Performed by: OBSTETRICS & GYNECOLOGY

## 2020-10-08 PROCEDURE — 87624 HPV HI-RISK TYP POOLED RSLT: CPT | Performed by: OBSTETRICS & GYNECOLOGY

## 2020-10-08 PROCEDURE — 3078F DIAST BP <80 MM HG: CPT | Performed by: OBSTETRICS & GYNECOLOGY

## 2020-10-08 NOTE — PROGRESS NOTES
POSTPARTUM VISIT    S: patient is a 26year old  who presents today for post partum visit. She underwent precipitous  on 20, level 3 delivered, I arrived as baby was delivering.   She was seen at about 2 weeks PP due to increased bleeding, Rx lesions                     Adnexa: non tender, no masses, normal size              A/P:  -Postpartum depression screen 3  -Contraception: reviewed options, will use condoms  -We reviewed bleeding precautions extensively  -Monitor LN for now, I suspect carolyn

## 2020-10-20 ENCOUNTER — OFFICE VISIT (OUTPATIENT)
Dept: INTERNAL MEDICINE CLINIC | Facility: CLINIC | Age: 30
End: 2020-10-20
Payer: COMMERCIAL

## 2020-10-20 VITALS
TEMPERATURE: 98 F | HEIGHT: 63 IN | RESPIRATION RATE: 14 BRPM | OXYGEN SATURATION: 98 % | SYSTOLIC BLOOD PRESSURE: 96 MMHG | BODY MASS INDEX: 25.87 KG/M2 | DIASTOLIC BLOOD PRESSURE: 50 MMHG | HEART RATE: 94 BPM | WEIGHT: 146 LBS

## 2020-10-20 DIAGNOSIS — E55.9 HYPOVITAMINOSIS D: ICD-10-CM

## 2020-10-20 DIAGNOSIS — M79.671 RIGHT FOOT PAIN: Primary | ICD-10-CM

## 2020-10-20 DIAGNOSIS — E53.8 LOW VITAMIN B12 LEVEL: ICD-10-CM

## 2020-10-20 DIAGNOSIS — K59.00 CONSTIPATION, UNSPECIFIED CONSTIPATION TYPE: ICD-10-CM

## 2020-10-20 DIAGNOSIS — D64.9 ANEMIA, UNSPECIFIED TYPE: ICD-10-CM

## 2020-10-20 PROCEDURE — 3078F DIAST BP <80 MM HG: CPT | Performed by: INTERNAL MEDICINE

## 2020-10-20 PROCEDURE — 82607 VITAMIN B-12: CPT | Performed by: INTERNAL MEDICINE

## 2020-10-20 PROCEDURE — 3008F BODY MASS INDEX DOCD: CPT | Performed by: INTERNAL MEDICINE

## 2020-10-20 PROCEDURE — 82306 VITAMIN D 25 HYDROXY: CPT | Performed by: INTERNAL MEDICINE

## 2020-10-20 PROCEDURE — 3074F SYST BP LT 130 MM HG: CPT | Performed by: INTERNAL MEDICINE

## 2020-10-20 PROCEDURE — 99213 OFFICE O/P EST LOW 20 MIN: CPT | Performed by: INTERNAL MEDICINE

## 2020-10-20 PROCEDURE — 36415 COLL VENOUS BLD VENIPUNCTURE: CPT | Performed by: INTERNAL MEDICINE

## 2020-10-20 PROCEDURE — 83540 ASSAY OF IRON: CPT | Performed by: INTERNAL MEDICINE

## 2020-10-20 PROCEDURE — 83550 IRON BINDING TEST: CPT | Performed by: INTERNAL MEDICINE

## 2020-10-20 PROCEDURE — 85025 COMPLETE CBC W/AUTO DIFF WBC: CPT | Performed by: INTERNAL MEDICINE

## 2020-10-20 PROCEDURE — 82728 ASSAY OF FERRITIN: CPT | Performed by: INTERNAL MEDICINE

## 2020-10-20 NOTE — PATIENT INSTRUCTIONS
7 day course of ibuprofen 600mg two or three times a day for anti-inflammatory effect     Consider physical therapy if symptoms persist     miralax as needed for constipation - start at 1/2-1 scoop daily

## 2020-10-20 NOTE — PROGRESS NOTES
[facility] Internal Medicine Office Note  Chief Complaint:   Patient presents with:   Foot Pain: riht heel       HPI:   This is a 26year old female coming in for heel pain  HPI  Right heel pain  Pain is positional  No pain with walking  She notes • Docusate Sodium (COLACE OR) Take by mouth. • prenatal multivitamin plus DHA 27-0.8-228 MG Oral Cap Take 1 capsule by mouth daily. • IRON OR      • Vitamin B-12 1000 MCG Oral Tab Take 1,000 mcg by mouth daily.      • Cholecalciferol (VITAMIN D) 1 type  Constipation, unspecified constipation type      The plan is as follows  Doris Villalpandotinkylerlatoya was seen today for foot pain. Diagnoses and all orders for this visit:    Right foot pain -ibuprofen 600mg TID for anti-inflammatory effect.  Start physical therapy if

## 2020-12-14 ENCOUNTER — LAB ENCOUNTER (OUTPATIENT)
Dept: LAB | Age: 30
End: 2020-12-14
Attending: INTERNAL MEDICINE
Payer: COMMERCIAL

## 2020-12-14 ENCOUNTER — TELEMEDICINE (OUTPATIENT)
Dept: INTERNAL MEDICINE CLINIC | Facility: CLINIC | Age: 30
End: 2020-12-14
Payer: COMMERCIAL

## 2020-12-14 DIAGNOSIS — J02.9 SORE THROAT: ICD-10-CM

## 2020-12-14 DIAGNOSIS — J02.9 SORE THROAT: Primary | ICD-10-CM

## 2020-12-14 PROCEDURE — 99213 OFFICE O/P EST LOW 20 MIN: CPT | Performed by: INTERNAL MEDICINE

## 2020-12-14 NOTE — PROGRESS NOTES
Virtual Telephone Check-In    This visit is conducted using Telemedicine with live, interactive video and audio.  [place]   Patient understands and accepts financial responsibility for any deductible, co-insurance and/or co-pays associat about a cough/sore throat for the past 1 day. She has a 4 month old who has a cough as well and was evaluated by the pediatrician and everything remains stable. She has not traveled anywhere recently, but would like to be tested for COVID.  No fevers or chi

## 2021-03-05 ENCOUNTER — TELEPHONE (OUTPATIENT)
Dept: INTERNAL MEDICINE CLINIC | Facility: CLINIC | Age: 31
End: 2021-03-05

## 2021-03-05 ENCOUNTER — TELEPHONE (OUTPATIENT)
Dept: OBGYN CLINIC | Facility: CLINIC | Age: 31
End: 2021-03-05

## 2021-03-05 NOTE — TELEPHONE ENCOUNTER
Patient had  on 2020. She stated she woke up at 3 AM with significant breast pain. At 0730 she woke up and was running fever as well. She also has chills, body aches, headache. Denies any redness or streaking at this time.    Last OV date: 10/15/2

## 2021-03-05 NOTE — TELEPHONE ENCOUNTER
Patient called stating that she is currently breastfeeding. She recently started to experience significant breast pain, also fever, nausea and headaches. No appointments available for today.  Please advise

## 2021-03-05 NOTE — TELEPHONE ENCOUNTER
DHAVAL    Returned pt's call, c/o breast pain that started at 3am today, pt believes it is because she is currently breast feeding. Around 8 am, pt says her symptoms began to progress. Pt currently has a fever, last reading 101.  Chills, headache, inner earach

## 2021-03-08 NOTE — TELEPHONE ENCOUNTER
Spoke with patient. She is on day 3 of antibiotics for Mastitis. She stated that her fever, chills, flu-like symptoms have resolved. She still has some breast redness and pain, but it is improving. She hasn't used warm compresses since Friday.    Orion

## 2021-03-08 NOTE — TELEPHONE ENCOUNTER
I also recommend she continue warm compress and massage every 2-3 hours prior to nursing/ pumping for the next few days as long as she has any symptoms/ pain/ redness while she is completing the full course of antibiotics to prevent reoccurrence.

## 2021-03-25 ENCOUNTER — TELEPHONE (OUTPATIENT)
Dept: OBGYN CLINIC | Facility: CLINIC | Age: 31
End: 2021-03-25

## 2021-03-25 NOTE — TELEPHONE ENCOUNTER
Patient is experiencing, breast pain and discoloration. She is 6 months postpartum. Can you please call her.

## 2021-03-25 NOTE — TELEPHONE ENCOUNTER
26year old patient complaining of breast pain and area of discoloration on her breast. She did have mastitis on 3/5/21 and feels symptoms have resolved. This area of concern has been there for 2 weeks now. She is uncertain if she is having any lumps.    L

## 2021-03-26 ENCOUNTER — OFFICE VISIT (OUTPATIENT)
Dept: OBGYN CLINIC | Facility: CLINIC | Age: 31
End: 2021-03-26
Payer: COMMERCIAL

## 2021-03-26 VITALS
HEIGHT: 63 IN | BODY MASS INDEX: 24.13 KG/M2 | DIASTOLIC BLOOD PRESSURE: 66 MMHG | SYSTOLIC BLOOD PRESSURE: 104 MMHG | WEIGHT: 136.19 LBS | HEART RATE: 85 BPM

## 2021-03-26 DIAGNOSIS — O92.79 PAIN AGGRAVATED BY BREAST FEEDING: Primary | ICD-10-CM

## 2021-03-26 DIAGNOSIS — R52 PAIN AGGRAVATED BY BREAST FEEDING: Primary | ICD-10-CM

## 2021-03-26 PROCEDURE — 3078F DIAST BP <80 MM HG: CPT | Performed by: NURSE PRACTITIONER

## 2021-03-26 PROCEDURE — 3008F BODY MASS INDEX DOCD: CPT | Performed by: NURSE PRACTITIONER

## 2021-03-26 PROCEDURE — 99213 OFFICE O/P EST LOW 20 MIN: CPT | Performed by: NURSE PRACTITIONER

## 2021-03-26 PROCEDURE — 3074F SYST BP LT 130 MM HG: CPT | Performed by: NURSE PRACTITIONER

## 2021-03-26 NOTE — PROGRESS NOTES
Gyne note     S: patient is a 26year old yo  here for a recent onset of diffuse right breast changes. She was treated for mastitis the first week of March.  She felt it took a full 5 days for the antibiotics to really make any improvement, and  10 f

## 2021-03-31 ENCOUNTER — HOSPITAL ENCOUNTER (OUTPATIENT)
Dept: MAMMOGRAPHY | Facility: HOSPITAL | Age: 31
Discharge: HOME | End: 2021-03-31
Attending: NURSE PRACTITIONER
Payer: COMMERCIAL

## 2021-03-31 DIAGNOSIS — R52 PAIN AGGRAVATED BY BREAST FEEDING: ICD-10-CM

## 2021-03-31 DIAGNOSIS — O92.79 PAIN AGGRAVATED BY BREAST FEEDING: ICD-10-CM

## 2021-03-31 PROCEDURE — 76641 ULTRASOUND BREAST COMPLETE: CPT | Performed by: NURSE PRACTITIONER

## 2021-06-06 ENCOUNTER — PATIENT MESSAGE (OUTPATIENT)
Dept: NEUROLOGY | Facility: CLINIC | Age: 31
End: 2021-06-06

## 2021-06-07 ENCOUNTER — OFFICE VISIT (OUTPATIENT)
Dept: NEUROLOGY | Facility: CLINIC | Age: 31
End: 2021-06-07
Payer: COMMERCIAL

## 2021-06-07 ENCOUNTER — NURSE ONLY (OUTPATIENT)
Dept: HEMATOLOGY/ONCOLOGY | Facility: HOSPITAL | Age: 31
End: 2021-06-07
Attending: SPECIALIST
Payer: COMMERCIAL

## 2021-06-07 VITALS
RESPIRATION RATE: 16 BRPM | HEART RATE: 78 BPM | WEIGHT: 130 LBS | DIASTOLIC BLOOD PRESSURE: 68 MMHG | SYSTOLIC BLOOD PRESSURE: 116 MMHG | BODY MASS INDEX: 23 KG/M2

## 2021-06-07 DIAGNOSIS — D70.9 NEUTROPENIA, UNSPECIFIED TYPE (HCC): Primary | ICD-10-CM

## 2021-06-07 DIAGNOSIS — D70.9 NEUTROPENIA, UNSPECIFIED TYPE (HCC): ICD-10-CM

## 2021-06-07 DIAGNOSIS — R42 DIZZINESS: ICD-10-CM

## 2021-06-07 DIAGNOSIS — G93.9 CHRONIC NON-SPECIFIC WHITE MATTER LESIONS ON MRI: Primary | ICD-10-CM

## 2021-06-07 PROCEDURE — 80053 COMPREHEN METABOLIC PANEL: CPT

## 2021-06-07 PROCEDURE — 3078F DIAST BP <80 MM HG: CPT | Performed by: OTHER

## 2021-06-07 PROCEDURE — 3074F SYST BP LT 130 MM HG: CPT | Performed by: OTHER

## 2021-06-07 PROCEDURE — 85025 COMPLETE CBC W/AUTO DIFF WBC: CPT

## 2021-06-07 PROCEDURE — 36415 COLL VENOUS BLD VENIPUNCTURE: CPT

## 2021-06-07 PROCEDURE — 99213 OFFICE O/P EST LOW 20 MIN: CPT | Performed by: OTHER

## 2021-06-07 NOTE — TELEPHONE ENCOUNTER
From: Desirae Swift  To:  Charis Kanner, MD  Sent: 6/6/2021 8:38 AM CDT  Subject: Non-Urgent Medical Question    Hi Dr. Lynelle Claude had a resurgence of my symptoms over the past couple of months and a new symptom (my left eye is slow to open in th

## 2021-06-07 NOTE — PROGRESS NOTES
Patient reports dizziness especially when laying down and turning head. Left eye has been slow to open in am. Has had some word finding difficulty/ sluggish brain/ slow processing.

## 2021-06-07 NOTE — PROGRESS NOTES
HPI:    Patient ID: Blaze Hernandez is a 26year old female. PCP: Dr Eugenio Narayanan      Patient is a 26year old female who presents for follow up. She was last seen in June 2019. MRI brain at that repeated shows stable couple nonspecific white matter changes. Smoking status: Never Smoker      Smokeless tobacco: Never Used    Vaping Use      Vaping Use: Never used    Alcohol use:  Yes      Alcohol/week: 0.0 standard drinks      Types: 2 Glasses of wine per week    Drug use: No         Review of Systems   Constitu Strength is 5/5 in all muscle groups  Reflexes: symmetric and present  Coordination: Intact   Gait: Normal casual gait           ASSESSMENT/PLAN:   (G93.9) Chronic non-specific white matter lesions on MRI  (primary encounter diagnosis)  Plan: MRI BRAIN (W+

## 2021-06-16 ENCOUNTER — HOSPITAL ENCOUNTER (OUTPATIENT)
Dept: MRI IMAGING | Facility: HOSPITAL | Age: 31
Discharge: HOME | End: 2021-06-16
Attending: Other
Payer: COMMERCIAL

## 2021-06-16 DIAGNOSIS — G93.9 CHRONIC NON-SPECIFIC WHITE MATTER LESIONS ON MRI: ICD-10-CM

## 2021-06-16 PROCEDURE — A9575 INJ GADOTERATE MEGLUMI 0.1ML: HCPCS

## 2021-06-16 PROCEDURE — 70553 MRI BRAIN STEM W/O & W/DYE: CPT | Performed by: OTHER

## 2021-06-28 ENCOUNTER — APPOINTMENT (OUTPATIENT)
Dept: HEMATOLOGY/ONCOLOGY | Facility: HOSPITAL | Age: 31
End: 2021-06-28
Attending: SPECIALIST
Payer: COMMERCIAL

## 2021-07-15 ENCOUNTER — HOSPITAL ENCOUNTER (OUTPATIENT)
Age: 31
Discharge: HOME | End: 2021-07-15
Payer: COMMERCIAL

## 2021-07-15 VITALS
TEMPERATURE: 98 F | DIASTOLIC BLOOD PRESSURE: 82 MMHG | RESPIRATION RATE: 18 BRPM | OXYGEN SATURATION: 97 % | HEART RATE: 102 BPM | SYSTOLIC BLOOD PRESSURE: 140 MMHG

## 2021-07-15 DIAGNOSIS — J01.40 ACUTE PANSINUSITIS, RECURRENCE NOT SPECIFIED: Primary | ICD-10-CM

## 2021-07-15 DIAGNOSIS — H66.90 ACUTE OTITIS MEDIA, UNSPECIFIED OTITIS MEDIA TYPE: ICD-10-CM

## 2021-07-15 DIAGNOSIS — R09.82 PND (POST-NASAL DRIP): ICD-10-CM

## 2021-07-15 LAB
B-HCG UR QL: NEGATIVE
SARS-COV-2 RNA RESP QL NAA+PROBE: NOT DETECTED

## 2021-07-15 PROCEDURE — 99213 OFFICE O/P EST LOW 20 MIN: CPT

## 2021-07-15 PROCEDURE — 81025 URINE PREGNANCY TEST: CPT

## 2021-07-15 RX ORDER — AMOXICILLIN 875 MG/1
875 TABLET, COATED ORAL 2 TIMES DAILY
Qty: 20 TABLET | Refills: 0 | Status: SHIPPED | OUTPATIENT
Start: 2021-07-15 | End: 2021-07-25

## 2021-07-15 RX ORDER — DEXAMETHASONE 4 MG/1
16 TABLET ORAL ONCE
Status: COMPLETED | OUTPATIENT
Start: 2021-07-15 | End: 2021-07-15

## 2021-07-15 NOTE — ED PROVIDER NOTES
Patient Seen in: [facility]      History   Patient presents with:  Cough    Stated Complaint: Cough    HPI/Subjective:   HPI    57-year-old female here with complaint of cough and congestion for the past 2 to 3 weeks in tandem with sinus 140/82   Pulse 102   Temp 97.7 °F (36.5 °C)   Resp 18   SpO2 97%   Breastfeeding Yes         Physical Exam  Vitals and nursing note reviewed. Constitutional:       Appearance: She is well-developed. HENT:      Head: Normocephalic. Jaw:  There is no Take the full course of antibiotics as prescribed in tandem with a probiotic daily. If anything changes i.e. increased fevers shortness of breath etc. go to the emergency room. Otherwise follow-up with your primary care physician.       The patient is enc

## 2021-07-16 NOTE — L&D DELIVERY NOTE
From: Winnie Arriaza  To: Carmen Castillo  Sent: 7/16/2021 9:13 AM CDT  Subject: Imaging Question    Hi,   I talked to the nurse about my abd US results and saw the imaging result as well. So I have just One stone or that is all that was visualized? I also have an appt with a surgeon Dr Zhang as you recommended however I am wondering if I should also see a GI doc as well to confirm that my next steps are surgery etc? Thank you!   Vaginal Delivery Note          Lacey Blevins Patient Status:  Inpatient    1990 MRN [id number]   [facility] 1NW-A Attending Jonny Aretaga MD   Hosp Day # 0 PCP PHYSICIAN NONSTAFF       Pre Op Dx:  IUP at Term    Post Op Dx:  Tyhsawn

## 2021-08-04 DIAGNOSIS — H53.9 VISUAL DISTURBANCE: Primary | ICD-10-CM

## 2021-08-07 ENCOUNTER — LAB ENCOUNTER (OUTPATIENT)
Dept: LAB | Age: 31
End: 2021-08-07
Attending: INTERNAL MEDICINE
Payer: COMMERCIAL

## 2021-08-07 DIAGNOSIS — H53.9 VISUAL DISTURBANCE: ICD-10-CM

## 2021-08-07 PROCEDURE — 83519 RIA NONANTIBODY: CPT

## 2021-08-07 PROCEDURE — 84238 ASSAY NONENDOCRINE RECEPTOR: CPT

## 2021-08-07 PROCEDURE — 86255 FLUORESCENT ANTIBODY SCREEN: CPT

## 2021-08-07 PROCEDURE — 86256 FLUORESCENT ANTIBODY TITER: CPT

## 2021-08-07 PROCEDURE — 36415 COLL VENOUS BLD VENIPUNCTURE: CPT

## 2021-08-07 PROCEDURE — 83516 IMMUNOASSAY NONANTIBODY: CPT

## 2021-08-12 LAB
ACETYLCHOLINE BINDING AB: 0 NMOL/L
ACETYLCHOLINE BLOCKING AB: 24 %
STRIATED MUSCLE AB,IGG SCREEN: DETECTED
TITIN ANTIBODY: 0.15 IV

## 2021-08-16 ENCOUNTER — TELEPHONE (OUTPATIENT)
Dept: NEUROLOGY | Facility: CLINIC | Age: 31
End: 2021-08-16

## 2021-08-16 DIAGNOSIS — R89.9 ABNORMAL LABORATORY TEST: Primary | ICD-10-CM

## 2021-08-16 NOTE — TELEPHONE ENCOUNTER
Patient notified of Dr Cat Grow recommendations. Her sept qpp't cancelled and rescheduled for 11/15.

## 2021-08-16 NOTE — TELEPHONE ENCOUNTER
Patient had borderline Antistriated Ab test which could be nonspecifc but need monitoring and repeat test in about 12 weeks or sooner if new eye or neurological symptoms    Ach antibody panel was all negative. I will see in 3 months following repeat test. Order placed.

## 2021-10-06 ENCOUNTER — OFFICE VISIT (OUTPATIENT)
Dept: INTERNAL MEDICINE CLINIC | Facility: CLINIC | Age: 31
End: 2021-10-06
Payer: COMMERCIAL

## 2021-10-06 ENCOUNTER — LAB ENCOUNTER (OUTPATIENT)
Dept: LAB | Age: 31
End: 2021-10-06
Attending: INTERNAL MEDICINE
Payer: COMMERCIAL

## 2021-10-06 VITALS
RESPIRATION RATE: 16 BRPM | TEMPERATURE: 98 F | OXYGEN SATURATION: 100 % | HEART RATE: 76 BPM | DIASTOLIC BLOOD PRESSURE: 70 MMHG | WEIGHT: 131.81 LBS | HEIGHT: 63 IN | BODY MASS INDEX: 23.36 KG/M2 | SYSTOLIC BLOOD PRESSURE: 100 MMHG

## 2021-10-06 DIAGNOSIS — E55.9 HYPOVITAMINOSIS D: ICD-10-CM

## 2021-10-06 DIAGNOSIS — H57.9 LEFT EYE SYMPTOMS: ICD-10-CM

## 2021-10-06 DIAGNOSIS — Z00.00 ENCOUNTER FOR ROUTINE ADULT MEDICAL EXAMINATION: Primary | ICD-10-CM

## 2021-10-06 DIAGNOSIS — R42 DIZZINESS: ICD-10-CM

## 2021-10-06 DIAGNOSIS — Z23 NEED FOR INFLUENZA VACCINATION: ICD-10-CM

## 2021-10-06 DIAGNOSIS — E53.8 LOW VITAMIN B12 LEVEL: ICD-10-CM

## 2021-10-06 DIAGNOSIS — Z00.00 LABORATORY EXAM ORDERED AS PART OF ROUTINE GENERAL MEDICAL EXAMINATION: ICD-10-CM

## 2021-10-06 PROCEDURE — 82607 VITAMIN B-12: CPT

## 2021-10-06 PROCEDURE — 90471 IMMUNIZATION ADMIN: CPT | Performed by: INTERNAL MEDICINE

## 2021-10-06 PROCEDURE — 3074F SYST BP LT 130 MM HG: CPT | Performed by: INTERNAL MEDICINE

## 2021-10-06 PROCEDURE — 99395 PREV VISIT EST AGE 18-39: CPT | Performed by: INTERNAL MEDICINE

## 2021-10-06 PROCEDURE — 3078F DIAST BP <80 MM HG: CPT | Performed by: INTERNAL MEDICINE

## 2021-10-06 PROCEDURE — 85025 COMPLETE CBC W/AUTO DIFF WBC: CPT

## 2021-10-06 PROCEDURE — 36415 COLL VENOUS BLD VENIPUNCTURE: CPT

## 2021-10-06 PROCEDURE — 3008F BODY MASS INDEX DOCD: CPT | Performed by: INTERNAL MEDICINE

## 2021-10-06 PROCEDURE — 82306 VITAMIN D 25 HYDROXY: CPT

## 2021-10-06 PROCEDURE — 90686 IIV4 VACC NO PRSV 0.5 ML IM: CPT | Performed by: INTERNAL MEDICINE

## 2021-10-06 PROCEDURE — 84443 ASSAY THYROID STIM HORMONE: CPT

## 2021-10-06 RX ORDER — B-COMPLEX WITH VITAMIN C
TABLET ORAL
COMMUNITY

## 2021-10-06 NOTE — PROGRESS NOTES
[facility] Internal [facility] Office Note  Chief Complaint:   Patient presents with:  Physical  Dizziness  Eye Problem      HPI:   This is a 33year old female coming in for physical, dizziness, and eye problems   HPI    Dizziness since 4/2018   S Cancer Paternal Aunt         breast   • Diabetes Paternal Aunt    • Diabetes Paternal Uncle    • Heart Disorder Paternal Uncle    • Cancer Paternal Aunt         I reviewed her's Past Medical History, Past Surgical History, Family History and   Social Histo atraumatic. Eyes:      Conjunctiva/sclera: Conjunctivae normal.   Cardiovascular:      Rate and Rhythm: Normal rate and regular rhythm. Heart sounds: Normal heart sounds.    Pulmonary:      Effort: Pulmonary effort is normal.      Breath sounds: Norm ML    Hypovitaminosis D - low in past; recheck   -     VITAMIN D, 25-HYDROXY; Future    Low vitamin B12 level -low in past; recheck   -     VITAMIN B12; Future    Laboratory exam ordered as part of routine general medical examination  -     TSH W REFLEX TO

## 2021-10-06 NOTE — PATIENT INSTRUCTIONS
Blood work     Follow up with neuro-ophthalmologist at [facility] - [phone]  Dr. Jg Smith, Dr. Tiffany Jade, Dr. Collette Dimmer     Follow up at [address] [address] (phone number on other page)

## 2021-10-21 PROBLEM — G43.809 VESTIBULAR MIGRAINE: Status: ACTIVE | Noted: 2021-10-21

## 2021-11-08 ENCOUNTER — OFFICE VISIT (OUTPATIENT)
Dept: OBGYN CLINIC | Facility: CLINIC | Age: 31
End: 2021-11-08
Payer: COMMERCIAL

## 2021-11-08 ENCOUNTER — LAB ENCOUNTER (OUTPATIENT)
Dept: LAB | Age: 31
End: 2021-11-08
Attending: Other
Payer: COMMERCIAL

## 2021-11-08 VITALS
HEIGHT: 63 IN | SYSTOLIC BLOOD PRESSURE: 102 MMHG | WEIGHT: 134.81 LBS | DIASTOLIC BLOOD PRESSURE: 66 MMHG | BODY MASS INDEX: 23.89 KG/M2

## 2021-11-08 DIAGNOSIS — R89.9 ABNORMAL LABORATORY TEST: ICD-10-CM

## 2021-11-08 DIAGNOSIS — N89.8 VAGINAL LEUKORRHEA: ICD-10-CM

## 2021-11-08 DIAGNOSIS — N81.89 PELVIC FLOOR WEAKNESS: ICD-10-CM

## 2021-11-08 DIAGNOSIS — Z30.09 BIRTH CONTROL COUNSELING: ICD-10-CM

## 2021-11-08 DIAGNOSIS — R10.2 PELVIC PAIN: ICD-10-CM

## 2021-11-08 DIAGNOSIS — Z12.4 CERVICAL CANCER SCREENING: ICD-10-CM

## 2021-11-08 DIAGNOSIS — Z01.419 WELL WOMAN EXAM WITH ROUTINE GYNECOLOGICAL EXAM: Primary | ICD-10-CM

## 2021-11-08 PROCEDURE — 36415 COLL VENOUS BLD VENIPUNCTURE: CPT

## 2021-11-08 PROCEDURE — 88175 CYTOPATH C/V AUTO FLUID REDO: CPT | Performed by: NURSE PRACTITIONER

## 2021-11-08 PROCEDURE — 87480 CANDIDA DNA DIR PROBE: CPT | Performed by: NURSE PRACTITIONER

## 2021-11-08 PROCEDURE — 87624 HPV HI-RISK TYP POOLED RSLT: CPT | Performed by: NURSE PRACTITIONER

## 2021-11-08 PROCEDURE — 3008F BODY MASS INDEX DOCD: CPT | Performed by: NURSE PRACTITIONER

## 2021-11-08 PROCEDURE — 3074F SYST BP LT 130 MM HG: CPT | Performed by: NURSE PRACTITIONER

## 2021-11-08 PROCEDURE — 87660 TRICHOMONAS VAGIN DIR PROBE: CPT | Performed by: NURSE PRACTITIONER

## 2021-11-08 PROCEDURE — 87510 GARDNER VAG DNA DIR PROBE: CPT | Performed by: NURSE PRACTITIONER

## 2021-11-08 PROCEDURE — 3078F DIAST BP <80 MM HG: CPT | Performed by: NURSE PRACTITIONER

## 2021-11-08 PROCEDURE — 99395 PREV VISIT EST AGE 18-39: CPT | Performed by: NURSE PRACTITIONER

## 2021-11-08 PROCEDURE — 86255 FLUORESCENT ANTIBODY SCREEN: CPT

## 2021-11-08 RX ORDER — CHOLECALCIFEROL (VITAMIN D3) 100000/G
2000 POWDER (GRAM) MISCELLANEOUS DAILY
COMMUNITY

## 2021-11-08 NOTE — PATIENT INSTRUCTIONS
Birth Control: IUD (Intrauterine Device)    The IUD (intrauterine device) is small, flexible, and T-shaped. A trained healthcare provider places it in the uterus. The IUD is one of the most effective birth control methods. It's also reversible.  This mean have:  · A sexually transmitted infection (STI) or possible STI  · Liver problems  · Blood clots (for progestin IUD only)  · Breast cancer or a history of breast cancer (progestin IUD only)  Cecily last reviewed this educational content on 5/1/2020 © 20

## 2021-11-08 NOTE — PROGRESS NOTES
Here for Routine Annual Exam  Menses just resumed last month, she stopped nursing 8/2021. She feels her left breast is more dense. She notes a 5 day history of increased discharge, she denies any other symptoms.   She has had intermittent pelvic pain e placed on menses    5.  Pelvic floor weakness  She is to call if she desires pelvic floor PT    6. Pelvic pain  Pelvic US if persists     Return to clinic 1 year for routine exam, or as needed with any concerns or question

## 2021-11-15 ENCOUNTER — TELEPHONE (OUTPATIENT)
Dept: OBGYN CLINIC | Facility: CLINIC | Age: 31
End: 2021-11-15

## 2021-11-15 ENCOUNTER — TELEMEDICINE (OUTPATIENT)
Dept: NEUROLOGY | Facility: CLINIC | Age: 31
End: 2021-11-15
Payer: COMMERCIAL

## 2021-11-15 DIAGNOSIS — R42 DIZZINESS: ICD-10-CM

## 2021-11-15 DIAGNOSIS — G43.109 MIGRAINE WITH AURA AND WITHOUT STATUS MIGRAINOSUS, NOT INTRACTABLE: ICD-10-CM

## 2021-11-15 DIAGNOSIS — G93.9 CHRONIC NON-SPECIFIC WHITE MATTER LESIONS ON MRI: ICD-10-CM

## 2021-11-15 PROCEDURE — 99213 OFFICE O/P EST LOW 20 MIN: CPT | Performed by: OTHER

## 2021-11-15 NOTE — TELEPHONE ENCOUNTER
Patient received the booster on Friday. Saturday, she had muscle aches, fatigue, fever, and overall malaise. She also had some diarrhea and 1 episode of vomiting. She has not had any vomiting since.    She started the Flagyl many days before these sympt

## 2021-11-15 NOTE — TELEPHONE ENCOUNTER
Patient was prescribed Flagyl. She got her vaccine on Friday. Yesterday she had vomiting and diarrhea and 100.6 fever. Today she still has diarrhea and a 100.6 fever. Does she continue the Flagyl? Does she need to go to the urgent care?

## 2021-11-15 NOTE — PROGRESS NOTES
HPI:    Patient ID: Shell Richardson is a 33year old female. PCP: Dr Rose Hendrickson        This visit is conducted using Telemedicine with live, interactive video and audio.     Patient has been referred to the [facility] website at [email or web address] to review t Neutropenia, cyclic [facility])     Nov 0066   • Pap smear abnormality of cervix     hx of abnormal cells   • Vertigo 04/08/2018    follows with ENT      Past Surgical History:   Procedure Laterality Date   • ADENOIDECTOMY     • CREATE EARDRUM OPENING,GEN ANESTH Sig Dispense Refill   • metRONIDAZOLE (FLAGYL) 500 MG Oral Tab Take 1 tablet (500 mg total) by mouth 2 (two) times daily with meals for 7 days. 14 tablet 0   • Ascorbic Acid (VITAMIN C OR) Take by mouth daily.      • Cholecalciferol (VITAMIN D3) Does not ap therapy    4.  Left eye mild ptosis unspecific  Repeat Myasthenia panel unremarkable  Neuro-ophthalmology evaluation  Non painful ptosis so less likely compressive lesions or aneursym  If persistent will do additional work up- [facility] with repetitive stimulatio

## 2021-12-22 ENCOUNTER — TELEMEDICINE (OUTPATIENT)
Dept: INTERNAL MEDICINE CLINIC | Facility: CLINIC | Age: 31
End: 2021-12-22

## 2021-12-22 ENCOUNTER — NURSE ONLY (OUTPATIENT)
Dept: LAB | Age: 31
End: 2021-12-22
Attending: INTERNAL MEDICINE
Payer: COMMERCIAL

## 2021-12-22 DIAGNOSIS — J02.9 SORE THROAT: ICD-10-CM

## 2021-12-22 DIAGNOSIS — J02.9 SORE THROAT: Primary | ICD-10-CM

## 2021-12-22 DIAGNOSIS — R09.81 NASAL CONGESTION: ICD-10-CM

## 2021-12-22 PROCEDURE — 99213 OFFICE O/P EST LOW 20 MIN: CPT | Performed by: INTERNAL MEDICINE

## 2021-12-22 NOTE — PROGRESS NOTES
This is a telemedicine visit with live, interactive video and audio. Patient understands and accepts financial responsibility for any deductible, co-insurance and/or co-pays associated with this service.     SUBJECTIVE  Sore throat, runny nose, tickle i OTHER (SEE COMMENTS)    Comment:Tachycardia, as a child             palpatations   Current Outpatient Medications   Medication Sig Dispense Refill   • Ascorbic Acid (VITAMIN C OR) Take by mouth daily.      • Cholecalciferol (VITAMIN D3) Does not ap

## 2021-12-27 ENCOUNTER — NURSE ONLY (OUTPATIENT)
Dept: LAB | Age: 31
End: 2021-12-27
Attending: INTERNAL MEDICINE
Payer: COMMERCIAL

## 2021-12-27 DIAGNOSIS — Z20.822 EXPOSURE TO COVID-19 VIRUS: ICD-10-CM

## 2021-12-27 DIAGNOSIS — J02.9 SORE THROAT: ICD-10-CM

## 2021-12-27 DIAGNOSIS — J02.9 SORE THROAT: Primary | ICD-10-CM

## 2022-01-14 ENCOUNTER — TELEPHONE (OUTPATIENT)
Dept: INTERNAL MEDICINE CLINIC | Facility: CLINIC | Age: 32
End: 2022-01-14

## 2022-01-14 ENCOUNTER — TELEPHONE (OUTPATIENT)
Dept: NEUROLOGY | Facility: CLINIC | Age: 32
End: 2022-01-14

## 2022-01-14 DIAGNOSIS — Z11.52 ENCOUNTER FOR SCREENING FOR COVID-19: Primary | ICD-10-CM

## 2022-01-14 NOTE — TELEPHONE ENCOUNTER
Pt called office and stated daughter tested positive for covid today 1/14. Pt states she is experiencing symptoms such as congestion and headache that first began 3 days ago. Pt would like to get tested. Please advise. [phone].

## 2022-01-15 ENCOUNTER — NURSE ONLY (OUTPATIENT)
Dept: LAB | Facility: HOSPITAL | Age: 32
End: 2022-01-15
Attending: INTERNAL MEDICINE
Payer: COMMERCIAL

## 2022-01-15 DIAGNOSIS — Z11.52 ENCOUNTER FOR SCREENING FOR COVID-19: ICD-10-CM

## 2022-01-19 LAB — SARS-COV-2 RNA RESP QL NAA+PROBE: DETECTED

## 2022-03-28 ENCOUNTER — HOSPITAL ENCOUNTER (OUTPATIENT)
Age: 32
Discharge: HOME | End: 2022-03-28
Attending: EMERGENCY MEDICINE
Payer: COMMERCIAL

## 2022-03-28 VITALS
DIASTOLIC BLOOD PRESSURE: 93 MMHG | WEIGHT: 130 LBS | SYSTOLIC BLOOD PRESSURE: 123 MMHG | OXYGEN SATURATION: 100 % | HEIGHT: 63 IN | TEMPERATURE: 97 F | RESPIRATION RATE: 18 BRPM | HEART RATE: 92 BPM | BODY MASS INDEX: 23.04 KG/M2

## 2022-03-28 DIAGNOSIS — J01.90 ACUTE SINUSITIS, RECURRENCE NOT SPECIFIED, UNSPECIFIED LOCATION: Primary | ICD-10-CM

## 2022-03-28 PROCEDURE — 99213 OFFICE O/P EST LOW 20 MIN: CPT

## 2022-03-28 RX ORDER — AMOXICILLIN AND CLAVULANATE POTASSIUM 875; 125 MG/1; MG/1
1 TABLET, FILM COATED ORAL 2 TIMES DAILY
Qty: 14 TABLET | Refills: 0 | Status: SHIPPED | OUTPATIENT
Start: 2022-03-28 | End: 2022-04-04 | Stop reason: ALTCHOICE

## 2022-03-28 NOTE — ED INITIAL ASSESSMENT (HPI)
Patient states that 8 days ago began having nasal congestion and runny nose, she states that she still has a slight sore throat but worsening nasal congestion with yellow discharge. Also c/o cough and sinus pressure. Denies fevers, patient is vaccinated with booster, had Covid last January

## 2022-04-04 ENCOUNTER — OFFICE VISIT (OUTPATIENT)
Dept: INTERNAL MEDICINE CLINIC | Facility: CLINIC | Age: 32
End: 2022-04-04
Payer: COMMERCIAL

## 2022-04-04 VITALS
RESPIRATION RATE: 16 BRPM | OXYGEN SATURATION: 98 % | DIASTOLIC BLOOD PRESSURE: 65 MMHG | HEIGHT: 63 IN | SYSTOLIC BLOOD PRESSURE: 100 MMHG | WEIGHT: 132.19 LBS | BODY MASS INDEX: 23.42 KG/M2 | HEART RATE: 93 BPM | TEMPERATURE: 99 F

## 2022-04-04 DIAGNOSIS — H69.81 DYSFUNCTION OF RIGHT EUSTACHIAN TUBE: ICD-10-CM

## 2022-04-04 DIAGNOSIS — R09.81 NASAL CONGESTION: ICD-10-CM

## 2022-04-04 DIAGNOSIS — M54.50 ACUTE LEFT-SIDED LOW BACK PAIN WITHOUT SCIATICA: ICD-10-CM

## 2022-04-04 DIAGNOSIS — J01.90 ACUTE NON-RECURRENT SINUSITIS, UNSPECIFIED LOCATION: Primary | ICD-10-CM

## 2022-04-04 PROCEDURE — 3074F SYST BP LT 130 MM HG: CPT | Performed by: INTERNAL MEDICINE

## 2022-04-04 PROCEDURE — 3078F DIAST BP <80 MM HG: CPT | Performed by: INTERNAL MEDICINE

## 2022-04-04 PROCEDURE — 3008F BODY MASS INDEX DOCD: CPT | Performed by: INTERNAL MEDICINE

## 2022-04-04 PROCEDURE — 99213 OFFICE O/P EST LOW 20 MIN: CPT | Performed by: INTERNAL MEDICINE

## 2022-04-04 NOTE — PATIENT INSTRUCTIONS
2 aleve in the morning and 2 aleve in the evening and take with food. Continue flonase.      If symptoms are not improving in one week, please let me know and follow up with your ENT specialist.

## 2022-04-29 ENCOUNTER — OFFICE VISIT (OUTPATIENT)
Dept: NEUROLOGY | Facility: CLINIC | Age: 32
End: 2022-04-29
Payer: COMMERCIAL

## 2022-04-29 VITALS
RESPIRATION RATE: 16 BRPM | DIASTOLIC BLOOD PRESSURE: 66 MMHG | BODY MASS INDEX: 23.04 KG/M2 | SYSTOLIC BLOOD PRESSURE: 90 MMHG | HEART RATE: 94 BPM | WEIGHT: 130 LBS | HEIGHT: 63 IN

## 2022-04-29 DIAGNOSIS — G93.9 CHRONIC NON-SPECIFIC WHITE MATTER LESIONS ON MRI: ICD-10-CM

## 2022-04-29 DIAGNOSIS — G43.809 VESTIBULAR MIGRAINE: ICD-10-CM

## 2022-04-29 DIAGNOSIS — R42 DIZZINESS: ICD-10-CM

## 2022-04-29 DIAGNOSIS — R48.2 APRAXIA OF EYELID: ICD-10-CM

## 2022-04-29 PROCEDURE — 99214 OFFICE O/P EST MOD 30 MIN: CPT | Performed by: OTHER

## 2022-04-29 PROCEDURE — 3008F BODY MASS INDEX DOCD: CPT | Performed by: OTHER

## 2022-04-29 PROCEDURE — 3074F SYST BP LT 130 MM HG: CPT | Performed by: OTHER

## 2022-04-29 PROCEDURE — 3078F DIAST BP <80 MM HG: CPT | Performed by: OTHER

## 2022-04-29 RX ORDER — VENLAFAXINE HYDROCHLORIDE 37.5 MG/1
37.5 CAPSULE, EXTENDED RELEASE ORAL DAILY
Qty: 30 CAPSULE | Refills: 2 | Status: SHIPPED | OUTPATIENT
Start: 2022-04-29

## 2022-04-29 NOTE — PROGRESS NOTES
LOV 11/15/21 Migraine/Dizziness f/u- Patient states she has been doing worse with dizziness. Patient states she has not experienced migraines.

## 2022-05-09 ENCOUNTER — APPOINTMENT (OUTPATIENT)
Dept: PHYSICAL THERAPY | Age: 32
End: 2022-05-09
Attending: Other
Payer: COMMERCIAL

## 2022-05-20 ENCOUNTER — TELEPHONE (OUTPATIENT)
Dept: PHYSICAL THERAPY | Facility: HOSPITAL | Age: 32
End: 2022-05-20

## 2022-05-23 ENCOUNTER — OFFICE VISIT (OUTPATIENT)
Dept: PHYSICAL THERAPY | Age: 32
End: 2022-05-23
Attending: Other
Payer: COMMERCIAL

## 2022-05-23 PROCEDURE — 97112 NEUROMUSCULAR REEDUCATION: CPT

## 2022-05-23 PROCEDURE — 97162 PT EVAL MOD COMPLEX 30 MIN: CPT

## 2022-06-06 ENCOUNTER — OFFICE VISIT (OUTPATIENT)
Dept: PHYSICAL THERAPY | Age: 32
End: 2022-06-06
Attending: Other
Payer: COMMERCIAL

## 2022-06-06 PROCEDURE — 97112 NEUROMUSCULAR REEDUCATION: CPT

## 2022-06-20 ENCOUNTER — APPOINTMENT (OUTPATIENT)
Dept: PHYSICAL THERAPY | Age: 32
End: 2022-06-20
Attending: Other
Payer: COMMERCIAL

## 2022-06-27 ENCOUNTER — OFFICE VISIT (OUTPATIENT)
Dept: PHYSICAL THERAPY | Age: 32
End: 2022-06-27
Attending: Other
Payer: COMMERCIAL

## 2022-06-27 PROCEDURE — 97112 NEUROMUSCULAR REEDUCATION: CPT

## 2022-06-27 PROCEDURE — 97140 MANUAL THERAPY 1/> REGIONS: CPT

## 2022-07-12 ENCOUNTER — APPOINTMENT (OUTPATIENT)
Dept: PHYSICAL THERAPY | Age: 32
End: 2022-07-12
Attending: Other
Payer: COMMERCIAL

## 2022-07-18 ENCOUNTER — OFFICE VISIT (OUTPATIENT)
Dept: PHYSICAL THERAPY | Age: 32
End: 2022-07-18
Attending: Other
Payer: COMMERCIAL

## 2022-07-18 PROCEDURE — 97140 MANUAL THERAPY 1/> REGIONS: CPT

## 2022-07-18 PROCEDURE — 97112 NEUROMUSCULAR REEDUCATION: CPT

## 2022-07-25 ENCOUNTER — OFFICE VISIT (OUTPATIENT)
Dept: PHYSICAL THERAPY | Age: 32
End: 2022-07-25
Attending: Other
Payer: COMMERCIAL

## 2022-07-25 PROCEDURE — 97014 ELECTRIC STIMULATION THERAPY: CPT

## 2022-07-25 PROCEDURE — 97112 NEUROMUSCULAR REEDUCATION: CPT

## 2022-08-02 ENCOUNTER — APPOINTMENT (OUTPATIENT)
Dept: PHYSICAL THERAPY | Age: 32
End: 2022-08-02
Payer: COMMERCIAL

## 2022-08-08 ENCOUNTER — TELEPHONE (OUTPATIENT)
Dept: PHYSICAL THERAPY | Facility: HOSPITAL | Age: 32
End: 2022-08-08

## 2022-08-08 ENCOUNTER — OFFICE VISIT (OUTPATIENT)
Dept: PHYSICAL THERAPY | Age: 32
End: 2022-08-08
Attending: INTERNAL MEDICINE
Payer: COMMERCIAL

## 2022-08-08 PROCEDURE — 97014 ELECTRIC STIMULATION THERAPY: CPT

## 2022-08-08 PROCEDURE — 97112 NEUROMUSCULAR REEDUCATION: CPT

## 2022-08-08 PROCEDURE — 97140 MANUAL THERAPY 1/> REGIONS: CPT

## 2022-08-15 ENCOUNTER — OFFICE VISIT (OUTPATIENT)
Dept: PHYSICAL THERAPY | Age: 32
End: 2022-08-15
Attending: INTERNAL MEDICINE
Payer: COMMERCIAL

## 2022-08-15 PROCEDURE — 97014 ELECTRIC STIMULATION THERAPY: CPT

## 2022-08-15 PROCEDURE — 97112 NEUROMUSCULAR REEDUCATION: CPT

## 2022-08-19 ENCOUNTER — OFFICE VISIT (OUTPATIENT)
Dept: NEUROLOGY | Facility: CLINIC | Age: 32
End: 2022-08-19
Payer: COMMERCIAL

## 2022-08-19 VITALS
BODY MASS INDEX: 24 KG/M2 | WEIGHT: 134.81 LBS | SYSTOLIC BLOOD PRESSURE: 118 MMHG | DIASTOLIC BLOOD PRESSURE: 64 MMHG | HEART RATE: 88 BPM

## 2022-08-19 DIAGNOSIS — G43.109 MIGRAINE WITH AURA AND WITHOUT STATUS MIGRAINOSUS, NOT INTRACTABLE: Primary | ICD-10-CM

## 2022-08-19 DIAGNOSIS — R42 DIZZINESS: ICD-10-CM

## 2022-08-19 PROCEDURE — 3078F DIAST BP <80 MM HG: CPT | Performed by: OTHER

## 2022-08-19 PROCEDURE — 3074F SYST BP LT 130 MM HG: CPT | Performed by: OTHER

## 2022-08-19 PROCEDURE — 99213 OFFICE O/P EST LOW 20 MIN: CPT | Performed by: OTHER

## 2022-08-19 RX ORDER — MULTIVIT,IRON,MINERALS/LUTEIN
1 TABLET ORAL
COMMUNITY

## 2022-08-22 ENCOUNTER — APPOINTMENT (OUTPATIENT)
Dept: PHYSICAL THERAPY | Age: 32
End: 2022-08-22
Payer: COMMERCIAL

## 2022-08-29 ENCOUNTER — APPOINTMENT (OUTPATIENT)
Dept: PHYSICAL THERAPY | Age: 32
End: 2022-08-29
Attending: INTERNAL MEDICINE
Payer: COMMERCIAL

## 2022-09-06 ENCOUNTER — OFFICE VISIT (OUTPATIENT)
Dept: OBGYN CLINIC | Facility: CLINIC | Age: 32
End: 2022-09-06
Payer: COMMERCIAL

## 2022-09-06 ENCOUNTER — ULTRASOUND ENCOUNTER (OUTPATIENT)
Dept: OBGYN CLINIC | Facility: CLINIC | Age: 32
End: 2022-09-06
Payer: COMMERCIAL

## 2022-09-06 VITALS
SYSTOLIC BLOOD PRESSURE: 102 MMHG | HEART RATE: 68 BPM | BODY MASS INDEX: 23.59 KG/M2 | WEIGHT: 133.13 LBS | DIASTOLIC BLOOD PRESSURE: 64 MMHG | HEIGHT: 63 IN

## 2022-09-06 DIAGNOSIS — N91.1 SECONDARY AMENORRHEA: Primary | ICD-10-CM

## 2022-09-06 DIAGNOSIS — Z87.59 HISTORY OF PRETERM PREMATURE RUPTURE OF MEMBRANES (PPROM): ICD-10-CM

## 2022-09-06 DIAGNOSIS — Z87.59 HISTORY OF PRECIPITOUS DELIVERY: ICD-10-CM

## 2022-09-06 PROCEDURE — 99213 OFFICE O/P EST LOW 20 MIN: CPT | Performed by: OBSTETRICS & GYNECOLOGY

## 2022-09-06 PROCEDURE — 76856 US EXAM PELVIC COMPLETE: CPT | Performed by: OBSTETRICS & GYNECOLOGY

## 2022-09-06 PROCEDURE — 3074F SYST BP LT 130 MM HG: CPT | Performed by: OBSTETRICS & GYNECOLOGY

## 2022-09-06 PROCEDURE — 3078F DIAST BP <80 MM HG: CPT | Performed by: OBSTETRICS & GYNECOLOGY

## 2022-09-06 PROCEDURE — 3008F BODY MASS INDEX DOCD: CPT | Performed by: OBSTETRICS & GYNECOLOGY

## 2022-09-06 RX ORDER — ELECTROLYTES/DEXTROSE
SOLUTION, ORAL ORAL
COMMUNITY

## 2022-09-12 ENCOUNTER — APPOINTMENT (OUTPATIENT)
Dept: PHYSICAL THERAPY | Age: 32
End: 2022-09-12
Payer: COMMERCIAL

## 2022-09-26 ENCOUNTER — TELEPHONE (OUTPATIENT)
Dept: OBGYN CLINIC | Facility: CLINIC | Age: 32
End: 2022-09-26

## 2022-09-26 ENCOUNTER — TELEPHONE (OUTPATIENT)
Dept: INTERNAL MEDICINE CLINIC | Facility: CLINIC | Age: 32
End: 2022-09-26

## 2022-09-26 DIAGNOSIS — U07.1 COVID-19: Primary | ICD-10-CM

## 2022-09-26 PROBLEM — O98.519 COVID-19 AFFECTING PREGNANCY, ANTEPARTUM (HCC): Status: ACTIVE | Noted: 2022-09-26

## 2022-09-26 PROBLEM — O98.519 COVID-19 AFFECTING PREGNANCY, ANTEPARTUM: Status: ACTIVE | Noted: 2022-09-26

## 2022-09-26 LAB — AMB EXT COVID-19 RESULT: DETECTED

## 2022-09-26 NOTE — TELEPHONE ENCOUNTER
Left detailed message on voicemail (OK from verbal release).  Stated on message that PCR order was placed and she can call central scheduling to set that up at [phone]

## 2022-09-26 NOTE — TELEPHONE ENCOUNTER
Pt tested positive for Covid today at home. Requesting PCR order to clarify she has COVID.      No available VV today   Please advise if order can be placed without visit

## 2022-09-26 NOTE — TELEPHONE ENCOUNTER
Medication list and covid info sent via [employer]    Contacted patient. Advised to contact PCP with covid concerns, ER precautions given and to call our office with any pregnancy concerns. Patient states understanding. Test result added to chart as well as dx. PSR staff, please contact patient to reschedule NOB for sometime after 10/5.

## 2022-09-26 NOTE — TELEPHONE ENCOUNTER
Pt tested positive today for covid, was with a friend who tested positive. She had a new ob appt today.

## 2022-09-27 ENCOUNTER — LAB ENCOUNTER (OUTPATIENT)
Dept: LAB | Age: 32
End: 2022-09-27
Attending: INTERNAL MEDICINE
Payer: COMMERCIAL

## 2022-09-27 DIAGNOSIS — U07.1 COVID-19: ICD-10-CM

## 2022-09-28 LAB — SARS-COV-2 RNA RESP QL NAA+PROBE: DETECTED

## 2022-10-10 ENCOUNTER — INITIAL PRENATAL (OUTPATIENT)
Dept: OBGYN CLINIC | Facility: CLINIC | Age: 32
End: 2022-10-10
Payer: COMMERCIAL

## 2022-10-10 ENCOUNTER — LAB ENCOUNTER (OUTPATIENT)
Dept: LAB | Age: 32
End: 2022-10-10
Attending: OBSTETRICS & GYNECOLOGY
Payer: COMMERCIAL

## 2022-10-10 VITALS
BODY MASS INDEX: 24.39 KG/M2 | DIASTOLIC BLOOD PRESSURE: 62 MMHG | HEIGHT: 63 IN | SYSTOLIC BLOOD PRESSURE: 100 MMHG | WEIGHT: 137.63 LBS

## 2022-10-10 DIAGNOSIS — Z34.80 PRENATAL CARE OF MULTIGRAVIDA, ANTEPARTUM: ICD-10-CM

## 2022-10-10 DIAGNOSIS — U07.1 COVID-19 AFFECTING PREGNANCY, ANTEPARTUM: ICD-10-CM

## 2022-10-10 DIAGNOSIS — Z87.59 HISTORY OF PRETERM PREMATURE RUPTURE OF MEMBRANES (PPROM): ICD-10-CM

## 2022-10-10 DIAGNOSIS — Z87.59 HISTORY OF PRECIPITOUS DELIVERY: ICD-10-CM

## 2022-10-10 DIAGNOSIS — Z34.80 SUPERVISION OF OTHER NORMAL PREGNANCY, ANTEPARTUM: Primary | ICD-10-CM

## 2022-10-10 DIAGNOSIS — O98.519 COVID-19 AFFECTING PREGNANCY, ANTEPARTUM: ICD-10-CM

## 2022-10-10 DIAGNOSIS — Z34.80 SUPERVISION OF OTHER NORMAL PREGNANCY, ANTEPARTUM: ICD-10-CM

## 2022-10-10 LAB
ANTIBODY SCREEN: NEGATIVE
BASOPHILS # BLD AUTO: 0.04 X10(3) UL (ref 0–0.2)
BASOPHILS NFR BLD AUTO: 0.6 %
EOSINOPHIL # BLD AUTO: 0.23 X10(3) UL (ref 0–0.7)
EOSINOPHIL NFR BLD AUTO: 3.2 %
ERYTHROCYTE [DISTWIDTH] IN BLOOD BY AUTOMATED COUNT: 13.1 %
GLUCOSE (URINE DIPSTICK): NEGATIVE MG/DL
HBV SURFACE AG SER-ACNC: <0.1 [IU]/L
HBV SURFACE AG SERPL QL IA: NONREACTIVE
HCT VFR BLD AUTO: 29.7 %
HCV AB SERPL QL IA: NONREACTIVE
HGB BLD-MCNC: 10.6 G/DL
IMM GRANULOCYTES # BLD AUTO: 0.02 X10(3) UL (ref 0–1)
IMM GRANULOCYTES NFR BLD: 0.3 %
LYMPHOCYTES # BLD AUTO: 1.82 X10(3) UL (ref 1–4)
LYMPHOCYTES NFR BLD AUTO: 25 %
MCH RBC QN AUTO: 31.8 PG (ref 26–34)
MCHC RBC AUTO-ENTMCNC: 35.7 G/DL (ref 31–37)
MCV RBC AUTO: 89.2 FL
MONOCYTES # BLD AUTO: 0.33 X10(3) UL (ref 0.1–1)
MONOCYTES NFR BLD AUTO: 4.5 %
MULTISTIX LOT#: NORMAL NUMERIC
NEUTROPHILS # BLD AUTO: 4.83 X10 (3) UL (ref 1.5–7.7)
NEUTROPHILS # BLD AUTO: 4.83 X10(3) UL (ref 1.5–7.7)
NEUTROPHILS NFR BLD AUTO: 66.4 %
PLATELET # BLD AUTO: 243 10(3)UL (ref 150–450)
RBC # BLD AUTO: 3.33 X10(6)UL
RH BLOOD TYPE: POSITIVE
RUBV IGG SER QL: POSITIVE
RUBV IGG SER-ACNC: 345.9 IU/ML (ref 10–?)
T PALLIDUM AB SER QL IA: NONREACTIVE
WBC # BLD AUTO: 7.3 X10(3) UL (ref 4–11)

## 2022-10-10 PROCEDURE — 86901 BLOOD TYPING SEROLOGIC RH(D): CPT | Performed by: OBSTETRICS & GYNECOLOGY

## 2022-10-10 PROCEDURE — 86850 RBC ANTIBODY SCREEN: CPT | Performed by: OBSTETRICS & GYNECOLOGY

## 2022-10-10 PROCEDURE — 86780 TREPONEMA PALLIDUM: CPT | Performed by: OBSTETRICS & GYNECOLOGY

## 2022-10-10 PROCEDURE — 86762 RUBELLA ANTIBODY: CPT | Performed by: OBSTETRICS & GYNECOLOGY

## 2022-10-10 PROCEDURE — 87591 N.GONORRHOEAE DNA AMP PROB: CPT | Performed by: OBSTETRICS & GYNECOLOGY

## 2022-10-10 PROCEDURE — 85025 COMPLETE CBC W/AUTO DIFF WBC: CPT | Performed by: OBSTETRICS & GYNECOLOGY

## 2022-10-10 PROCEDURE — 87491 CHLMYD TRACH DNA AMP PROBE: CPT | Performed by: OBSTETRICS & GYNECOLOGY

## 2022-10-10 PROCEDURE — 86900 BLOOD TYPING SEROLOGIC ABO: CPT | Performed by: OBSTETRICS & GYNECOLOGY

## 2022-10-10 PROCEDURE — 87086 URINE CULTURE/COLONY COUNT: CPT | Performed by: OBSTETRICS & GYNECOLOGY

## 2022-10-10 PROCEDURE — 87389 HIV-1 AG W/HIV-1&-2 AB AG IA: CPT | Performed by: OBSTETRICS & GYNECOLOGY

## 2022-10-10 PROCEDURE — 86803 HEPATITIS C AB TEST: CPT | Performed by: OBSTETRICS & GYNECOLOGY

## 2022-10-10 PROCEDURE — 87340 HEPATITIS B SURFACE AG IA: CPT | Performed by: OBSTETRICS & GYNECOLOGY

## 2022-10-11 LAB
C TRACH DNA SPEC QL NAA+PROBE: NEGATIVE
N GONORRHOEA DNA SPEC QL NAA+PROBE: NEGATIVE

## 2022-11-07 ENCOUNTER — ROUTINE PRENATAL (OUTPATIENT)
Dept: OBGYN CLINIC | Facility: CLINIC | Age: 32
End: 2022-11-07
Payer: COMMERCIAL

## 2022-11-07 VITALS
SYSTOLIC BLOOD PRESSURE: 112 MMHG | DIASTOLIC BLOOD PRESSURE: 72 MMHG | WEIGHT: 141.5 LBS | HEIGHT: 63 IN | BODY MASS INDEX: 25.07 KG/M2

## 2022-11-07 DIAGNOSIS — Z3A.18 18 WEEKS GESTATION OF PREGNANCY: Primary | ICD-10-CM

## 2022-11-07 NOTE — PROGRESS NOTES
Patient presents with:  Prenatal Care: BRIGIDO--Pt. States she has been having light cramping after sex. And she has been feeling light headed and dizzy. Routine prenatal visit without complaints. Patient denies any bleeding, leaking fluid, cramping, or contractions. Good fetal movement. Assessment/Plan:  18w2d doing well  Level 2 scheduled for 22. Declines all other optional prenatal screening tests including cfdna, NT, genetic carrier screening, AFP/Quad, amnio/CVS.   Reviewed signs and symptoms of  labor  Diagnoses and all orders for this visit:    18 weeks gestation of pregnancy  -     URINALYSIS NONAUTO W/O SCOPE (OB URISTIX)       Return in about 4 weeks (around 2022) for Routine Prenatal Visit.

## 2022-11-21 ENCOUNTER — ULTRASOUND ENCOUNTER (OUTPATIENT)
Dept: PERINATAL CARE | Facility: HOSPITAL | Age: 32
End: 2022-11-21
Attending: OBSTETRICS & GYNECOLOGY
Payer: COMMERCIAL

## 2022-11-21 VITALS
WEIGHT: 144 LBS | DIASTOLIC BLOOD PRESSURE: 62 MMHG | HEIGHT: 63 IN | SYSTOLIC BLOOD PRESSURE: 99 MMHG | BODY MASS INDEX: 25.52 KG/M2 | HEART RATE: 71 BPM

## 2022-11-21 DIAGNOSIS — O98.519 COVID-19 AFFECTING PREGNANCY, ANTEPARTUM: ICD-10-CM

## 2022-11-21 DIAGNOSIS — U07.1 COVID-19 AFFECTING PREGNANCY, ANTEPARTUM: ICD-10-CM

## 2022-11-21 DIAGNOSIS — Z87.59 HISTORY OF PRECIPITOUS DELIVERY: ICD-10-CM

## 2022-11-21 DIAGNOSIS — Z87.59 HISTORY OF PRETERM PREMATURE RUPTURE OF MEMBRANES (PPROM): ICD-10-CM

## 2022-11-21 DIAGNOSIS — Z87.59 HISTORY OF PRETERM PREMATURE RUPTURE OF MEMBRANES (PPROM): Primary | ICD-10-CM

## 2022-11-21 DIAGNOSIS — G43.809 VESTIBULAR MIGRAINE: ICD-10-CM

## 2022-11-21 PROCEDURE — 76817 TRANSVAGINAL US OBSTETRIC: CPT

## 2022-11-21 PROCEDURE — 76811 OB US DETAILED SNGL FETUS: CPT | Performed by: OBSTETRICS & GYNECOLOGY

## 2022-12-05 ENCOUNTER — ROUTINE PRENATAL (OUTPATIENT)
Dept: OBGYN CLINIC | Facility: CLINIC | Age: 32
End: 2022-12-05
Payer: COMMERCIAL

## 2022-12-05 VITALS
SYSTOLIC BLOOD PRESSURE: 110 MMHG | WEIGHT: 144 LBS | HEART RATE: 71 BPM | DIASTOLIC BLOOD PRESSURE: 50 MMHG | BODY MASS INDEX: 26 KG/M2

## 2022-12-05 DIAGNOSIS — Z34.80 PRENATAL CARE OF MULTIGRAVIDA, ANTEPARTUM: Primary | ICD-10-CM

## 2022-12-05 DIAGNOSIS — Z34.80 SUPERVISION OF OTHER NORMAL PREGNANCY, ANTEPARTUM: ICD-10-CM

## 2022-12-05 NOTE — PROGRESS NOTES
BRIGIDO  Doing well, no concerns or questions  FM is fine  RH positive  S/P Anatomy scan with MFM- see recommendations  1 hr glucose/ CBC ordered  RTC q4wks

## 2022-12-21 ENCOUNTER — PATIENT MESSAGE (OUTPATIENT)
Dept: INTERNAL MEDICINE CLINIC | Facility: CLINIC | Age: 32
End: 2022-12-21

## 2022-12-21 DIAGNOSIS — T14.8XXA BRUISING: Primary | ICD-10-CM
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
## 2022-12-21 NOTE — TELEPHONE ENCOUNTER
Called pt she states she will be at work all day until 5. Pt states she is off tomorrow if she can come in.

## 2022-12-21 NOTE — TELEPHONE ENCOUNTER
[facility] office, I think this needs to be seen and evaluated in person. Dr. Luis Alfredo Agee last mcm said she could be seen next week.  Ty!

## 2022-12-21 NOTE — TELEPHONE ENCOUNTER
Patient calling in to schedule appointment. Would video visit be okay on Friday? Patient is okay with doing a video visit anytime on Friday. Patient unable to come in tomorrow afternoon. She will be getting her labs drawn tomorrow morning. She's okay with any provider.

## 2022-12-22 ENCOUNTER — TELEMEDICINE (OUTPATIENT)
Dept: INTERNAL MEDICINE CLINIC | Facility: CLINIC | Age: 32
End: 2022-12-22
Payer: COMMERCIAL

## 2022-12-22 ENCOUNTER — LAB ENCOUNTER (OUTPATIENT)
Dept: LAB | Age: 32
End: 2022-12-22
Attending: INTERNAL MEDICINE
Payer: COMMERCIAL

## 2022-12-22 DIAGNOSIS — T14.8XXA BRUISING: Primary | ICD-10-CM

## 2022-12-22 DIAGNOSIS — T14.8XXA BRUISING: ICD-10-CM

## 2022-12-22 DIAGNOSIS — D64.9 ANEMIA, UNSPECIFIED TYPE: Primary | ICD-10-CM

## 2022-12-22 LAB
BASOPHILS # BLD AUTO: 0.04 X10(3) UL (ref 0–0.2)
BASOPHILS NFR BLD AUTO: 0.7 %
EOSINOPHIL # BLD AUTO: 0.13 X10(3) UL (ref 0–0.7)
EOSINOPHIL NFR BLD AUTO: 2.2 %
ERYTHROCYTE [DISTWIDTH] IN BLOOD BY AUTOMATED COUNT: 12.9 %
HCT VFR BLD AUTO: 30.4 %
HGB BLD-MCNC: 10.3 G/DL
IMM GRANULOCYTES # BLD AUTO: 0.02 X10(3) UL (ref 0–1)
IMM GRANULOCYTES NFR BLD: 0.3 %
LYMPHOCYTES # BLD AUTO: 1.62 X10(3) UL (ref 1–4)
LYMPHOCYTES NFR BLD AUTO: 28 %
MCH RBC QN AUTO: 32.5 PG (ref 26–34)
MCHC RBC AUTO-ENTMCNC: 33.9 G/DL (ref 31–37)
MCV RBC AUTO: 95.9 FL
MONOCYTES # BLD AUTO: 0.29 X10(3) UL (ref 0.1–1)
MONOCYTES NFR BLD AUTO: 5 %
NEUTROPHILS # BLD AUTO: 3.68 X10 (3) UL (ref 1.5–7.7)
NEUTROPHILS # BLD AUTO: 3.68 X10(3) UL (ref 1.5–7.7)
NEUTROPHILS NFR BLD AUTO: 63.8 %
PLATELET # BLD AUTO: 186 10(3)UL (ref 150–450)
RBC # BLD AUTO: 3.17 X10(6)UL
WBC # BLD AUTO: 5.8 X10(3) UL (ref 4–11)

## 2022-12-22 PROCEDURE — 85025 COMPLETE CBC W/AUTO DIFF WBC: CPT | Performed by: INTERNAL MEDICINE

## 2022-12-22 PROCEDURE — 99213 OFFICE O/P EST LOW 20 MIN: CPT | Performed by: INTERNAL MEDICINE

## 2022-12-22 NOTE — TELEPHONE ENCOUNTER
Pt came into the office wanting to know if she can do a VV tomorrow because she will not be available next week to come in the office next week. There is a hold on 8am tomorrow for pt if it is ok to schedule. Can we add pt on at 8am for a VV Friday ?

## 2022-12-22 NOTE — TELEPHONE ENCOUNTER
Future Appointments   Date Time Provider Steve De La Cruz   12/22/2022  1:30 PM Judith Dance, MD [facility] 8 [facility] [place]   12/30/2022  7:30 AM REFERENCE [id number]   1/6/2023  8:45 AM Rosalinda Pearce MD [facility] OB[facility] P [facility] [address]   2/6/2023  1:30 PM AVELINA Barragan [facility] OB[facility] N [facility] [place]   2/13/2023  2:00 PM [facility] [id number]   2/20/2023  1:15 PM Rosalinda Pearce MD [facility] OB/GY[facility] P [facility] [address]

## 2022-12-30 ENCOUNTER — LAB ENCOUNTER (OUTPATIENT)
Dept: LAB | Age: 32
End: 2022-12-30
Attending: NURSE PRACTITIONER
Payer: COMMERCIAL

## 2022-12-30 DIAGNOSIS — Z34.80 SUPERVISION OF OTHER NORMAL PREGNANCY, ANTEPARTUM: ICD-10-CM

## 2022-12-30 LAB
BASOPHILS # BLD AUTO: 0.06 X10(3) UL (ref 0–0.2)
BASOPHILS NFR BLD AUTO: 0.9 %
DEPRECATED HBV CORE AB SER IA-ACNC: 10.9 NG/ML
EOSINOPHIL # BLD AUTO: 0.16 X10(3) UL (ref 0–0.7)
EOSINOPHIL NFR BLD AUTO: 2.5 %
ERYTHROCYTE [DISTWIDTH] IN BLOOD BY AUTOMATED COUNT: 12.9 %
GLUCOSE 1H P GLC SERPL-MCNC: 157 MG/DL
HCT VFR BLD AUTO: 29.6 %
HGB BLD-MCNC: 9.9 G/DL
IMM GRANULOCYTES # BLD AUTO: 0.02 X10(3) UL (ref 0–1)
IMM GRANULOCYTES NFR BLD: 0.3 %
LYMPHOCYTES # BLD AUTO: 1.55 X10(3) UL (ref 1–4)
LYMPHOCYTES NFR BLD AUTO: 24.4 %
MCH RBC QN AUTO: 32.5 PG (ref 26–34)
MCHC RBC AUTO-ENTMCNC: 33.4 G/DL (ref 31–37)
MCV RBC AUTO: 97 FL
MONOCYTES # BLD AUTO: 0.31 X10(3) UL (ref 0.1–1)
MONOCYTES NFR BLD AUTO: 4.9 %
NEUTROPHILS # BLD AUTO: 4.24 X10 (3) UL (ref 1.5–7.7)
NEUTROPHILS # BLD AUTO: 4.24 X10(3) UL (ref 1.5–7.7)
NEUTROPHILS NFR BLD AUTO: 67 %
PLATELET # BLD AUTO: 211 10(3)UL (ref 150–450)
RBC # BLD AUTO: 3.05 X10(6)UL
WBC # BLD AUTO: 6.3 X10(3) UL (ref 4–11)

## 2022-12-30 PROCEDURE — 82728 ASSAY OF FERRITIN: CPT | Performed by: NURSE PRACTITIONER

## 2022-12-30 PROCEDURE — 85025 COMPLETE CBC W/AUTO DIFF WBC: CPT | Performed by: NURSE PRACTITIONER

## 2022-12-30 PROCEDURE — 82950 GLUCOSE TEST: CPT | Performed by: NURSE PRACTITIONER

## 2023-01-03 ENCOUNTER — TELEPHONE (OUTPATIENT)
Dept: OBGYN CLINIC | Facility: CLINIC | Age: 33
End: 2023-01-03

## 2023-01-03 PROBLEM — O99.012 ANEMIA DURING PREGNANCY IN SECOND TRIMESTER: Status: ACTIVE | Noted: 2023-01-03

## 2023-01-03 PROBLEM — R73.09 ELEVATED GLUCOSE TOLERANCE TEST: Status: ACTIVE | Noted: 2023-01-03

## 2023-01-03 PROBLEM — O99.012 ANEMIA DURING PREGNANCY IN SECOND TRIMESTER (HCC): Status: ACTIVE | Noted: 2023-01-03

## 2023-01-06 ENCOUNTER — ROUTINE PRENATAL (OUTPATIENT)
Dept: OBGYN CLINIC | Facility: CLINIC | Age: 33
End: 2023-01-06
Payer: COMMERCIAL

## 2023-01-06 VITALS
WEIGHT: 146.5 LBS | DIASTOLIC BLOOD PRESSURE: 62 MMHG | HEIGHT: 63 IN | HEART RATE: 91 BPM | SYSTOLIC BLOOD PRESSURE: 110 MMHG | BODY MASS INDEX: 25.96 KG/M2

## 2023-01-06 DIAGNOSIS — Z36.9 ANTENATAL SCREENING ENCOUNTER: ICD-10-CM

## 2023-01-06 DIAGNOSIS — Z3A.26 26 WEEKS GESTATION OF PREGNANCY: Primary | ICD-10-CM

## 2023-01-06 PROCEDURE — 81002 URINALYSIS NONAUTO W/O SCOPE: CPT | Performed by: OBSTETRICS & GYNECOLOGY

## 2023-01-06 PROCEDURE — 3074F SYST BP LT 130 MM HG: CPT | Performed by: OBSTETRICS & GYNECOLOGY

## 2023-01-06 PROCEDURE — 3078F DIAST BP <80 MM HG: CPT | Performed by: OBSTETRICS & GYNECOLOGY

## 2023-01-06 PROCEDURE — 3008F BODY MASS INDEX DOCD: CPT | Performed by: OBSTETRICS & GYNECOLOGY

## 2023-01-06 RX ORDER — IRON,CARBONYL/ASCORBIC ACID 100-250 MG
TABLET ORAL
COMMUNITY

## 2023-01-06 NOTE — PROGRESS NOTES
Patient presents with:  Prenatal Care: BRIGIDO--Pt. States she is concerned about bruising on her legs, leg cramps, bloodwork, and malou gill. Routine prenatal visit. Patient without complaints. Good fetal movement  Patient denies any bleeding, leaking fluid, cramping, or contractions. SVE: cl/th/high  Ext- varicosities/spider veins back of right calf, non-tender  Assessment/Plan:  26w6d doing well   contractions- Reviewed  labor signs and symptoms. 3 hour GTT, follow up CBC and HIV ordered. Depression screen reviewed. Blood type A pos  Hx of Covid- growth scan [facility] 23    Diagnoses and all orders for this visit:    26 weeks gestation of pregnancy  -     URINALYSIS NONAUTO W/O SCOPE (OB URISTIX)     screening encounter  -     HIV AG AB COMBO; Future        Return in about 4 weeks (around 2/3/2023) for Routine Prenatal Visit.

## 2023-01-09 ENCOUNTER — LAB ENCOUNTER (OUTPATIENT)
Dept: LAB | Age: 33
End: 2023-01-09
Attending: NURSE PRACTITIONER
Payer: COMMERCIAL

## 2023-01-09 DIAGNOSIS — R73.09 ELEVATED GLUCOSE TOLERANCE TEST: ICD-10-CM

## 2023-01-09 DIAGNOSIS — Z36.9 ANTENATAL SCREENING ENCOUNTER: ICD-10-CM

## 2023-01-09 LAB
GLUCOSE 1H P GLC SERPL-MCNC: 111 MG/DL
GLUCOSE 2H P GLC SERPL-MCNC: 96 MG/DL
GLUCOSE 3H P GLC SERPL-MCNC: 94 MG/DL (ref 70–140)
GLUCOSE P FAST SERPL-MCNC: 81 MG/DL

## 2023-01-09 PROCEDURE — 87389 HIV-1 AG W/HIV-1&-2 AB AG IA: CPT | Performed by: OBSTETRICS & GYNECOLOGY

## 2023-01-09 PROCEDURE — 82951 GLUCOSE TOLERANCE TEST (GTT): CPT | Performed by: NURSE PRACTITIONER

## 2023-01-09 PROCEDURE — 82952 GTT-ADDED SAMPLES: CPT | Performed by: NURSE PRACTITIONER

## 2023-01-23 ENCOUNTER — ROUTINE PRENATAL (OUTPATIENT)
Dept: OBGYN CLINIC | Facility: CLINIC | Age: 33
End: 2023-01-23
Payer: COMMERCIAL

## 2023-01-23 VITALS — DIASTOLIC BLOOD PRESSURE: 56 MMHG | SYSTOLIC BLOOD PRESSURE: 102 MMHG | WEIGHT: 149.5 LBS | BODY MASS INDEX: 26 KG/M2

## 2023-01-23 DIAGNOSIS — Z3A.29 29 WEEKS GESTATION OF PREGNANCY: ICD-10-CM

## 2023-01-23 DIAGNOSIS — Z87.59 HISTORY OF PRETERM PREMATURE RUPTURE OF MEMBRANES (PPROM): Primary | ICD-10-CM

## 2023-01-23 NOTE — PATIENT INSTRUCTIONS
[address] [employer] has had significant outbreaks of pertussis (whooping cough) for the last few years. Therefore, the CDC recommends that all pregnant women receive a Tdap vaccine during pregnancy, regardless of whether you have received one previously. The vaccine reduces your chances of bertha the illness, and also provides some natural immunity to your baby for possible exposures after birth. The best time to get the vaccine is between 27 and 36 weeks. Baby caregivers (grandparents, spouse) should also receive the vaccine. Influenza- Pregnant women who develop the flu are more prone to serious complications that can affect both mother and baby. The CDC recommends that all women who will be pregnant during flu season (October to May) receive the flu vaccine (injection only, not nasal spray). The vaccine can be given during any stage of pregnancy. Both vaccines are offered in our office, as well as through many pharmacies and primary care offices. Pregnancy/Nursing and Covid-19 Vaccine   As the [facility]  begins to make progress in administering the Covid-19 vaccine, we understand that our pregnant and breastfeeding patients will have questions about the safety of the Covid-19 vaccine. Keep in mind that information is evolving rapidly and that recommendations can change over time. The CDC, the Society for Maternal Fetal Medicine, and the [employer] of Obstetrics & Gynecology now recommend that all pregnant and breastfeeding women consider getting the Covid-19 vaccine, in consultation with their healthcare provider. Factors to consider include the available safety data, risks to pregnant women from Covid-19 infection, and your individual risk for infection and severe disease, based on your risk of exposure and any other medical risk factors that you may have.   A recent study of 36,000 pregnant women confirmed the safety and effectiveness of the vaccine, with no increase risk of miscarriage. Here are some facts to consider when you are making a decision:   [1] Pregnant women are at higher risk for acquiring Covid-19 infection and have a subsequent higher risk for the following:  - severe illness  - adverse pregnancy outcomes including  birth  - hospitalization, ICU admission, need for mechanical ventilation and death  [2] The mRNA vaccines that are currently available do not contain live virus, do not enter the nucleus, do not cause genetic changes, do not alter human DNA, and cannot cause Covid-19 illness. [3] mRNA breaks down quickly, so it's unlikely that any of the mRNA in the vaccine would be able to get into your breast milk or into your baby through the placenta. [4] The antibodies that your body produces in response to the vaccine will be passed to your baby through the placenta and provide some protection for your  baby against Covid-19 infection. [5] Whether you ultimately decide to receive the vaccine or not, do not forget to continue with other preventative measures including frequent hand washing, avoiding crowds, physical distancing and wearing a mask.

## 2023-01-23 NOTE — PROGRESS NOTES
BRIGIDO 29w2d    Doing ok, +FM  Intermittent contractions but not increased from prior, just more aware now  no LOF, VB  Has right sided varicosity on the posterior aspect of her right calf. Stable now    Right calf there is a constellation of varicosities on the posterior aspect of her right calf that is compressible, no evidence of clot noted. 1. FHT-prsent  2. PNL:  HIV neg  3. Mode of delivery:  anticipated  4. COVID during pregnancy, history of PPROM with subsequent FT : has f/u growth US with MFM, normal level 2   5. Immunizations: discussed TDAP, she accepts today  6.  Reviewed DVT precaution s--recommend compression stockings    Return in 2 weeks

## 2023-01-27 ENCOUNTER — LAB ENCOUNTER (OUTPATIENT)
Dept: LAB | Age: 33
End: 2023-01-27
Attending: NURSE PRACTITIONER
Payer: COMMERCIAL

## 2023-01-27 DIAGNOSIS — D64.9 ANEMIA, UNSPECIFIED TYPE: ICD-10-CM

## 2023-01-27 DIAGNOSIS — O99.012 ANEMIA DURING PREGNANCY IN SECOND TRIMESTER: ICD-10-CM

## 2023-01-27 LAB
BASOPHILS # BLD AUTO: 0.04 X10(3) UL (ref 0–0.2)
BASOPHILS NFR BLD AUTO: 0.6 %
DEPRECATED HBV CORE AB SER IA-ACNC: 12.5 NG/ML
EOSINOPHIL # BLD AUTO: 0.15 X10(3) UL (ref 0–0.7)
EOSINOPHIL NFR BLD AUTO: 2.4 %
ERYTHROCYTE [DISTWIDTH] IN BLOOD BY AUTOMATED COUNT: 12.9 %
HCT VFR BLD AUTO: 32.7 %
HGB BLD-MCNC: 11 G/DL
IMM GRANULOCYTES # BLD AUTO: 0.04 X10(3) UL (ref 0–1)
IMM GRANULOCYTES NFR BLD: 0.6 %
IRON SATN MFR SERPL: 20 %
IRON SERPL-MCNC: 90 UG/DL
LYMPHOCYTES # BLD AUTO: 1.52 X10(3) UL (ref 1–4)
LYMPHOCYTES NFR BLD AUTO: 24.1 %
MCH RBC QN AUTO: 32.3 PG (ref 26–34)
MCHC RBC AUTO-ENTMCNC: 33.6 G/DL (ref 31–37)
MCV RBC AUTO: 95.9 FL
MONOCYTES # BLD AUTO: 0.45 X10(3) UL (ref 0.1–1)
MONOCYTES NFR BLD AUTO: 7.1 %
NEUTROPHILS # BLD AUTO: 4.1 X10 (3) UL (ref 1.5–7.7)
NEUTROPHILS # BLD AUTO: 4.1 X10(3) UL (ref 1.5–7.7)
NEUTROPHILS NFR BLD AUTO: 65.2 %
PLATELET # BLD AUTO: 193 10(3)UL (ref 150–450)
RBC # BLD AUTO: 3.41 X10(6)UL
TIBC SERPL-MCNC: 457 UG/DL (ref 240–450)
TRANSFERRIN SERPL-MCNC: 307 MG/DL (ref 200–360)
VIT B12 SERPL-MCNC: 317 PG/ML (ref 193–986)
WBC # BLD AUTO: 6.3 X10(3) UL (ref 4–11)

## 2023-01-27 PROCEDURE — 82607 VITAMIN B-12: CPT

## 2023-01-27 PROCEDURE — 85025 COMPLETE CBC W/AUTO DIFF WBC: CPT

## 2023-01-27 PROCEDURE — 36415 COLL VENOUS BLD VENIPUNCTURE: CPT

## 2023-01-27 PROCEDURE — 83550 IRON BINDING TEST: CPT

## 2023-01-27 PROCEDURE — 82728 ASSAY OF FERRITIN: CPT

## 2023-01-27 PROCEDURE — 83540 ASSAY OF IRON: CPT

## 2023-02-06 ENCOUNTER — ROUTINE PRENATAL (OUTPATIENT)
Dept: OBGYN CLINIC | Facility: CLINIC | Age: 33
End: 2023-02-06
Payer: COMMERCIAL

## 2023-02-06 VITALS — WEIGHT: 149.38 LBS | DIASTOLIC BLOOD PRESSURE: 58 MMHG | SYSTOLIC BLOOD PRESSURE: 92 MMHG | BODY MASS INDEX: 26 KG/M2

## 2023-02-06 DIAGNOSIS — Z34.80 PRENATAL CARE OF MULTIGRAVIDA, ANTEPARTUM: Primary | ICD-10-CM

## 2023-02-06 DIAGNOSIS — Z34.80 SUPERVISION OF OTHER NORMAL PREGNANCY, ANTEPARTUM: ICD-10-CM

## 2023-02-06 RX ORDER — COVID-19 ANTIGEN TEST
KIT MISCELLANEOUS
COMMUNITY
Start: 2023-02-02

## 2023-02-06 NOTE — PROGRESS NOTES
BRIGIDO  Doing well, fatigued with few aches and pains  GOOD FM  Denies VB/LOF/uctx  RTC in 2 wks  Fetal movement discussed

## 2023-02-13 ENCOUNTER — ULTRASOUND ENCOUNTER (OUTPATIENT)
Dept: PERINATAL CARE | Facility: HOSPITAL | Age: 33
End: 2023-02-13
Attending: OBSTETRICS & GYNECOLOGY
Payer: COMMERCIAL

## 2023-02-13 VITALS
DIASTOLIC BLOOD PRESSURE: 65 MMHG | SYSTOLIC BLOOD PRESSURE: 101 MMHG | HEIGHT: 63 IN | HEART RATE: 70 BPM | WEIGHT: 151 LBS | BODY MASS INDEX: 26.75 KG/M2

## 2023-02-13 DIAGNOSIS — Z87.59 HISTORY OF PRETERM PREMATURE RUPTURE OF MEMBRANES (PPROM): ICD-10-CM

## 2023-02-13 DIAGNOSIS — O98.519 COVID-19 AFFECTING PREGNANCY, ANTEPARTUM: ICD-10-CM

## 2023-02-13 DIAGNOSIS — O98.519 COVID-19 AFFECTING PREGNANCY, ANTEPARTUM: Primary | ICD-10-CM

## 2023-02-13 DIAGNOSIS — U07.1 COVID-19 AFFECTING PREGNANCY, ANTEPARTUM: ICD-10-CM

## 2023-02-13 DIAGNOSIS — U07.1 COVID-19 AFFECTING PREGNANCY, ANTEPARTUM: Primary | ICD-10-CM

## 2023-02-13 PROCEDURE — 76816 OB US FOLLOW-UP PER FETUS: CPT | Performed by: OBSTETRICS & GYNECOLOGY

## 2023-02-13 PROCEDURE — 76818 FETAL BIOPHYS PROFILE W/NST: CPT

## 2023-02-13 NOTE — PROGRESS NOTES
Pt here for Growth Ultrasound  +fm noted per patient  Pt feels occas mild uc's. Pt denies srom, bleeding, pain. Abd soft, nontender. Pt denies further complaints.

## 2023-02-15 ENCOUNTER — MOBILE ENCOUNTER (OUTPATIENT)
Dept: OBGYN CLINIC | Facility: CLINIC | Age: 33
End: 2023-02-15

## 2023-02-15 ENCOUNTER — APPOINTMENT (OUTPATIENT)
Dept: ULTRASOUND IMAGING | Facility: HOSPITAL | Age: 33
End: 2023-02-15
Attending: OBSTETRICS & GYNECOLOGY
Payer: COMMERCIAL

## 2023-02-15 ENCOUNTER — HOSPITAL ENCOUNTER (OUTPATIENT)
Facility: HOSPITAL | Age: 33
Setting detail: OBSERVATION
Discharge: HOME | End: 2023-02-16
Attending: OBSTETRICS & GYNECOLOGY | Admitting: OBSTETRICS & GYNECOLOGY
Payer: COMMERCIAL

## 2023-02-15 PROBLEM — Z34.90 PREGNANCY: Status: ACTIVE | Noted: 2023-02-15

## 2023-02-15 PROBLEM — Z34.90 PREGNANCY (HCC): Status: ACTIVE | Noted: 2023-02-15

## 2023-02-15 LAB
ANTIBODY SCREEN: NEGATIVE
APTT PPP: 24.8 SECONDS (ref 23.3–35.6)
BASOPHILS # BLD AUTO: 0.03 X10(3) UL (ref 0–0.2)
BASOPHILS NFR BLD AUTO: 0.3 %
BILIRUBIN URINE: NEGATIVE
BLOOD URINE: NEGATIVE
CONTROL RUN WITHIN 24 HOURS?: YES
EOSINOPHIL # BLD AUTO: 0.1 X10(3) UL (ref 0–0.7)
EOSINOPHIL NFR BLD AUTO: 1.1 %
ERYTHROCYTE [DISTWIDTH] IN BLOOD BY AUTOMATED COUNT: 12.7 %
FIBRINOGEN PPP-MCNC: 356 MG/DL (ref 180–480)
FIBRONECTIN FETAL VAG QL: NEGATIVE
GLUCOSE URINE: NEGATIVE
HCT VFR BLD AUTO: 31.9 %
HGB BLD-MCNC: 11.2 G/DL
IMM GRANULOCYTES # BLD AUTO: 0.07 X10(3) UL (ref 0–1)
IMM GRANULOCYTES NFR BLD: 0.8 %
INR BLD: 1.01 (ref 0.85–1.16)
KETONE URINE: 80
LEUKOCYTE ESTERASE URINE: NEGATIVE
LYMPHOCYTES # BLD AUTO: 1.67 X10(3) UL (ref 1–4)
LYMPHOCYTES NFR BLD AUTO: 18.2 %
MCH RBC QN AUTO: 32.9 PG (ref 26–34)
MCHC RBC AUTO-ENTMCNC: 35.1 G/DL (ref 31–37)
MCV RBC AUTO: 93.8 FL
MONOCYTES # BLD AUTO: 0.49 X10(3) UL (ref 0.1–1)
MONOCYTES NFR BLD AUTO: 5.3 %
NEUTROPHILS # BLD AUTO: 6.81 X10 (3) UL (ref 1.5–7.7)
NEUTROPHILS # BLD AUTO: 6.81 X10(3) UL (ref 1.5–7.7)
NEUTROPHILS NFR BLD AUTO: 74.3 %
NITRITE URINE: NEGATIVE
PH URINE: 6 (ref 5–8)
PLATELET # BLD AUTO: 170 10(3)UL (ref 150–450)
PROTEIN URINE: NEGATIVE
PROTHROMBIN TIME: 13.3 SECONDS (ref 11.6–14.8)
RBC # BLD AUTO: 3.4 X10(6)UL
RH BLOOD TYPE: POSITIVE
SARS-COV-2 RNA RESP QL NAA+PROBE: NOT DETECTED
SPEC GRAVITY: 1.02 (ref 1–1.03)
URINE CLARITY: CLEAR
URINE COLOR: YELLOW
UROBILINOGEN URINE: 1
WBC # BLD AUTO: 9.2 X10(3) UL (ref 4–11)

## 2023-02-15 PROCEDURE — 76815 OB US LIMITED FETUS(S): CPT | Performed by: OBSTETRICS & GYNECOLOGY

## 2023-02-15 RX ORDER — ACETAMINOPHEN 500 MG
1000 TABLET ORAL EVERY 6 HOURS PRN
Status: DISCONTINUED | OUTPATIENT
Start: 2023-02-15 | End: 2023-02-16

## 2023-02-15 RX ORDER — ACETAMINOPHEN 500 MG
500 TABLET ORAL EVERY 6 HOURS PRN
Status: DISCONTINUED | OUTPATIENT
Start: 2023-02-15 | End: 2023-02-16

## 2023-02-15 RX ORDER — BETAMETHASONE SODIUM PHOSPHATE AND BETAMETHASONE ACETATE 3; 3 MG/ML; MG/ML
12 INJECTION, SUSPENSION INTRA-ARTICULAR; INTRALESIONAL; INTRAMUSCULAR; SOFT TISSUE EVERY 24 HOURS
Status: DISCONTINUED | OUTPATIENT
Start: 2023-02-15 | End: 2023-02-16

## 2023-02-15 RX ORDER — CALCIUM CARBONATE 200(500)MG
1000 TABLET,CHEWABLE ORAL
Status: DISCONTINUED | OUTPATIENT
Start: 2023-02-15 | End: 2023-02-16

## 2023-02-15 RX ORDER — BETAMETHASONE SODIUM PHOSPHATE AND BETAMETHASONE ACETATE 3; 3 MG/ML; MG/ML
INJECTION, SUSPENSION INTRA-ARTICULAR; INTRALESIONAL; INTRAMUSCULAR; SOFT TISSUE
Status: COMPLETED
Start: 2023-02-15 | End: 2023-02-15

## 2023-02-15 RX ORDER — SODIUM CHLORIDE, SODIUM LACTATE, POTASSIUM CHLORIDE, CALCIUM CHLORIDE 600; 310; 30; 20 MG/100ML; MG/100ML; MG/100ML; MG/100ML
INJECTION, SOLUTION INTRAVENOUS CONTINUOUS
Status: DISCONTINUED | OUTPATIENT
Start: 2023-02-15 | End: 2023-02-16

## 2023-02-15 RX ORDER — DOCUSATE SODIUM 100 MG/1
100 CAPSULE, LIQUID FILLED ORAL 2 TIMES DAILY
Status: DISCONTINUED | OUTPATIENT
Start: 2023-02-15 | End: 2023-02-16

## 2023-02-16 ENCOUNTER — APPOINTMENT (OUTPATIENT)
Dept: OBGYN CLINIC | Facility: HOSPITAL | Age: 33
End: 2023-02-16
Payer: COMMERCIAL

## 2023-02-16 ENCOUNTER — HOSPITAL ENCOUNTER (OUTPATIENT)
Facility: HOSPITAL | Age: 33
Discharge: HOME | End: 2023-02-16
Attending: OBSTETRICS & GYNECOLOGY | Admitting: OBSTETRICS & GYNECOLOGY
Payer: COMMERCIAL

## 2023-02-16 VITALS
HEART RATE: 67 BPM | HEIGHT: 63 IN | WEIGHT: 151 LBS | BODY MASS INDEX: 26.75 KG/M2 | RESPIRATION RATE: 14 BRPM | DIASTOLIC BLOOD PRESSURE: 57 MMHG | TEMPERATURE: 98 F | SYSTOLIC BLOOD PRESSURE: 109 MMHG

## 2023-02-16 LAB — KLEIHAUER BETKE RESULT: NEGATIVE

## 2023-02-16 PROCEDURE — 99234 HOSP IP/OBS SM DT SF/LOW 45: CPT | Performed by: OBSTETRICS & GYNECOLOGY

## 2023-02-16 PROCEDURE — 96372 THER/PROPH/DIAG INJ SC/IM: CPT

## 2023-02-16 RX ORDER — BETAMETHASONE SODIUM PHOSPHATE AND BETAMETHASONE ACETATE 3; 3 MG/ML; MG/ML
12 INJECTION, SUSPENSION INTRA-ARTICULAR; INTRALESIONAL; INTRAMUSCULAR; SOFT TISSUE EVERY 24 HOURS
Status: DISCONTINUED | OUTPATIENT
Start: 2023-02-16 | End: 2023-02-16

## 2023-02-16 RX ORDER — BETAMETHASONE SODIUM PHOSPHATE AND BETAMETHASONE ACETATE 3; 3 MG/ML; MG/ML
INJECTION, SUSPENSION INTRA-ARTICULAR; INTRALESIONAL; INTRAMUSCULAR; SOFT TISSUE
Status: COMPLETED
Start: 2023-02-16 | End: 2023-02-16

## 2023-02-16 NOTE — PROGRESS NOTES
Discharge instructions discussed with pt. Pt instructed to return to L&D for 2nd Betamethasone injection tonight 02/16/23 at 2130. All questions answered at this time. Pt left unit via wheelchair in stable condition, with all personal belongings, accompanied by staff member.

## 2023-02-16 NOTE — PROGRESS NOTES
Returned patient's page. Patient reports painful uterine contractions every 2 hours. Patient states contractions were more intense prior bowel movement but continues to be persistent. Denies leakage of fluid or vaginal bleeding. Reports good fetal movement. Patient has a history of  delivery. Patient advised to report to labor and delivery for further evaluation. Patient agreeable. All questions and concerns were addressed.      Polo Najera MD   [facility] - OBGYN

## 2023-02-16 NOTE — PLAN OF CARE
Problem: ANTEPARTUM/LABOR and DELIVERY  Goal: Maintain pregnancy as long as maternal and/or fetal condition is stable  Description: INTERVENTIONS:  - Maternal surveillance  - Fetal surveillance  - Monitor uterine activity  - Medications as ordered  - Bedrest  Outcome: Completed  Goal: Anxiety is at manageable level  Description: INTERVENTIONS:  - [place] patient to unit/surroundings  - Establish a trusting relationship with patient  - Discuss possible complications or alterations in birth plan  - Explain treatment plan  - Explain testing/procedures prior to initiation  - Encourage participation in care  - Encourage verbalization of concerns/fears  - Identify coping mechanisms  - Administer/offer alternative therapies (Aroma therapy, distraction, guided imagery, massage, music therapy, therapeutic touch)  - Manage patient's environment (Avoid overstimulation of patient)  Outcome: Completed  Goal: Demonstrates ability to cope with hospitalization/illness  Description: INTERVENTIONS:  - Encourage verbalization of feelings/concerns/expectations  - Provide quiet environment  - Assist patient to identify own strengths and abilities  - Encourage patient to set small goals for self  - Encourage participation in diversional activity  - Reinforce positive adaptation of new coping behaviors  - Include patient/family/caregiver in decisions  Outcome: Completed     Problem: Patient/Family Goals  Goal: Patient/Family Long Term Goal  Description: Patient's Long Term Goal: safe delivery of infant      - See additional Care Plan goals for specific interventions  Outcome: Completed  Goal: Patient/Family Short Term Goal  Description: Patient's Short Term Goal: adequate pain management      - See additional Care Plan goals for specific interventions  Outcome: Completed

## 2023-02-16 NOTE — DISCHARGE SUMMARY
[facility]  Discharge Summary    Jessica Kinsey Patient Status:  inpatient    1990 MRN [id number]   Location 1108/1108-A Attending [facility] OBGYN   Hosp Day # 0 PCP Roland Conner MD     Date of Admission: 2/15/2023    Date of Discharge: 23    Admitting Diagnosis: PREGNANCY  Pregnancy    Discharge Diagnosis: s/p     Hospital Course: The patient is a 27year old female now  who was admitted on 02/15/23 for rule out  labor. SVE unchanged after observation. Labs reviewed and overall reassuring. FHT overall reassuring. BMTZ x 1 dose given. Discharged to home. Advised to return to for second dose of BMTZ. Precautions provided. Consultations: none    Complications: none    Discharge Condition: Stable    Discharge Medications: Current Discharge Medication List       Medication List      CONTINUE taking these medications    Iron-Vitamin C 100-250 MG Tabs     Multi Prenatal 27-0.8 MG Tabs     [employer] At-Home Covid-19 Test Kit  Generic drug: COVID-19 At Home Antigen Test              Follow up Visits: Follow-up with [facility] O[facility]YN as scheduled       Yash Ambrose MD   [facility] - OBGYN        Note to patient and family   The [facility] makes medical notes available to patients in the interest of transparency. However, please be advised that this is a medical document. It is intended as [id number]-rbix communication. It is written and medical language may contain abbreviations or verbiage that are technical and unfamiliar. It may appear blunt or direct. Medical documents are intended to carry relevant information, facts as evident, and the clinical opinion of the practitioner.

## 2023-02-16 NOTE — PLAN OF CARE
Problem: ANTEPARTUM/LABOR and DELIVERY  Goal: Maintain pregnancy as long as maternal and/or fetal condition is stable  Description: INTERVENTIONS:  - Maternal surveillance  - Fetal surveillance  - Monitor uterine activity  - Medications as ordered  - Bedrest  Outcome: Progressing  Goal: Anxiety is at manageable level  Description: INTERVENTIONS:  - [place] patient to unit/surroundings  - Establish a trusting relationship with patient  - Discuss possible complications or alterations in birth plan  - Explain treatment plan  - Explain testing/procedures prior to initiation  - Encourage participation in care  - Encourage verbalization of concerns/fears  - Identify coping mechanisms  - Administer/offer alternative therapies (Aroma therapy, distraction, guided imagery, massage, music therapy, therapeutic touch)  - Manage patient's environment (Avoid overstimulation of patient)  Outcome: Progressing  Goal: Demonstrates ability to cope with hospitalization/illness  Description: INTERVENTIONS:  - Encourage verbalization of feelings/concerns/expectations  - Provide quiet environment  - Assist patient to identify own strengths and abilities  - Encourage patient to set small goals for self  - Encourage participation in diversional activity  - Reinforce positive adaptation of new coping behaviors  - Include patient/family/caregiver in decisions  Outcome: Progressing     Problem: Patient/Family Goals  Goal: Patient/Family Long Term Goal  Description: Patient's Long Term Goal: safe delivery of infant      - See additional Care Plan goals for specific interventions  Outcome: Progressing  Goal: Patient/Family Short Term Goal  Description: Patient's Short Term Goal: adequate pain management      - See additional Care Plan goals for specific interventions  Outcome: Progressing

## 2023-02-16 NOTE — PROGRESS NOTES
Pt is a 27year old female admitted to TRG4/TRG4-A. Patient presents with:  R/o  Labor: Ctx starting around 1700 about every 6 min. 3/10 pain. +FM, denies lof and bleeding     Pt is  32w4d intra-uterine pregnancy. History obtained, consents signed. Oriented to room, staff, and plan of care.

## 2023-02-16 NOTE — DISCHARGE INSTRUCTIONS
Discharge Instructions    Diet: Regular  Activity: Normal activity         General Instructions    Call your [facility] doctor if: Fluid leaking from your vagina;Uterine contractions increasing in intensity and frequency;Vaginal bleeding;Vaginal or rectal pressure;Decrease in fetal movement; Temperature greater than 100F;Uterine contractions 10 minutes or closer for 1 to 2 hours

## 2023-02-17 PROBLEM — O99.820 GBS (GROUP B STREPTOCOCCUS CARRIER), +RV CULTURE, CURRENTLY PREGNANT: Status: ACTIVE | Noted: 2023-02-17

## 2023-02-17 PROBLEM — O99.820 GBS (GROUP B STREPTOCOCCUS CARRIER), +RV CULTURE, CURRENTLY PREGNANT (HCC): Status: ACTIVE | Noted: 2023-02-17

## 2023-02-17 LAB — GROUP B STREP BY PCR FOR PCR OVT: POSITIVE

## 2023-02-17 NOTE — PROGRESS NOTES
Beta #2 given to right outer gluteus without difficulty. Positive fetal movement. Denies painful contractions, LOF, and/or vaginal bleeding. Denies questions or concerns.

## 2023-02-18 PROBLEM — Z34.80 SUPERVISION OF OTHER NORMAL PREGNANCY, ANTEPARTUM: Status: RESOLVED | Noted: 2022-10-10 | Resolved: 2023-02-18

## 2023-02-18 PROBLEM — Z34.90 PREGNANCY (HCC): Status: RESOLVED | Noted: 2023-02-15 | Resolved: 2023-02-18

## 2023-02-18 PROBLEM — Z34.90 PREGNANCY: Status: RESOLVED | Noted: 2023-02-15 | Resolved: 2023-02-18

## 2023-02-18 PROBLEM — Z34.80 SUPERVISION OF OTHER NORMAL PREGNANCY, ANTEPARTUM (HCC): Status: RESOLVED | Noted: 2022-10-10 | Resolved: 2023-02-18

## 2023-02-20 ENCOUNTER — ROUTINE PRENATAL (OUTPATIENT)
Dept: OBGYN CLINIC | Facility: CLINIC | Age: 33
End: 2023-02-20
Payer: COMMERCIAL

## 2023-02-20 VITALS
BODY MASS INDEX: 27 KG/M2 | SYSTOLIC BLOOD PRESSURE: 100 MMHG | DIASTOLIC BLOOD PRESSURE: 60 MMHG | HEART RATE: 94 BPM | WEIGHT: 150 LBS

## 2023-02-20 DIAGNOSIS — Z3A.33 33 WEEKS GESTATION OF PREGNANCY: Primary | ICD-10-CM

## 2023-02-20 NOTE — PROGRESS NOTES
Patient presents with:  Prenatal Care: BRIGIDO    Routine prenatal visit without complaints. Patient denies any bleeding, leaking fluid, cramping, or regular uterine contractions. Good fetal movement. Assessment/Plan:  33w2d doing well  Was in hospital 23 for  contractions, received BTM.  0.5 cm dilation with history of precipitous delivery/PPROM. GBS positive at 33 weeks. Likely will recommend rescreen 36 weeks. Kick counts reminded  Reviewed  labor signs and symptoms. Modify activity as needed. Reviewed vaccine recommendations: Tdap done. Diagnoses and all orders for this visit:    33 weeks gestation of pregnancy  -     URINALYSIS NONAUTO W/O SCOPE (OB URISTIX)       Return in about 2 weeks (around 3/6/2023) for Routine Prenatal Visit.

## 2023-03-06 ENCOUNTER — ROUTINE PRENATAL (OUTPATIENT)
Dept: OBGYN CLINIC | Facility: CLINIC | Age: 33
End: 2023-03-06
Payer: COMMERCIAL

## 2023-03-06 VITALS
SYSTOLIC BLOOD PRESSURE: 102 MMHG | WEIGHT: 152.38 LBS | HEART RATE: 96 BPM | BODY MASS INDEX: 27 KG/M2 | DIASTOLIC BLOOD PRESSURE: 62 MMHG

## 2023-03-06 DIAGNOSIS — O99.820 GBS (GROUP B STREPTOCOCCUS CARRIER), +RV CULTURE, CURRENTLY PREGNANT: ICD-10-CM

## 2023-03-06 DIAGNOSIS — O47.00 PRETERM UTERINE CONTRACTIONS, ANTEPARTUM: ICD-10-CM

## 2023-03-06 DIAGNOSIS — Z3A.35 35 WEEKS GESTATION OF PREGNANCY: Primary | ICD-10-CM

## 2023-03-06 LAB
GLUCOSE (URINE DIPSTICK): NEGATIVE MG/DL
MULTISTIX LOT#: NORMAL NUMERIC

## 2023-03-06 NOTE — PROGRESS NOTES
BRIGIDO 35w2d    Doing ok, +FM  Irregular contractions but not more than 2-3 per hour which is stable for her  No LOF, VB    1. FHT-present  2. PNL:  GBS +--will send message to partners re-retest butI would likely suggest emprically treating for GBS +  3. Mode of delivery: last delivery precipitous, discussed options of IOL at 39 weeks,. She  Is considering her options.  We reviewed ideally we schedule earlier rather than later to ensure L and D availibility     Return in 1 weeks

## 2023-03-13 ENCOUNTER — ROUTINE PRENATAL (OUTPATIENT)
Dept: OBGYN CLINIC | Facility: CLINIC | Age: 33
End: 2023-03-13
Payer: COMMERCIAL

## 2023-03-13 VITALS — WEIGHT: 154.38 LBS | DIASTOLIC BLOOD PRESSURE: 62 MMHG | BODY MASS INDEX: 27 KG/M2 | SYSTOLIC BLOOD PRESSURE: 108 MMHG

## 2023-03-13 DIAGNOSIS — Z3A.36 36 WEEKS GESTATION OF PREGNANCY: Primary | ICD-10-CM

## 2023-03-13 LAB
GLUCOSE (URINE DIPSTICK): NEGATIVE MG/DL
MULTISTIX LOT#: NORMAL NUMERIC

## 2023-03-13 NOTE — PROGRESS NOTES
[id number] 36w2d seen for routine OB visit. Still has 2-3 ctx an hour, not more intense. Increased discharge. Denies vb. Reports good FM. Patient Active Problem List:     History of  premature rupture of membranes (PPROM)     Vestibular migraine     History of precipitous delivery     COVID-19 affecting pregnancy, antepartum     Elevated glucose tolerance test     Anemia during pregnancy in second trimester      uterine contractions, antepartum     GBS (group B Streptococcus carrier), +RV culture, currently pregnant       Gen: AAOx3, NAD  Resp: breathing comfortably  Abdomen: gravid, soft, nontender  Ext: nontender, no edema    Plan:  - GBS pos - PCN per protocol in labor  - return precautions reviewed    RTO in 1 week(s).

## 2023-03-17 ENCOUNTER — TELEPHONE (OUTPATIENT)
Dept: OBGYN CLINIC | Facility: CLINIC | Age: 33
End: 2023-03-17

## 2023-03-17 NOTE — TELEPHONE ENCOUNTER
G3/P 1102 GA 36 6/7 wks patient complaining of increased swelling in right foot and ankle. Denies any redness, heat, or pain other than tightness. Denies any headaches or vision changes  Last OV: 3/13/23 mel[place] with Dr. Darnell Pal (next OV 3/20)  Pregnancy Complications: hx of PPROM, hx of precipitous delivery, covid in pregnancy, anemia,  ctx- received bmtz, GBS +   Abdominal pain: denies any increase in ctx; 2-3 an hour  Leaking of fluid: denies  Vaginal Bleeding: denies  Fetal Movement: denies  Recommendations: continue with fluids and elevation. reviewed s/s of DVT to go to ER with. Questions answered and patient states understanding.   She will keep appt on Monday

## 2023-03-17 NOTE — TELEPHONE ENCOUNTER
Patient woke up this morning with her rt foot and leg swollen.  She states that her lt side is normal.  Please call to advise

## 2023-03-20 ENCOUNTER — TELEPHONE (OUTPATIENT)
Dept: OBGYN CLINIC | Facility: CLINIC | Age: 33
End: 2023-03-20

## 2023-03-20 ENCOUNTER — ROUTINE PRENATAL (OUTPATIENT)
Dept: OBGYN CLINIC | Facility: CLINIC | Age: 33
End: 2023-03-20
Payer: COMMERCIAL

## 2023-03-20 VITALS — SYSTOLIC BLOOD PRESSURE: 114 MMHG | DIASTOLIC BLOOD PRESSURE: 62 MMHG | WEIGHT: 157.25 LBS | BODY MASS INDEX: 28 KG/M2

## 2023-03-20 DIAGNOSIS — O99.820 GBS (GROUP B STREPTOCOCCUS CARRIER), +RV CULTURE, CURRENTLY PREGNANT: ICD-10-CM

## 2023-03-20 DIAGNOSIS — U07.1 COVID-19 AFFECTING PREGNANCY, ANTEPARTUM: ICD-10-CM

## 2023-03-20 DIAGNOSIS — Z34.80 SUPERVISION OF OTHER NORMAL PREGNANCY, ANTEPARTUM: Primary | ICD-10-CM

## 2023-03-20 DIAGNOSIS — O98.519 COVID-19 AFFECTING PREGNANCY, ANTEPARTUM: ICD-10-CM

## 2023-03-20 DIAGNOSIS — Z87.59 HISTORY OF PRETERM PREMATURE RUPTURE OF MEMBRANES (PPROM): ICD-10-CM

## 2023-03-20 DIAGNOSIS — O47.00 PRETERM UTERINE CONTRACTIONS, ANTEPARTUM: ICD-10-CM

## 2023-03-20 DIAGNOSIS — Z87.59 HISTORY OF PRECIPITOUS DELIVERY: ICD-10-CM

## 2023-03-20 DIAGNOSIS — Z3A.37 37 WEEKS GESTATION OF PREGNANCY: ICD-10-CM

## 2023-03-20 DIAGNOSIS — O99.012 ANEMIA DURING PREGNANCY IN SECOND TRIMESTER: ICD-10-CM

## 2023-03-20 NOTE — TELEPHONE ENCOUNTER
Received payment and fmla for patient and ok'd by Becky Powell patient to pay $15 put in bin for Annie in [place]     Thank you

## 2023-03-20 NOTE — PROGRESS NOTES
BRIGIDO  [id number]  GA: 37w2d   23  1515   BP: 114/62   Weight: 157 lb 4 oz (71.3 kg)       Doing well, +FM  Denies LOF/VB. +2-3 mild uctx per hour but unchanged since last seen in hospital.  Patient reports she is not concerned about her persistent mild uterine contractions. Mode of delivery:  anticipated  SVE deferred    GBS +  Fetal movement count given  Labor precautions provided   Patient reports right greater than left lower extremity edema with mild erythema that has slightly improved today. Bilateral lower extremity exam noted for negative Homans' sign, negative calf tenderness and bilateral +1 nonpitting edema from ankles to top of foot. DVT/PE precautions provided. Problem List Items Addressed This Visit        Gravid and     History of  premature rupture of membranes (PPROM)    History of precipitous delivery    Supervision of other normal pregnancy, antepartum - Primary     uterine contractions, antepartum    GBS (group B Streptococcus carrier), +RV culture, currently pregnant       Infectious Diseases    COVID-19 affecting pregnancy, antepartum       Hematology and Neoplasia    Anemia during pregnancy in second trimester   Other Visit Diagnoses     37 weeks gestation of pregnancy        Relevant Orders    URINALYSIS NONAUTO W/O SCOPE (OB URISTIX) (Completed)            RTC 1 week              Note to patient and family   The [facility] makes medical notes available to patients in the interest of transparency. However, please be advised that this is a medical document. It is intended as btmy-jt-csig communication. It is written and medical language may contain abbreviations or verbiage that are technical and unfamiliar. It may appear blunt or direct. Medical documents are intended to carry relevant information, facts as evident, and the clinical opinion of the practitioner.

## 2023-03-24 ENCOUNTER — MED REC SCAN ONLY (OUTPATIENT)
Dept: OBGYN CLINIC | Facility: CLINIC | Age: 33
End: 2023-03-24

## 2023-03-27 ENCOUNTER — ROUTINE PRENATAL (OUTPATIENT)
Dept: OBGYN CLINIC | Facility: CLINIC | Age: 33
End: 2023-03-27
Payer: COMMERCIAL

## 2023-03-27 VITALS — SYSTOLIC BLOOD PRESSURE: 104 MMHG | DIASTOLIC BLOOD PRESSURE: 58 MMHG | BODY MASS INDEX: 28 KG/M2 | WEIGHT: 157.5 LBS

## 2023-03-27 DIAGNOSIS — Z87.59 HISTORY OF PRETERM PREMATURE RUPTURE OF MEMBRANES (PPROM): ICD-10-CM

## 2023-03-27 DIAGNOSIS — Z3A.38 38 WEEKS GESTATION OF PREGNANCY: ICD-10-CM

## 2023-03-27 DIAGNOSIS — O98.519 COVID-19 AFFECTING PREGNANCY, ANTEPARTUM: ICD-10-CM

## 2023-03-27 DIAGNOSIS — O99.820 GBS (GROUP B STREPTOCOCCUS CARRIER), +RV CULTURE, CURRENTLY PREGNANT: ICD-10-CM

## 2023-03-27 DIAGNOSIS — Z87.59 HISTORY OF PRECIPITOUS DELIVERY: ICD-10-CM

## 2023-03-27 DIAGNOSIS — O99.012 ANEMIA DURING PREGNANCY IN SECOND TRIMESTER: ICD-10-CM

## 2023-03-27 DIAGNOSIS — U07.1 COVID-19 AFFECTING PREGNANCY, ANTEPARTUM: ICD-10-CM

## 2023-03-27 DIAGNOSIS — Z34.80 SUPERVISION OF OTHER NORMAL PREGNANCY, ANTEPARTUM: Primary | ICD-10-CM

## 2023-03-27 RX ORDER — MELATONIN
325
COMMUNITY

## 2023-03-27 RX ORDER — BREAST PUMP
EACH MISCELLANEOUS
Qty: 1 EACH | Refills: 0 | Status: SHIPPED | OUTPATIENT
Start: 2023-03-27

## 2023-03-27 NOTE — PROGRESS NOTES
BRIGIDO  [id number]  GA: 38w2d   23  1034   BP: 104/58   Weight: 157 lb 8 oz (71.4 kg)       Doing well, +FM  Denies LOF/VB/uctx  Mode of delivery:  anticipated  SVE 50/-2   GBS +  Fetal movement count given  Labor precautions provided   Discussed with patient induction of labor. Patient would like to wait till her next appointment. Problem List Items Addressed This Visit        Gravid and     History of  premature rupture of membranes (PPROM)    Relevant Medications    ferrous sulfate 325 (65 FE) MG Oral Tab EC    History of precipitous delivery    Relevant Medications    ferrous sulfate 325 (65 FE) MG Oral Tab EC    Supervision of other normal pregnancy, antepartum - Primary    Relevant Medications    ferrous sulfate 325 (65 FE) MG Oral Tab EC    GBS (group B Streptococcus carrier), +RV culture, currently pregnant    Relevant Medications    ferrous sulfate 325 (65 FE) MG Oral Tab EC       Infectious Diseases    COVID-19 affecting pregnancy, antepartum       Hematology and Neoplasia    Anemia during pregnancy in second trimester    Relevant Medications    ferrous sulfate 325 (65 FE) MG Oral Tab EC   Other Visit Diagnoses     38 weeks gestation of pregnancy        Relevant Medications    ferrous sulfate 325 (65 FE) MG Oral Tab EC    Other Relevant Orders    URINALYSIS NONAUTO W/O SCOPE (OB URISTIX) (Completed)            RTC 1 week  BRIGIDO w/ NST at 40wks+. D/w patient. Note to patient and family   The [facility] makes medical notes available to patients in the interest of transparency. However, please be advised that this is a medical document. It is intended as hdhi-bv-hpff communication. It is written and medical language may contain abbreviations or verbiage that are technical and unfamiliar. It may appear blunt or direct. Medical documents are intended to carry relevant information, facts as evident, and the clinical opinion of the practitioner.

## 2023-04-02 ENCOUNTER — HOSPITAL ENCOUNTER (INPATIENT)
Facility: HOSPITAL | Age: 33
LOS: 1 days | Discharge: HOME | End: 2023-04-03
Attending: OBSTETRICS & GYNECOLOGY | Admitting: OBSTETRICS & GYNECOLOGY
Payer: COMMERCIAL

## 2023-04-02 PROBLEM — Z34.90 PREGNANCY: Status: RESOLVED | Noted: 2023-04-02 | Resolved: 2023-04-02

## 2023-04-02 PROBLEM — Z34.80 SUPERVISION OF OTHER NORMAL PREGNANCY, ANTEPARTUM: Status: RESOLVED | Noted: 2022-10-10 | Resolved: 2023-04-02

## 2023-04-02 PROBLEM — Z34.90 PREGNANCY: Status: ACTIVE | Noted: 2023-04-02

## 2023-04-02 PROBLEM — Z34.80 SUPERVISION OF OTHER NORMAL PREGNANCY, ANTEPARTUM (HCC): Status: RESOLVED | Noted: 2022-10-10 | Resolved: 2023-04-02

## 2023-04-02 PROBLEM — Z34.90 PREGNANCY (HCC): Status: RESOLVED | Noted: 2023-04-02 | Resolved: 2023-04-02

## 2023-04-02 PROBLEM — Z34.90 PREGNANCY (HCC): Status: ACTIVE | Noted: 2023-04-02

## 2023-04-02 LAB
ANTIBODY SCREEN: NEGATIVE
BASOPHILS # BLD AUTO: 0.04 X10(3) UL (ref 0–0.2)
BASOPHILS NFR BLD AUTO: 0.5 %
EOSINOPHIL # BLD AUTO: 0.12 X10(3) UL (ref 0–0.7)
EOSINOPHIL NFR BLD AUTO: 1.5 %
ERYTHROCYTE [DISTWIDTH] IN BLOOD BY AUTOMATED COUNT: 12.8 %
HCT VFR BLD AUTO: 37.9 %
HGB BLD-MCNC: 12.4 G/DL
IMM GRANULOCYTES # BLD AUTO: 0.05 X10(3) UL (ref 0–1)
IMM GRANULOCYTES NFR BLD: 0.6 %
LYMPHOCYTES # BLD AUTO: 2.34 X10(3) UL (ref 1–4)
LYMPHOCYTES NFR BLD AUTO: 29.4 %
MCH RBC QN AUTO: 31.6 PG (ref 26–34)
MCHC RBC AUTO-ENTMCNC: 32.7 G/DL (ref 31–37)
MCV RBC AUTO: 96.4 FL
MONOCYTES # BLD AUTO: 0.5 X10(3) UL (ref 0.1–1)
MONOCYTES NFR BLD AUTO: 6.3 %
NEUTROPHILS # BLD AUTO: 4.9 X10 (3) UL (ref 1.5–7.7)
NEUTROPHILS # BLD AUTO: 4.9 X10(3) UL (ref 1.5–7.7)
NEUTROPHILS NFR BLD AUTO: 61.7 %
PLATELET # BLD AUTO: 131 10(3)UL (ref 150–450)
RBC # BLD AUTO: 3.93 X10(6)UL
RH BLOOD TYPE: POSITIVE
SARS-COV-2 RNA RESP QL NAA+PROBE: NOT DETECTED
T PALLIDUM AB SER QL IA: NONREACTIVE
WBC # BLD AUTO: 8 X10(3) UL (ref 4–11)

## 2023-04-02 PROCEDURE — 59400 OBSTETRICAL CARE: CPT | Performed by: OBSTETRICS & GYNECOLOGY

## 2023-04-02 PROCEDURE — 0HQ9XZZ REPAIR PERINEUM SKIN, EXTERNAL APPROACH: ICD-10-PCS | Performed by: OBSTETRICS & GYNECOLOGY

## 2023-04-02 RX ORDER — SIMETHICONE 80 MG
80 TABLET,CHEWABLE ORAL 3 TIMES DAILY PRN
Status: DISCONTINUED | OUTPATIENT
Start: 2023-04-02 | End: 2023-04-03

## 2023-04-02 RX ORDER — ACETAMINOPHEN 500 MG
1000 TABLET ORAL EVERY 6 HOURS PRN
Status: DISCONTINUED | OUTPATIENT
Start: 2023-04-02 | End: 2023-04-03

## 2023-04-02 RX ORDER — DEXTROSE, SODIUM CHLORIDE, SODIUM LACTATE, POTASSIUM CHLORIDE, AND CALCIUM CHLORIDE 5; .6; .31; .03; .02 G/100ML; G/100ML; G/100ML; G/100ML; G/100ML
INJECTION, SOLUTION INTRAVENOUS AS NEEDED
Status: DISCONTINUED | OUTPATIENT
Start: 2023-04-02 | End: 2023-04-02

## 2023-04-02 RX ORDER — BISACODYL 10 MG
10 SUPPOSITORY, RECTAL RECTAL ONCE AS NEEDED
Status: DISCONTINUED | OUTPATIENT
Start: 2023-04-02 | End: 2023-04-03

## 2023-04-02 RX ORDER — ACETAMINOPHEN 500 MG
500 TABLET ORAL EVERY 6 HOURS PRN
Status: DISCONTINUED | OUTPATIENT
Start: 2023-04-02 | End: 2023-04-03

## 2023-04-02 RX ORDER — IBUPROFEN 600 MG/1
600 TABLET ORAL EVERY 6 HOURS PRN
Status: DISCONTINUED | OUTPATIENT
Start: 2023-04-02 | End: 2023-04-02

## 2023-04-02 RX ORDER — IBUPROFEN 600 MG/1
600 TABLET ORAL EVERY 6 HOURS
Status: DISCONTINUED | OUTPATIENT
Start: 2023-04-02 | End: 2023-04-03

## 2023-04-02 RX ORDER — ONDANSETRON 2 MG/ML
4 INJECTION INTRAMUSCULAR; INTRAVENOUS EVERY 6 HOURS PRN
Status: DISCONTINUED | OUTPATIENT
Start: 2023-04-02 | End: 2023-04-02

## 2023-04-02 RX ORDER — 0.9 % SODIUM CHLORIDE 0.9 %
INTRAVENOUS SOLUTION INTRAVENOUS
Status: COMPLETED
Start: 2023-04-02 | End: 2023-04-02

## 2023-04-02 RX ORDER — AMPICILLIN 2 G/1
INJECTION, POWDER, FOR SOLUTION INTRAVENOUS
Status: COMPLETED
Start: 2023-04-02 | End: 2023-04-02

## 2023-04-02 RX ORDER — ACETAMINOPHEN 500 MG
500 TABLET ORAL EVERY 6 HOURS PRN
Status: DISCONTINUED | OUTPATIENT
Start: 2023-04-02 | End: 2023-04-02

## 2023-04-02 RX ORDER — DOCUSATE SODIUM 100 MG/1
100 CAPSULE, LIQUID FILLED ORAL
Status: DISCONTINUED | OUTPATIENT
Start: 2023-04-02 | End: 2023-04-03

## 2023-04-02 RX ORDER — SODIUM CHLORIDE, SODIUM LACTATE, POTASSIUM CHLORIDE, CALCIUM CHLORIDE 600; 310; 30; 20 MG/100ML; MG/100ML; MG/100ML; MG/100ML
INJECTION, SOLUTION INTRAVENOUS CONTINUOUS
Status: DISCONTINUED | OUTPATIENT
Start: 2023-04-02 | End: 2023-04-02

## 2023-04-02 RX ORDER — AMMONIA INHALANTS 0.04 G/.3ML
0.3 INHALANT RESPIRATORY (INHALATION) AS NEEDED
Status: DISCONTINUED | OUTPATIENT
Start: 2023-04-02 | End: 2023-04-02

## 2023-04-02 RX ORDER — TERBUTALINE SULFATE 1 MG/ML
0.25 INJECTION, SOLUTION SUBCUTANEOUS AS NEEDED
Status: DISCONTINUED | OUTPATIENT
Start: 2023-04-02 | End: 2023-04-02

## 2023-04-02 RX ORDER — TRISODIUM CITRATE DIHYDRATE AND CITRIC ACID MONOHYDRATE 500; 334 MG/5ML; MG/5ML
30 SOLUTION ORAL AS NEEDED
Status: DISCONTINUED | OUTPATIENT
Start: 2023-04-02 | End: 2023-04-02

## 2023-04-02 NOTE — PROGRESS NOTES
Pt admitted to room 113 for management of labor.  Report given to and care endorsed to Tayla Mcclain RN.

## 2023-04-02 NOTE — PLAN OF CARE
Problem: BIRTH - VAGINAL/ SECTION  Goal: Fetal and maternal status remain reassuring during the birth process  Description: INTERVENTIONS:  - Monitor vital signs  - Monitor fetal heart rate  - Monitor uterine activity  - Monitor labor progression (vaginal delivery)  - DVT prophylaxis (C/S delivery)  - Surgical antibiotic prophylaxis (C/S delivery)  2023 by Leanne Vickers RN  Outcome: Progressing  2023 by Leanne Vickers RN  Outcome: Progressing     Problem: PAIN - ADULT  Goal: Verbalizes/displays adequate comfort level or patient's stated pain goal  Description: INTERVENTIONS:  - Encourage pt to monitor pain and request assistance  - Assess pain using appropriate pain scale  - Administer analgesics based on type and severity of pain and evaluate response  - Implement non-pharmacological measures as appropriate and evaluate response  - Consider cultural and social influences on pain and pain management  - Manage/alleviate anxiety  - Utilize distraction and/or relaxation techniques  - Monitor for opioid side effects  - Notify MD/LIP if interventions unsuccessful or patient reports new pain  - Anticipate increased pain with activity and pre-medicate as appropriate  2023 by Leanne Vickers RN  Outcome: Progressing  2023 by Leanne Vickers RN  Outcome: Progressing     Problem: ANXIETY  Goal: Will report anxiety at manageable levels  Description: INTERVENTIONS:  - Administer medication as ordered  - Teach and rehearse alternative coping skills  - Provide emotional support with 1:1 interaction with staff  2023 by Leanne Vickers RN  Outcome: Progressing  2023 by Leanne Vcikers RN  Outcome: Progressing     Problem: Patient/Family Goals  Goal: Patient/Family Long Term Goal  Description: Patient's Long Term Goal: Safe delivery    Interventions:  - See additional Care Plan goals for specific interventions  2023 by Leanne Vickers RN  Outcome: Progressing  4/2/2023 0746 by Annette Sauceda RN  Outcome: Progressing  Goal: Patient/Family Short Term Goal  Description: Patient's Short Term Goal: Pain management with non-pharmacological techniques    Interventions:   - See additional Care Plan goals for specific interventions  4/2/2023 0746 by Annette Sauceda RN  Outcome: Progressing  4/2/2023 0746 by Annette Sauceda RN  Outcome: Progressing

## 2023-04-02 NOTE — PLAN OF CARE
Problem: BIRTH - VAGINAL/ SECTION  Goal: Fetal and maternal status remain reassuring during the birth process  Description: INTERVENTIONS:  - Monitor vital signs  - Monitor fetal heart rate  - Monitor uterine activity  - Monitor labor progression (vaginal delivery)  - DVT prophylaxis (C/S delivery)  - Surgical antibiotic prophylaxis (C/S delivery)  Outcome: Completed     Problem: PAIN - ADULT  Goal: Verbalizes/displays adequate comfort level or patient's stated pain goal  Description: INTERVENTIONS:  - Encourage pt to monitor pain and request assistance  - Assess pain using appropriate pain scale  - Administer analgesics based on type and severity of pain and evaluate response  - Implement non-pharmacological measures as appropriate and evaluate response  - Consider cultural and social influences on pain and pain management  - Manage/alleviate anxiety  - Utilize distraction and/or relaxation techniques  - Monitor for opioid side effects  - Notify MD/LIP if interventions unsuccessful or patient reports new pain  - Anticipate increased pain with activity and pre-medicate as appropriate  Outcome: Completed     Problem: ANXIETY  Goal: Will report anxiety at manageable levels  Description: INTERVENTIONS:  - Administer medication as ordered  - Teach and rehearse alternative coping skills  - Provide emotional support with 1:1 interaction with staff  Outcome: Completed     Problem: Patient/Family Goals  Goal: Patient/Family Long Term Goal  Description: Patient's Long Term Goal: Safe delivery    Interventions:  - See additional Care Plan goals for specific interventions  Outcome: Completed  Goal: Patient/Family Short Term Goal  Description: Patient's Short Term Goal: Pain management with non-pharmacological techniques    Interventions:   - See additional Care Plan goals for specific interventions  Outcome: Completed

## 2023-04-02 NOTE — PROGRESS NOTES
NURSING ADMISSION NOTE      Patient admitted via wheelchair. Oriented to room. Safety precautions initiated. Bed in low position. Call light in reach. Both mom/ infant ID bands verified, hugs and kisses on, teaching initiated, care plan discussed.

## 2023-04-02 NOTE — PROGRESS NOTES
Pt is a 27year old female admitted to 113/113-A. Patient presents with:  R/o Labor  R/o Rom: ROM at 0630 with clear fluid       Pt is  39w1d intra-uterine pregnancy. History obtained, consents signed. Oriented to room, staff, and plan of care.

## 2023-04-02 NOTE — PROGRESS NOTES
Patient back to bed in room 113. Awaiting transfer to Mother Baby room when one becomes available. Bed linens changed and warm blankets given at this time.

## 2023-04-02 NOTE — PROGRESS NOTES
Patient up to bathroom with assist x 1. Voided 275ml in hat at 1130. Patient transferred to mother/baby room 2209 per wheelchair in stable condition with baby and personal belongings. Accompanied by significant other and staff. Report given to mother/baby RN.

## 2023-04-02 NOTE — L&D DELIVERY NOTE
Lisbet Jones, Girl [[id number]]    Labor Events     labor?: No   steroids?: None  Antibiotics received during labor?: Yes  Antibiotics (enter # doses in comment): ampicillin (Comment: x1 dose)  Rupture date/time: 2023 0630     Rupture type: SROM  Fluid color: Clear  Intrapartum & labor complications: Group B beta strep +     Labor Length    1st stage: 0h 54m  2nd stage: 0h 02m  3rd stage: 0h 02m     Labor Event Times    Labor onset date/time: 2023 6263  Dilation complete date/time: 2023 9268  Start pushing date/time: 2023 0935      Presentation    Presentation: Vertex  Position: Occiput Anterior     Operative Delivery    Operative Vaginal Delivery: No            Shoulder Dystocia    Shoulder Dystocia: No     Anesthesia    Method: Local           Delivery    Delivery date/time:  23 09:35:29   Delivery type: Normal spontaneous vaginal delivery    Details:     Delivery location: delivery room     Delivery Providers    Delivering Clinician: Magalis Kim DO   Delivery personnel:  Provider Role   Maddie Wright RN Baby Nurse   Beth Gordon, JESSICA Delivery Nurse         Cord    Vessels: 3 Vessels  Complications: None  # of loops: 0  Timed cord clamping: Yes  Time in sec: 60  Cord blood disposition: to lab  Gases sent?: No     Resuscitation    Method: None      Measurements    Weight: 3420 g 7 lb 8.6 oz Length: 18.7 cm   Head circum.: 33 cm Chest circum. : 35 cm      Abdominal circum.: 32 cm       Placenta    Date/time: 2023 6095  Removal: Spontaneous  Appearance: Intact  Disposition: Pathology     Apgars    Living status: Living   Apgar Scoring Key:    0 1 2    Skin color Blue or pale Acrocyanotic Completely pink    Heart rate Absent <100 bpm >100 bpm    Reflex irritability No response Grimace Cry or active withdrawal    Muscle tone Limp Some flexion Active motion    Respiratory effort Absent Weak cry; hypoventilation Good, crying              1 Minute:  5 Minute:  10 Minute:  15 Minute:  20 Minute:    Skin color: 0  1       Heart rate: 2  2       Reflex irritablity: 2  2       Muscle tone: 2  2       Respiratory effort: 2  2       Total: 8  9          Apgars assigned by: James Worrell RN   disposition: with mother     Skin to Skin    Skin to skin initiated date/time: 2023 0955  Skin to skin with: Mother     Vaginal Count    Initial count RN: Beth Gordon RN  Initial count Tech: Kassie Dearth   Sponges   Sharps    Initial counts 11   0    Final counts 11   2    Final count RN: Beth Gordon RN  Final count MD: Magalis Kim, DO     Delivery (Maternal)    Episiotomy: None  Perineal lacerations: 1st Repaired?: Yes   Vaginal laceration?: No    Cervical laceration?: No    Clitoral laceration?: No    Estimated blood loss (mL): 250          27 y.o.  at 36w3d with  female infant, APGARS 9/9, weight 7lb9oz. Body and shoulders easily delivered, cord clamped x2 and cut after 30 seconds. Placenta delivered spontaneously, intact. 1st degree perineal laceration repaired with 3-0 chromic.  Good hemostasis,  cc

## 2023-04-03 VITALS
DIASTOLIC BLOOD PRESSURE: 53 MMHG | HEIGHT: 63 IN | HEART RATE: 67 BPM | TEMPERATURE: 99 F | RESPIRATION RATE: 16 BRPM | BODY MASS INDEX: 27.91 KG/M2 | SYSTOLIC BLOOD PRESSURE: 103 MMHG | WEIGHT: 157.5 LBS

## 2023-04-03 LAB
BASOPHILS # BLD AUTO: 0.05 X10(3) UL (ref 0–0.2)
BASOPHILS NFR BLD AUTO: 0.6 %
EOSINOPHIL # BLD AUTO: 0.08 X10(3) UL (ref 0–0.7)
EOSINOPHIL NFR BLD AUTO: 1 %
ERYTHROCYTE [DISTWIDTH] IN BLOOD BY AUTOMATED COUNT: 13 %
HCT VFR BLD AUTO: 28.9 %
HGB BLD-MCNC: 9.7 G/DL
IMM GRANULOCYTES # BLD AUTO: 0.05 X10(3) UL (ref 0–1)
IMM GRANULOCYTES NFR BLD: 0.6 %
LYMPHOCYTES # BLD AUTO: 1.66 X10(3) UL (ref 1–4)
LYMPHOCYTES NFR BLD AUTO: 20 %
MCH RBC QN AUTO: 31.7 PG (ref 26–34)
MCHC RBC AUTO-ENTMCNC: 33.6 G/DL (ref 31–37)
MCV RBC AUTO: 94.4 FL
MONOCYTES # BLD AUTO: 0.56 X10(3) UL (ref 0.1–1)
MONOCYTES NFR BLD AUTO: 6.7 %
NEUTROPHILS # BLD AUTO: 5.92 X10 (3) UL (ref 1.5–7.7)
NEUTROPHILS # BLD AUTO: 5.92 X10(3) UL (ref 1.5–7.7)
NEUTROPHILS NFR BLD AUTO: 71.1 %
PLATELET # BLD AUTO: 113 10(3)UL (ref 150–450)
RBC # BLD AUTO: 3.06 X10(6)UL
WBC # BLD AUTO: 8.3 X10(3) UL (ref 4–11)

## 2023-04-03 RX ORDER — IBUPROFEN 600 MG/1
600 TABLET ORAL EVERY 6 HOURS PRN
Qty: 20 TABLET | Refills: 0 | Status: SHIPPED | OUTPATIENT
Start: 2023-04-03

## 2023-04-03 NOTE — DISCHARGE SUMMARY
Gumaro Shankar Patient Status:  inpatient    1990 MRN [id number]   Location 3086/8783-P Attending EMG [address] [place] MD     VAGINAL DELIVERY DISCHARGE SUMMARY     Admit date: 2023    Discharge date: 4/3/2023     Attending Physician: Sugar Jones MD     Discharge Diagnoses: Term pregnancy, labor    Procedures: Normal spontaneous vaginal delivery with repair of 1st degree perineal laceration    Complications: None    Hospital Course: Routine postpartum care    Discharged Condition: Stable    Disposition: Home     Current Discharge Medication List    START taking these medications    ibuprofen 600 MG Oral Tab  Take 1 tablet (600 mg total) by mouth every 6 (six) hours as needed for Pain. Qty: 20 tablet Refills: 0      CONTINUE these medications which have NOT CHANGED    ferrous sulfate 325 (65 FE) MG Oral Tab EC  Take 1 tablet (325 mg total) by mouth daily with breakfast.    Prenatal Vit-Fe Fumarate-FA (MULTI PRENATAL) 27-0.8 MG Oral Tab  Take 1 tablet by mouth. !! Misc. Devices (BREAST PUMP) Does not apply Misc  Breast pump for breast feeding  Qty: 1 each Refills: 0    !! Misc. Devices (BREAST PUMP) Does not apply Misc  Breast pump for breast-feeding  Qty: 1 each Refills: 0    !! - Potential duplicate medications found. Please discuss with provider.       STOP taking these medications    QUICKVUE AT-HOME COVID-19 TEST In Vitro Kit          Diet: General    Activity: Light activity, pelvic rest,  no driving for 1 week    Follow-up:   [facility] [place] [id number] [phone]  Follow up in 6 week(s)  for post partum visit

## 2023-04-06 ENCOUNTER — TELEPHONE (OUTPATIENT)
Dept: OBGYN UNIT | Facility: HOSPITAL | Age: 33
End: 2023-04-06

## 2023-04-06 NOTE — PROGRESS NOTES
Reviewed self and infant care w / mom, she verbalizes understanding of instructions reviewed. Encourage to follow up w/ MDs as directed and w/ questions/concerns. To ped appt now, still c/o lower abd pain, OB aware and care reviewed, to call OB again Monday if not sign improved.

## 2023-05-15 ENCOUNTER — POSTPARTUM (OUTPATIENT)
Dept: OBGYN CLINIC | Facility: CLINIC | Age: 33
End: 2023-05-15
Payer: COMMERCIAL

## 2023-05-15 VITALS
DIASTOLIC BLOOD PRESSURE: 70 MMHG | HEIGHT: 63 IN | BODY MASS INDEX: 25.45 KG/M2 | WEIGHT: 143.63 LBS | HEART RATE: 67 BPM | SYSTOLIC BLOOD PRESSURE: 110 MMHG

## 2023-05-15 DIAGNOSIS — Z12.31 ENCOUNTER FOR SCREENING MAMMOGRAM FOR MALIGNANT NEOPLASM OF BREAST: ICD-10-CM

## 2023-05-15 DIAGNOSIS — N81.89 PELVIC FLOOR WEAKNESS: ICD-10-CM

## 2023-05-15 DIAGNOSIS — Z12.4 SCREENING FOR MALIGNANT NEOPLASM OF CERVIX: ICD-10-CM

## 2023-05-15 PROBLEM — O99.012 ANEMIA DURING PREGNANCY IN SECOND TRIMESTER: Status: RESOLVED | Noted: 2023-01-03 | Resolved: 2023-05-15

## 2023-05-15 PROBLEM — R73.09 ELEVATED GLUCOSE TOLERANCE TEST: Status: RESOLVED | Noted: 2023-01-03 | Resolved: 2023-05-15

## 2023-05-15 PROBLEM — O99.820 GBS (GROUP B STREPTOCOCCUS CARRIER), +RV CULTURE, CURRENTLY PREGNANT (HCC): Status: RESOLVED | Noted: 2023-02-17 | Resolved: 2023-05-15

## 2023-05-15 PROBLEM — O99.012 ANEMIA DURING PREGNANCY IN SECOND TRIMESTER (HCC): Status: RESOLVED | Noted: 2023-01-03 | Resolved: 2023-05-15

## 2023-05-15 PROBLEM — O99.820 GBS (GROUP B STREPTOCOCCUS CARRIER), +RV CULTURE, CURRENTLY PREGNANT: Status: RESOLVED | Noted: 2023-02-17 | Resolved: 2023-05-15

## 2023-05-15 PROCEDURE — 87624 HPV HI-RISK TYP POOLED RSLT: CPT | Performed by: OBSTETRICS & GYNECOLOGY

## 2023-05-16 ENCOUNTER — LAB ENCOUNTER (OUTPATIENT)
Dept: LAB | Age: 33
End: 2023-05-16
Attending: OBSTETRICS & GYNECOLOGY
Payer: COMMERCIAL

## 2023-05-16 LAB
BASOPHILS # BLD AUTO: 0.06 X10(3) UL (ref 0–0.2)
BASOPHILS NFR BLD AUTO: 1.5 %
EOSINOPHIL # BLD AUTO: 0.25 X10(3) UL (ref 0–0.7)
EOSINOPHIL NFR BLD AUTO: 6.1 %
ERYTHROCYTE [DISTWIDTH] IN BLOOD BY AUTOMATED COUNT: 11.6 %
HCT VFR BLD AUTO: 38.1 %
HGB BLD-MCNC: 12.4 G/DL
HPV I/H RISK 1 DNA SPEC QL NAA+PROBE: NEGATIVE
IMM GRANULOCYTES # BLD AUTO: 0.01 X10(3) UL (ref 0–1)
IMM GRANULOCYTES NFR BLD: 0.2 %
LYMPHOCYTES # BLD AUTO: 1.91 X10(3) UL (ref 1–4)
LYMPHOCYTES NFR BLD AUTO: 46.9 %
MCH RBC QN AUTO: 31.2 PG (ref 26–34)
MCHC RBC AUTO-ENTMCNC: 32.5 G/DL (ref 31–37)
MCV RBC AUTO: 96 FL
MONOCYTES # BLD AUTO: 0.3 X10(3) UL (ref 0.1–1)
MONOCYTES NFR BLD AUTO: 7.4 %
NEUTROPHILS # BLD AUTO: 1.54 X10 (3) UL (ref 1.5–7.7)
NEUTROPHILS # BLD AUTO: 1.54 X10(3) UL (ref 1.5–7.7)
NEUTROPHILS NFR BLD AUTO: 37.9 %
PLATELET # BLD AUTO: 195 10(3)UL (ref 150–450)
RBC # BLD AUTO: 3.97 X10(6)UL
VIT D+METAB SERPL-MCNC: 35.5 NG/ML (ref 30–100)
WBC # BLD AUTO: 4.1 X10(3) UL (ref 4–11)

## 2023-05-16 PROCEDURE — 82306 VITAMIN D 25 HYDROXY: CPT | Performed by: OBSTETRICS & GYNECOLOGY

## 2023-05-16 PROCEDURE — 85025 COMPLETE CBC W/AUTO DIFF WBC: CPT | Performed by: OBSTETRICS & GYNECOLOGY

## 2023-06-15 ENCOUNTER — TELEPHONE (OUTPATIENT)
Dept: PHYSICAL THERAPY | Facility: HOSPITAL | Age: 33
End: 2023-06-15

## 2023-06-20 ENCOUNTER — OFFICE VISIT (OUTPATIENT)
Dept: PHYSICAL THERAPY | Facility: HOSPITAL | Age: 33
End: 2023-06-20
Attending: OBSTETRICS & GYNECOLOGY
Payer: COMMERCIAL

## 2023-06-20 DIAGNOSIS — N81.89 PELVIC FLOOR WEAKNESS: ICD-10-CM

## 2023-06-20 PROCEDURE — 97110 THERAPEUTIC EXERCISES: CPT

## 2023-06-20 PROCEDURE — 97162 PT EVAL MOD COMPLEX 30 MIN: CPT

## 2023-06-20 PROCEDURE — 97112 NEUROMUSCULAR REEDUCATION: CPT

## 2023-06-27 ENCOUNTER — OFFICE VISIT (OUTPATIENT)
Dept: PHYSICAL THERAPY | Facility: HOSPITAL | Age: 33
End: 2023-06-27
Attending: OBSTETRICS & GYNECOLOGY
Payer: COMMERCIAL

## 2023-06-27 PROCEDURE — 97112 NEUROMUSCULAR REEDUCATION: CPT

## 2023-06-27 PROCEDURE — 97110 THERAPEUTIC EXERCISES: CPT

## 2023-07-11 ENCOUNTER — OFFICE VISIT (OUTPATIENT)
Dept: PHYSICAL THERAPY | Facility: HOSPITAL | Age: 33
End: 2023-07-11
Attending: OBSTETRICS & GYNECOLOGY
Payer: COMMERCIAL

## 2023-07-11 PROCEDURE — 97112 NEUROMUSCULAR REEDUCATION: CPT

## 2023-07-11 PROCEDURE — 97110 THERAPEUTIC EXERCISES: CPT

## 2023-07-11 PROCEDURE — 97140 MANUAL THERAPY 1/> REGIONS: CPT

## 2023-07-11 NOTE — PROGRESS NOTES
Diagnosis:   Pelvic floor weakness (N81.89)      Referring Provider: Faith Cline  Date of Evaluation:    6/20/2023    Precautions:  None Next MD visit:   none scheduled  Date of Surgery: n/a   Insurance Primary/Secondary: [employer] [place] P[place] / N/A     # Auth Visits: no visit limit, no auth            Subjective:   Having a lot of L neck and L back pain from nursing. Having some L rectus abdominis pain also. No leakage. No nocturia. Pain: 4/10-neck, 3/10 back      Objective:  Tx flow sheet   7/11/2023   Lumbar ROM:flexion 75% ext 75%-pain T/L  Cervical ROM: rotation R 55-pain L 55-stiff, SB R 30-pain, L 37-pain  Posture-rounded shoulders, FHP  Diastasis Recti: (finger width depth while contracted)  Above Umbilicus: 2 1/2 (was:3)  Umbilicus: 2 1/2 (was:3)  Below Umbilicus: 2 1/2 (was:3)  Palpation: tightness/tenderness L UT, L lev scap, L rectus abdominis  Pelvic Alignment: symmetrical (was:L ant ilium, L LE functionally longer)    6/20/2023  URINARY HABITS  Types of symptoms: increased urgency  Events associated with the onset of urinary complaints: birth of child  Urinary Frequency: 3 hours  Fluid Intake: water  Bladder irritants: 1 cup decaff, 1/2 glass of wine  Post void dribble: yes  Hovering: yes  Empty bladder just in case: yes  Do you ever leak urine without knowing it? no    BOWEL HABITS  Types of symptoms: increased urgency   Frequency of bowel movements: daily, more urgent than in the past  Stool consistency: [place] Stool Scale: 2-3    SEXUAL HEALTH STATUS  Marinoff Scale:   x     Discomfort that does not affect completion  Sexual [place] Status: active  Pain with initial and/or deep penetration: both    Posture: rounded shoulders, increased lumbar lordosis      External Palpation: B lumbar paraspinal tightness  Abdominal Wall Integrity: + DR      Range Of Motion  Lumbar AROM screen: wfl except extension 20 degrees-pt reports that she gets dizzy if goes back furthe  LE AROM screen: grossly WNL     Strength (MMT) 5/5 DELBERT LE except hip abd and ext L 4-/5, hip abd and ext R 4/5  Transverse Abdominis: 1/5    Flexibility Summary: WNL DELBERT LE except hamstrings -10 90/90    Informed consent for internal pelvic evaluation given: Yes    External Observation:   Voluntary contraction: present   Voluntary relaxation: present  Involuntary contraction: present  Involuntary relaxation: present    Mons pubis: WNL  Labia majora: WNL  Labia minora: WNL  Urethral meatus: WNL  Introitus: other: tenderness  Perineal body: other: tenderness    Internal Examination   Pelvic Floor Muscle strength: (PERF= Power/Endurance/Reps/Fast) MMT: 2/3/3/3    Tissue Laxity Test:  Anterior Wall: Min  Posterior Wall: WNL  Apical: WNL    Eccentric lengthening contraction: wnl  Bearing down Valsalva maneuver (2-3x): + cystocele    Internal Palpation: WNL except Superficial Transverse Perineal - minimal restriction and tenderness  Bulbocavernosus R>L minimal restriction and tenderness  Ischiocavernosus R>L minimal restriction and tenderness  Deep Transverse Perineal - minimal restriction and tenderness  Urethrovaginalis R>L minimal restriction and tenderness  Pubococcygeus/Pubovaginalis R>Lminimal restriction and tenderness  Iliococcygeus R>L moderate restriction and tenderness  Coccygeus R>L moderate restriction and tenderness      Assessment:   Improved flexibility and decreased tightness of mm C/L to assist with activities of daily living after Manual therapy and range of motion exs. No pain neck or back. Pt appeared to understand improved body mechanics/posture with transferring and nursing. Added: Transverse abdominis strengthening exs with KT for strengthening and promoting closure of rectus abdominis. Decr  as noted above. Focus on DR today with plans to perform biofeedback next visit. Goals:   Goals: (to be met in 10 visits)-progressing  1.  Independent in HEP and progression with improved understanding of bladder/bowel health, bladder retraining and long-term management. 2. Patient will have resolution of pelvic floor muscle tension to assist with </=2/10 with intercourse  3. Patient will demonstrate improve PFM isolation, coordination and strength so she is able to reduce urinary urge, decrease pelvic pressure and prevent leakage during ADL's.  4. Pt will have increased transverse abdominis muscle strength to 2/5 and hip strength to 5/5 to assist with supporting pelvic floor muscles. 5. PFM contraction before increase intra-abdominal pressure. Plan: Continue plan of care as pt tolerates with focus on pelvic floor muscle rehab. Next visit: biofeedback . Date: 6/27/2023  TX#: 2/10 Date:    7/11/2023              TX#: 3/ Date:                 TX#: 4/ Date:                 TX#: 5/ Date:    Tx#: 6/   PFM NM  Re-ed     Bladder diary  Bladder fitness+ handout   PFM NM  Re-ed     [id number], range of motion , palpation    Posture ed  Transfer ed    Body mechanics with nursing    KT-rectus abdominis w instructions for removal biofeedback      Ther Ex:   MET  Kegel +  iso hip add  10\" x10     Kegel + articulating bridge x10 + iso hip add    Kegel +  U/LE lift  x10     Kegel +  Resisted   ER GTB  x10     Kegel + clamshell GTB x10 R/L      Kegel with plie/ER resisted RTB x10     HEP issued with GTB   Ther Ex:   BKC ball x20  LTR ball x20  Supine-U/LE trunk/neck rotation x20  abdominal bracing + kegel + U/LE lift x10  abdominal bracing + alt LE kick x20  UT stretch c-spine x20 sec r/l  PROM c-spine rotation x10 ea           Manual therapy:   STM L upper trap, levator scap,   SO release x3 min  C-spine distraction x3 min                 HEP: Kegel 10 repetitions 3x/day, 10 sec on/10 sec off, 2 sec on 2-4 sec off , diaphragmatic breathing , abdominal bracing  + additions above     Charges: TE 15', NMR  15'  MT 15'    Total Timed Treatment: 45 min  Total Treatment Time: 45 min  PATIENT SUMMARY   Naga Perez is a 26year old female  who presents to therapy today with complaints of pelvic floor muscle weakness. Couldn't walk or stand for 1 week after 3rd child was born on April 4th, 2025 due to pelvic floor pressure. Had some incontinence that has resolved but still has some increased urine urgency. Has noticed increased bowel urgency but no FI. Thinking that she has DR. 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

## 2023-07-18 ENCOUNTER — OFFICE VISIT (OUTPATIENT)
Dept: PHYSICAL THERAPY | Facility: HOSPITAL | Age: 33
End: 2023-07-18
Attending: OBSTETRICS & GYNECOLOGY
Payer: COMMERCIAL

## 2023-07-18 PROCEDURE — 97112 NEUROMUSCULAR REEDUCATION: CPT

## 2023-07-18 PROCEDURE — 97140 MANUAL THERAPY 1/> REGIONS: CPT

## 2023-07-18 NOTE — PROGRESS NOTES
Diagnosis:   Pelvic floor weakness (N81.89)      Referring Provider: Mario Triplett  Date of Evaluation:    6/20/2023    Precautions:  None Next MD visit:   none scheduled  Date of Surgery: n/a   Insurance Primary/Secondary: [employer] [place] P[place] / N/A     # Auth Visits: no visit limit, no auth            Subjective:   Having a lot of L neck with R SB. No back pain. Had vaginal pain when going down the stairs after sitting that resolved with movement. No pain L rectus abdominis pain when moving around. Has some soreness with palpation. No leakage. No nocturia. KT helped w supporting RA mm, but left tape on for 5 days and had difficulty removing tape. Initial evaluation symptoms include: urgency/frequency and pressure, vaginal pain    PFDI-20: 70.84/300; Impairment= 24 %-6/20/2023  Pain: 210-neck, 0/10 back      Objective:  Tx flow sheet   7/18/2023   Biofeedback: Recruitment good, holding ability fair with tonic good with phasic, derecruitment fair with tonic good with phasic, baseline between contractions 2.0Ua, baseline resting average flatlineUa (normal 2.0Ua)    Observation: min redness RA from KT, residual adhesive present  Cervical ROM: rotation R 60-pain L 60-stiff, SB R 34-pain, L 37-pain    7/11/2023   Lumbar ROM:flexion 75% ext 75%-pain T/L  Cervical ROM: rotation R 55-pain L 55-stiff, SB R 30-pain, L 37-pain  Posture-rounded shoulders, FHP  Diastasis Recti: (finger width depth while contracted)  Above Umbilicus: 2 1/2 (was:3)  Umbilicus: 2 1/2 (was:3)  Below Umbilicus: 2 1/2 (was:3)  Palpation: tightness/tenderness L UT, L lev scap, L rectus abdominis  Pelvic Alignment: symmetrical (was:L ant ilium, L LE functionally longer)    6/20/2023  URINARY HABITS  Types of symptoms: increased urgency  Events associated with the onset of urinary complaints: birth of child  Urinary Frequency: 3 hours  Fluid Intake: water  Bladder irritants: 1 cup decaff, 1/2 glass of wine  Post void dribble: yes  Hovering: yes  Empty bladder just in case: yes  Do you ever leak urine without knowing it? no    BOWEL HABITS  Types of symptoms: increased urgency   Frequency of bowel movements: daily, more urgent than in the past  Stool consistency: [place] Stool Scale: 2-3    [place] Scale:   x     Discomfort that does not affect completion  Sexual [place] Status: active  Pain with initial and/or deep penetration: both    Posture: rounded shoulders, increased lumbar lordosis      External Palpation: B lumbar paraspinal tightness  Abdominal Wall Integrity: + DR      Range Of Motion  Lumbar AROM screen: wfl except extension 20 degrees-pt reports that she gets dizzy if goes back furthe  LE AROM screen: grossly WNL     Strength (MMT) 5/5 DELBERT LE except hip abd and ext L 4-/5, hip abd and ext R 4/5  Transverse Abdominis: 1/5    Flexibility Summary: WNL DELBERT LE except hamstrings -10 90/90    Informed consent for internal pelvic evaluation given: Yes    External Observation:   Voluntary contraction: present   Voluntary relaxation: present  Involuntary contraction: present  Involuntary relaxation: present    Mons pubis: WNL  Labia majora: WNL  Labia minora:  WNL  Urethral meatus: WNL  Introitus: other: tenderness  Perineal body: other: tenderness    Internal Examination   Pelvic Floor Muscle strength: (PERF= Power/Endurance/Reps/Fast) MMT: 2/3/3/3    Tissue Laxity Test:  Anterior Wall: Min  Posterior Wall: WNL  Apical: WNL    Eccentric lengthening contraction: wnl  Bearing down Valsalva maneuver (2-3x): + cystocele    Internal Palpation: WNL except Superficial Transverse Perineal - minimal restriction and tenderness  Bulbocavernosus R>L minimal restriction and tenderness  Ischiocavernosus R>L minimal restriction and tenderness  Deep Transverse Perineal - minimal restriction and tenderness  Urethrovaginalis R>L minimal restriction and tenderness  Pubococcygeus/Pubovaginalis R>Lminimal restriction and tenderness  Iliococcygeus R>L moderate restriction and tenderness  Coccygeus R>L moderate restriction and tenderness      Assessment:   Improved coordination and strength of pelvic floor muscles via biofeedback . Full range of motion cervical spine after stretching. Pt appeared to understand correct body mechanics during breastfeeding and to add cervical stretches afterward to not aggravate neck. .    Goals:   Goals: (to be met in 10 visits)-progressing  1. Independent in HEP and progression with improved understanding of bladder/bowel health, bladder retraining and long-term management. 2. Patient will have resolution of pelvic floor muscle tension to assist with </=2/10 with intercourse  3. Patient will demonstrate improve PFM isolation, coordination and strength so she is able to reduce urinary urge, decrease pelvic pressure and prevent leakage during ADL's.  4. Pt will have increased transverse abdominis muscle strength to 2/5 and hip strength to 5/5 to assist with supporting pelvic floor muscles. 5. PFM contraction before increase intra-abdominal pressure. Plan: Continue plan of care as pt tolerates with focus on pelvic floor muscle rehab. Next visit: biofeedback . Date: 6/27/2023  TX#: 2/10 Date:    7/11/2023              TX#: 3/ Date:    7/18/2023           TX#: 4/ Date:                 TX#: 5/ Date: Tx#: 6/   PFM NM  Re-ed     Bladder diary  Bladder fitness+ handout   PFM NM  Re-ed     [id number]-AK, range of motion , palpation    Posture ed  Transfer ed    Body mechanics with nursing    KT-rectus abdominis w instructions for removal PFM NM  Re-ed     Re-assess range of motion , symptoms    biofeedback -    external sensors were placed and leads were attached  Resting tone  Tonic  Phasic  [place]  With coordinated breathing    kegel +  *Sit<>stand  *March  *Stand    Biofeedback with constant interpretation of results.      Removed adhesive tape    Posture ed  Transfer ed    Body mechanics with nursing        Ther Ex:   MET  Kegel +  iso hip add  10\" x10     Kegel + articulating bridge x10 + iso hip add    Kegel +  U/LE lift  x10     Kegel +  Resisted   ER GTB  x10     Kegel + clamshell GTB x10 R/L      Kegel with plie/ER resisted RTB x10     HEP issued with GTB   Ther Ex:   BKC ball x20  LTR ball x20  Supine-U/LE trunk/neck rotation x20  abdominal bracing + kegel + U/LE lift x10  abdominal bracing + alt LE kick x20  UT stretch c-spine x20 sec r/l  PROM c-spine rotation x10 ea     Ther Ex:     UT stretch c-spine x10 sec r/l  PROM c-spine rotation x10 ea      Manual therapy:   STM L upper trap, levator scap,   SO release x3 min  C-spine distraction x3 min     Manual therapy:   STM L upper trap, levator scap,   SO release x3 min  C-spine distraction x3 min            HEP: Kegel 10 repetitions 3x/day, 10 sec on/10 sec off, 2 sec on 2-4 sec off , diaphragmatic breathing , abdominal bracing  + additions above     Charges: TE 2',-n/c NMR x2 30'  MT 15'    Total Timed Treatment: 45 min  Total Treatment Time: 47 min

## 2023-07-25 ENCOUNTER — OFFICE VISIT (OUTPATIENT)
Dept: PHYSICAL THERAPY | Facility: HOSPITAL | Age: 33
End: 2023-07-25
Attending: OBSTETRICS & GYNECOLOGY
Payer: COMMERCIAL

## 2023-07-25 PROCEDURE — 97110 THERAPEUTIC EXERCISES: CPT

## 2023-07-25 PROCEDURE — 97112 NEUROMUSCULAR REEDUCATION: CPT

## 2023-07-25 NOTE — PROGRESS NOTES
Diagnosis:   Pelvic floor weakness (N81.89)      Referring Provider: Katharina Oglesby  Date of Evaluation:    6/20/2023    Precautions:  None Next MD visit:   none scheduled  Date of Surgery: n/a   Insurance Primary/Secondary: [employer] [place] / N/A     # Auth Visits: no visit limit, no auth            Subjective: Had vaginal pain pinching when sitting yesterday that resolved with shifting her weight. Had a little bit of pain L rectus abdominis pain when moving around. Has some soreness with palpation. No leakage. No nocturia. Neck feeling much better. Initial evaluation symptoms include: urgency/frequency and pressure, vaginal pain    PFDI-20: 70.84/300; Impairment= 24 %-6/20/2023  Pain: 0/10-neck, 0/10 back, 2/10 vaginal region      Objective:  Tx flow sheet   7/25/2023   Diastasis Recti: (finger width depth while contracted)  Above Umbilicus: 2 (was:3)  Umbilicus: 2 (was:3)  Below Umbilicus: 1 (was:3)    1/00/5257   Biofeedback: Recruitment good, holding ability fair with tonic good with phasic, derecruitment fair with tonic good with phasic, baseline between contractions 2.0Ua, baseline resting average flatlineUa (normal 2.0Ua)    Observation: min redness RA from KT, residual adhesive present  Cervical ROM: rotation R 60-pain L 60-stiff, SB R 34-pain, L 37-pain    7/11/2023   Lumbar ROM:flexion 75% ext 75%-pain T/L  Cervical ROM: rotation R 55-pain L 55-stiff, SB R 30-pain, L 37-pain  Posture-rounded shoulders, FHP  Diastasis Recti: (finger width depth while contracted)  Above Umbilicus: 2 1/2 (was:3)  Umbilicus: 2 1/2 (was:3)  Below Umbilicus: 2 1/2 (was:3)  Palpation: tightness/tenderness L UT, L lev scap, L rectus abdominis  Pelvic Alignment: symmetrical (was:L ant ilium, L LE functionally longer)    6/20/2023  URINARY HABITS  Types of symptoms: increased urgency  Events associated with the onset of urinary complaints: birth of child  Urinary Frequency: 3 hours  Bladder irritants: 1 cup decaff, 1/2 glass of wine  Post void dribble: yes  Hovering: yes  Empty bladder just in case: yes      BOWEL HABITS  Types of symptoms: increased urgency   Frequency of bowel movements: daily, more urgent than in the past  Stool consistency: [place] Stool Scale: 2-3    [place] Scale:   x     Discomfort that does not affect completion  Sexual [place] Status: active  Pain with initial and/or deep penetration: both    Posture: rounded shoulders, increased lumbar lordosis      External Palpation: B lumbar paraspinal tightness  Abdominal Wall Integrity: + DR      Range Of Motion  Lumbar AROM screen: wfl except extension 20 degrees-pt reports that she gets dizzy if goes back further  LE AROM screen: grossly WNL     Strength (MMT) 5/5 DELBERT LE except hip abd and ext L 4-/5, hip abd and ext R 4/5  Transverse Abdominis: 1/5    Flexibility Summary: WNL DELBERT LE except hamstrings -10 90/90    Informed consent for internal pelvic evaluation given: Yes    External Observation:   Voluntary contraction: present   Voluntary relaxation: present  Involuntary contraction: present  Involuntary relaxation: present    Mons pubis: WNL  Labia majora: WNL  Labia minora:  WNL  Urethral meatus: WNL  Introitus: other: tenderness  Perineal body: other: tenderness    Internal Examination   Pelvic Floor Muscle strength: (PERF= Power/Endurance/Reps/Fast) MMT: 2/3/3/3    Tissue Laxity Test:  Anterior Wall: Min  Posterior Wall: WNL  Apical: WNL    Eccentric lengthening contraction: wnl  Bearing down Valsalva maneuver (2-3x): + cystocele    Internal Palpation: WNL except Superficial Transverse Perineal - minimal restriction and tenderness  Bulbocavernosus R>L minimal restriction and tenderness  Ischiocavernosus R>L minimal restriction and tenderness  Deep Transverse Perineal - minimal restriction and tenderness  Urethrovaginalis R>L minimal restriction and tenderness  Pubococcygeus/Pubovaginalis R>Lminimal restriction and tenderness  Iliococcygeus R>L moderate restriction and tenderness  Coccygeus R>L moderate restriction and tenderness      Assessment:   Improved coordination and strength of pelvic floor muscles via kegel with fascially connected mm. Closure of RA occurring per notes above. Encouraged activities of daily living (ie transfers) that promote RA closure. Progressed abdominal and core strengthening which pt was able to do without pain and with the ability to maintain neutral spine. Goals:   Goals: (to be met in 10 visits)-progressing  1. Independent in HEP and progression with improved understanding of bladder/bowel health, bladder retraining and long-term management. 2. Patient will have resolution of pelvic floor muscle tension to assist with </=2/10 with intercourse  3. Patient will demonstrate improve PFM isolation, coordination and strength so she is able to reduce urinary urge, decrease pelvic pressure and prevent leakage during ADL's.  4. Pt will have increased transverse abdominis muscle strength to 2/5 and hip strength to 5/5 to assist with supporting pelvic floor muscles. 5. PFM contraction before increase intra-abdominal pressure. Plan: Continue plan of care as pt tolerates with focus on pelvic floor muscle rehab. Next visit: pelvic floor muscles strengthening. Date: 6/27/2023  TX#: 2/10 Date:    7/11/2023              TX#: 3/ Date:    7/18/2023           TX#: 4/ Date:               7/25/2023   TX#: 5/ Date:    Tx#: 6/   PFM NM  Re-ed     Bladder diary  Bladder fitness+ handout   PFM NM  Re-ed     Cm-rxcmnm-SW, range of motion , palpation    Posture ed  Transfer ed    Body mechanics with nursing    KT-rectus abdominis w instructions for removal PFM NM  Re-ed     Re-assess range of motion , symptoms    biofeedback -    external sensors were placed and leads were attached  Resting tone  Tonic  Phasic  [place]  With coordinated breathing    kegel +  *Sit<>stand  *March  *Stand    Biofeedback with constant interpretation of results.      Removed adhesive tape    Posture ed  Transfer ed    Body mechanics with nursing    PFM NM  Re-ed     Re-assess DR-width, depth, rectus abdominis palpation L     RA closure w activities of daily living   Transfer    abdominal bracing retraining        Ther Ex:   MET  Kegel +  iso hip add  10\" x10     Kegel + articulating bridge x10 + iso hip add    Kegel +  U/LE lift  x10     Kegel +  Resisted   ER GTB  x10     Kegel + clamshell GTB x10 R/L      Kegel with plie/ER resisted RTB x10     HEP issued with GTB   Ther Ex:   BKC ball x20  LTR ball x20  Supine-U/LE trunk/neck rotation x20  abdominal bracing + kegel + U/LE lift x10  abdominal bracing + alt LE kick x20  UT stretch c-spine x20 sec r/l  PROM c-spine rotation x10 ea     Ther Ex:     UT stretch c-spine x10 sec r/l  PROM c-spine rotation x10 ea Ther Ex:   NuStep 5min L5     Shuttle- DLP  25# with pelvic brace + iso hip adduction x30 reps     Lifting 10# 12 in step<>waist 2x10    Precor-  abdominal bracing +side step x10 15#    Sit on ball-  Kegel +  U/LE lift  X15    Supine-  Abdominal crunch 2x10    Supine-  LE kick 3x10       Manual therapy:   STM L upper trap, levator scap,   SO release x3 min  C-spine distraction x3 min     Manual therapy:   STM L upper trap, levator scap,   SO release x3 min  C-spine distraction x3 min x           HEP: Kegel 10 repetitions 3x/day, 10 sec on/10 sec off, 2 sec on 2-4 sec off , diaphragmatic breathing , abdominal bracing  + additions above     Charges: TEx2 30', NMR 15'     Total Timed Treatment: 45 min  Total Treatment Time: 45 min

## 2023-08-01 ENCOUNTER — OFFICE VISIT (OUTPATIENT)
Dept: PHYSICAL THERAPY | Facility: HOSPITAL | Age: 33
End: 2023-08-01
Attending: OBSTETRICS & GYNECOLOGY
Payer: COMMERCIAL

## 2023-08-01 PROCEDURE — 97112 NEUROMUSCULAR REEDUCATION: CPT

## 2023-08-01 PROCEDURE — 97110 THERAPEUTIC EXERCISES: CPT

## 2023-08-01 NOTE — PROGRESS NOTES
Diagnosis:   Pelvic floor weakness (N81.89)      Referring Provider: Tati Lees  Date of Evaluation:    6/20/2023    Precautions:  None Next MD visit:   none scheduled  Date of Surgery: n/a   Insurance Primary/Secondary: [employer] [place] PPO / N/A     # Auth Visits: no visit limit, no auth            Subjective:   Abdominal mm feeling more stable. Vaginal pinching 1x/this week. Trying to not \"slip back\" into doing \"JIC\". Had a little bit of pain L rectus abdominis pain when moving around. Has some soreness with palpation. No leakage. No nocturia. No neck pain. Initial evaluation symptoms include: urgency/frequency and pressure, vaginal pain    PFDI-20: 70.84/300; Impairment= 24 %-6/20/2023  Pain: 0/10-neck, 0/10 back, 2/10 vaginal region      Objective:  Tx flow sheet   8/1/2023    Diastasis Recti: (finger width depth while contracted)  Above Umbilicus: 2 (was:3)  Umbilicus: 2 (was:3)  Below Umbilicus: 1 (was:3)    3/52/9373   Biofeedback: Recruitment good, holding ability fair with tonic good with phasic, derecruitment fair with tonic good with phasic, baseline between contractions 2.0Ua, baseline resting average flatlineUa (normal 2.0Ua)    Observation: min redness RA from KT, residual adhesive present  Cervical ROM: rotation R 60-pain L 60-stiff, SB R 34-pain, L 37-pain    7/11/2023   Lumbar ROM:flexion 75% ext 75%-pain T/L  Cervical ROM: rotation R 55-pain L 55-stiff, SB R 30-pain, L 37-pain  Posture-rounded shoulders, FHP  Diastasis Recti: (finger width depth while contracted)  Above Umbilicus: 2 1/2 (was:3)  Umbilicus: 2 1/2 (was:3)  Below Umbilicus: 2 1/2 (was:3)  Palpation: tightness/tenderness L UT, L lev scap, L rectus abdominis  Pelvic Alignment: symmetrical (was:L ant ilium, L LE functionally longer)    6/20/2023  URINARY HABITS  Types of symptoms: increased urgency  Events associated with the onset of urinary complaints: birth of child  Urinary Frequency: 3 hours  Bladder irritants: 1 cup decaff, 1/2 glass of wine  Post void dribble: yes  Hovering: yes  Empty bladder just in case: yes      BOWEL HABITS  Types of symptoms: increased urgency   Frequency of bowel movements: daily, more urgent than in the past  Stool consistency: [place] Stool Scale: 2-3    [place] Scale:   x     Discomfort that does not affect completion  Sexual [place] Status: active  Pain with initial and/or deep penetration: both    Posture: rounded shoulders, increased lumbar lordosis      External Palpation: B lumbar paraspinal tightness  Abdominal Wall Integrity: + DR      Range Of Motion  Lumbar AROM screen: wfl except extension 20 degrees-pt reports that she gets dizzy if goes back further  LE AROM screen: grossly WNL     Strength (MMT) 5/5 DELBERT LE except hip abd and ext L 4-/5, hip abd and ext R 4/5  Transverse Abdominis: 1/5    Flexibility Summary: WNL DELBERT LE except hamstrings -10 90/90    Informed consent for internal pelvic evaluation given: Yes    External Observation:   Voluntary contraction: present   Voluntary relaxation: present  Involuntary contraction: present  Involuntary relaxation: present    Mons pubis: WNL  Labia majora: WNL  Labia minora:  WNL  Urethral meatus: WNL  Introitus: other: tenderness  Perineal body: other: tenderness    Internal Examination   Pelvic Floor Muscle strength: (PERF= Power/Endurance/Reps/Fast) MMT: 2/3/3/3    Tissue Laxity Test:  Anterior Wall: Min  Posterior Wall: WNL  Apical: WNL    Eccentric lengthening contraction: wnl  Bearing down Valsalva maneuver (2-3x): + cystocele    Internal Palpation: WNL except Superficial Transverse Perineal - minimal restriction and tenderness  Bulbocavernosus R>L minimal restriction and tenderness  Ischiocavernosus R>L minimal restriction and tenderness  Deep Transverse Perineal - minimal restriction and tenderness  Urethrovaginalis R>L minimal restriction and tenderness  Pubococcygeus/Pubovaginalis R>Lminimal restriction and tenderness  Iliococcygeus R>L moderate restriction and tenderness  Coccygeus R>L moderate restriction and tenderness      Assessment:   Improved coordination and strength of pelvic floor muscles via kegel with fascially connected mm. Pt able to perform abdominal bracing with ES to promote closure with ability to maintain neutral spine. Encouraged activities of daily living (ie transfers) that promote RA closure. Pt performed transferring correctly to promote closure of RA. Added: KT again to promote closure with instructions regarding length of time w tape and how to remove to avoid skin irritation which pt had in the past from possibly wearing tape for extended period of time. Goals:   Goals: (to be met in 10 visits)-progressing  1. Independent in HEP and progression with improved understanding of bladder/bowel health, bladder retraining and long-term management. 2. Patient will have resolution of pelvic floor muscle tension to assist with </=2/10 with intercourse  3. Patient will demonstrate improve PFM isolation, coordination and strength so she is able to reduce urinary urge, decrease pelvic pressure and prevent leakage during ADL's.  4. Pt will have increased transverse abdominis muscle strength to 2/5 and hip strength to 5/5 to assist with supporting pelvic floor muscles. 5. PFM contraction before increase intra-abdominal pressure. Plan: Continue plan of care as pt tolerates with focus on pelvic floor muscle rehab. Next visit: pelvic floor muscles strengthening-cont, f/u KT  Date: 6/27/2023  TX#: 2/10 Date:    7/11/2023              TX#: 3/ Date:    7/18/2023           TX#: 4/ Date:               7/25/2023   TX#: 5/ Date: 8/1/2023   Tx#: 6/ Date: Tx#:  Date:    Tx#:    PFM NM  Re-ed     Bladder diary  Bladder fitness+ handout   PFM NM  Re-ed     En-bfynmx-IC, range of motion , palpation    Posture ed  Transfer ed    Body mechanics with nursing    KT-rectus abdominis w instructions for removal PFM NM  Re-ed     Re-assess range of motion , symptoms    biofeedback -    external sensors were placed and leads were attached  Resting tone  Tonic  Phasic  [place]  With coordinated breathing    kegel +  *Sit<>stand  *March  *Stand    Biofeedback with constant interpretation of results.      Removed adhesive tape    Posture ed  Transfer ed    Body mechanics with nursing    PFM NM  Re-ed     Re-assess DR-width, depth, rectus abdominis palpation L     RA closure w activities of daily living   Transfer    abdominal bracing retraining     PFM NM  Re-ed     Re-assess DR-width, depth, rectus abdominis palpation L     RA closure w activities of daily living   Transfer    abdominal bracing retraining    Russian stim 5/5  W  abdominal bracing   abdominal bracing +march  abdominal bracing + alt LE kick   x10 min    KT-RA    Reviewed correct techniques to remove tape     Ther Ex:   MET  Kegel +  iso hip add  10\" x10     Kegel + articulating bridge x10 + iso hip add    Kegel +  U/LE lift  x10     Kegel +  Resisted   ER GTB  x10     Kegel + clamshell GTB x10 R/L      Kegel with plie/ER resisted RTB x10     HEP issued with GTB   Ther Ex:   BKC ball x20  LTR ball x20  Supine-U/LE trunk/neck rotation x20  abdominal bracing + kegel + U/LE lift x10  abdominal bracing + alt LE kick x20  UT stretch c-spine x20 sec r/l  PROM c-spine rotation x10 ea     Ther Ex:     UT stretch c-spine x10 sec r/l  PROM c-spine rotation x10 ea Ther Ex:   NuStep 5min L5     Shuttle- DLP  25# with pelvic brace + iso hip adduction x30 reps     Lifting 10# 12 in step<>waist 2x10    Precor-  abdominal bracing +side step x10 15#    Sit on ball-  Kegel +  U/LE lift  X15    Supine-  Abdominal crunch 2x10    Supine-  LE kick 3x10   Ther Ex:   abdominal bracing +  Clamshell RTB x20 R/L    Standing, sitting, supine kegel's tonic and phasic    Reviewed HEP            Manual therapy:   STM L upper trap, levator scap,   SO release x3 min  C-spine distraction x3 min Manual therapy:   STM L upper trap, levator scap,   SO release x3 min  C-spine distraction x3 min x               HEP: Kegel 10 repetitions 3x/day, 10 sec on/10 sec off, 2 sec on 2-4 sec off , diaphragmatic breathing , abdominal bracing  + additions above     Charges: TEx1 15', NMRx2 30'  -w ES   Total Timed Treatment: 45 min  Total Treatment Time: 45 min

## 2023-08-15 ENCOUNTER — OFFICE VISIT (OUTPATIENT)
Dept: PHYSICAL THERAPY | Facility: HOSPITAL | Age: 33
End: 2023-08-15
Attending: OBSTETRICS & GYNECOLOGY
Payer: COMMERCIAL

## 2023-08-15 PROCEDURE — 97112 NEUROMUSCULAR REEDUCATION: CPT

## 2023-08-15 PROCEDURE — 97140 MANUAL THERAPY 1/> REGIONS: CPT

## 2023-08-15 PROCEDURE — 97110 THERAPEUTIC EXERCISES: CPT

## 2023-08-15 NOTE — PROGRESS NOTES
Diagnosis:   Pelvic floor weakness (N81.89)      Referring Provider: Benson Cazares  Date of Evaluation:    6/20/2023    Precautions:  None Next MD visit:   none scheduled  Date of Surgery: n/a   Insurance Primary/Secondary: [employer] [place] PPO / N/A     # Auth Visits: no visit limit, no auth            Subjective:   Abdominal mm feeling more stable. No Vaginal pinching when sitting. Had some vaginal pain at the introitus almost the whole time during intercourse and some pain at deeper mm level. Trying to not \"slip back\" into doing \"JIC\". Had a little bit of pain L rectus abdominis pain when moving around. Has some soreness with palpation. No leakage. No nocturia. No neck pain. KT helped a lot with abdominal/core support but had skin irritation again. Initial evaluation symptoms include: urgency/frequency and pressure, vaginal pain    PFDI-20: 70.84/300; Impairment= 24 %-6/20/2023  Pain: 0/10-neck, 0/10 back, 2/10 vaginal region      Objective: Tx flow sheet   8/15/2023  Increase tension and pain introitus R>L, superficial and deep pelvic floor muscles R>>L  No skin irritation observed.     8/1/2023    Diastasis Recti: (finger width depth while contracted)  Above Umbilicus: 2 (was:3)  Umbilicus: 2 (was:3)  Below Umbilicus: 1 (was:3)    9/93/4842   Biofeedback: Recruitment good, holding ability fair with tonic good with phasic, derecruitment fair with tonic good with phasic, baseline between contractions 2.0Ua, baseline resting average flatlineUa (normal 2.0Ua)    Observation: min redness RA from KT, residual adhesive present  Cervical ROM: rotation R 60-pain L 60-stiff, SB R 34-pain, L 37-pain    7/11/2023   Lumbar ROM:flexion 75% ext 75%-pain T/L  Cervical ROM: rotation R 55-pain L 55-stiff, SB R 30-pain, L 37-pain  Posture-rounded shoulders, FHP  Diastasis Recti: (finger width depth while contracted)  Above Umbilicus: 2 1/2 (was:3)  Umbilicus: 2 1/2 (was:3)  Below Umbilicus: 2 1/2 (was:3)  Palpation: tightness/tenderness L UT, L lev scap, L rectus abdominis  Pelvic Alignment: symmetrical (was:L ant ilium, L LE functionally longer)    6/20/2023  URINARY HABITS  Types of symptoms: increased urgency  Events associated with the onset of urinary complaints: birth of child  Urinary Frequency: 3 hours  Bladder irritants: 1 cup decaff, 1/2 glass of wine  Post void dribble: yes  Hovering: yes  Empty bladder just in case: yes      BOWEL HABITS  Types of symptoms: increased urgency   Frequency of bowel movements: daily, more urgent than in the past  Stool consistency: [place] Stool Scale: 2-3    [place] Scale:   x     Discomfort that does not affect completion  Sexual [place] Status: active  Pain with initial and/or deep penetration: both    Posture: rounded shoulders, increased lumbar lordosis      External Palpation: B lumbar paraspinal tightness  Abdominal Wall Integrity: + DR      Range Of Motion  Lumbar AROM screen: wfl except extension 20 degrees-pt reports that she gets dizzy if goes back further  LE AROM screen: grossly WNL     Strength (MMT) 5/5 DELBERT LE except hip abd and ext L 4-/5, hip abd and ext R 4/5  Transverse Abdominis: 1/5    Flexibility Summary: WNL DELBERT LE except hamstrings -10 90/90    Informed consent for internal pelvic evaluation given: Yes    External Observation:   Voluntary contraction: present   Voluntary relaxation: present  Involuntary contraction: present  Involuntary relaxation: present    Mons pubis: WNL  Labia majora: WNL  Labia minora:  WNL  Urethral meatus: WNL  Introitus: other: tenderness  Perineal body: other: tenderness    Internal Examination   Pelvic Floor Muscle strength: (PERF= Power/Endurance/Reps/Fast) MMT: 2/3/3/3    Tissue Laxity Test:  Anterior Wall: Min  Posterior Wall: WNL  Apical: WNL    Eccentric lengthening contraction: wnl  Bearing down Valsalva maneuver (2-3x): + cystocele    Internal Palpation: WNL except Superficial Transverse Perineal - minimal restriction and tenderness  Bulbocavernosus R>L minimal restriction and tenderness  Ischiocavernosus R>L minimal restriction and tenderness  Deep Transverse Perineal - minimal restriction and tenderness  Urethrovaginalis R>L minimal restriction and tenderness  Pubococcygeus/Pubovaginalis R>Lminimal restriction and tenderness  Iliococcygeus R>L moderate restriction and tenderness  Coccygeus R>L moderate restriction and tenderness      Assessment:   Almost full resolution of pelvic floor muscles tension with internal releases with pelvic drop exercises. Pt appears to understand incorporating dilator/pelvic wand and abdominal binder to address pain and to improve support, respectively, with activities of daily living , including intercourse. Goals:   Goals: (to be met in 10 visits)-progressing  1. Independent in HEP and progression with improved understanding of bladder/bowel health, bladder retraining and long-term management. 2. Patient will have resolution of pelvic floor muscle tension to assist with </=2/10 with intercourse  3. Patient will demonstrate improve PFM isolation, coordination and strength so she is able to reduce urinary urge, decrease pelvic pressure and prevent leakage during ADL's.  4. Pt will have increased transverse abdominis muscle strength to 2/5 and hip strength to 5/5 to assist with supporting pelvic floor muscles. 5. PFM contraction before increase intra-abdominal pressure. Plan: Continue plan of care as pt tolerates with focus on pelvic floor muscle rehab. Next visit: pelvic floor muscle/RA retraining  Date: 6/27/2023  TX#: 2/10 Date:    7/11/2023              TX#: 3/ Date:    7/18/2023           TX#: 4/ Date:               7/25/2023   TX#: 5/ Date: 8/1/2023   Tx#: 6/ Date: 8/15/2023   Tx#: 7/ Date: Tx#:  Date:    Tx#:    PFM NM  Re-ed     Bladder diary  Bladder fitness+ handout   PFM NM  Re-ed     [id number], range of motion , palpation    Posture ed  Transfer ed    Body mechanics with nursing    KT-rectus abdominis w instructions for removal PFM NM  Re-ed     Re-assess range of motion , symptoms    biofeedback -    external sensors were placed and leads were attached  Resting tone  Tonic  Phasic  [place]  With coordinated breathing    kegel +  *Sit<>stand  *March  *Stand    Biofeedback with constant interpretation of results.      Removed adhesive tape    Posture ed  Transfer ed    Body mechanics with nursing    PFM NM  Re-ed     Re-assess DR-width, depth, rectus abdominis palpation L     RA closure w activities of daily living   Transfer    abdominal bracing retraining     PFM NM  Re-ed     Re-assess DR-width, depth, rectus abdominis palpation L     RA closure w activities of daily living   Transfer    abdominal bracing retraining    Russian stim 5/5  W  abdominal bracing   abdominal bracing +march  abdominal bracing + alt LE kick   x10 min    KT-RA    Reviewed correct techniques to remove tape PFM NM  Re-ed     Binder suggestions    Pelvic wand and dilator discussion        Re-assess DR-width, depth, rectus abdominis palpation L     RA closure w activities of daily living   Transfer    abdominal bracing retraining    [place] strategies         Ther Ex:   MET  Kegel +  iso hip add  10\" x10     Kegel + articulating bridge x10 + iso hip add    Kegel +  U/LE lift  x10     Kegel +  Resisted   ER GTB  x10     Kegel + clamshell GTB x10 R/L      Kegel with plie/ER resisted RTB x10     HEP issued with GTB   Ther Ex:   BKC ball x20  LTR ball x20  Supine-U/LE trunk/neck rotation x20  abdominal bracing + kegel + U/LE lift x10  abdominal bracing + alt LE kick x20  UT stretch c-spine x20 sec r/l  PROM c-spine rotation x10 ea     Ther Ex:     UT stretch c-spine x10 sec r/l  PROM c-spine rotation x10 ea Ther Ex:   NuStep 5min L5     Shuttle- DLP  25# with pelvic brace + iso hip adduction x30 reps     Lifting 10# 12 in step<>waist 2x10    Precor-  abdominal bracing +side step x10 15#    Sit on ball-  Kegel +  U/LE lift  X15    Supine-  Abdominal crunch 2x10    Supine-  LE kick 3x10   Ther Ex:   abdominal bracing +  Clamshell RTB x20 R/L    Standing, sitting, supine kegel's tonic and phasic    Reviewed HEP       Ther Ex:     Pelvic drop-  Various strategies/instructions  Hip adductor stretch x1'  [place] x1'  diaphragmatic breathing           Manual therapy:   STM L upper trap, levator scap,   SO release x3 min  C-spine distraction x3 min     Manual therapy:   STM L upper trap, levator scap,   SO release x3 min  C-spine distraction x3 min x  Manual therapy:   Internal pelvic floor muscle releases R>>L with verbal cueing for pelvic floor muscles lengthening, functional manual release , hip adductor stretch               HEP: Kegel 10 repetitions 3x/day, 10 sec on/10 sec off, 2 sec on 2-4 sec off , diaphragmatic breathing , abdominal bracing  + additions above     Charges: TEx1 15', NMRx 15', MT 15'  Total Timed Treatment: 45 min  Total Treatment Time: 45 min

## 2023-08-22 ENCOUNTER — OFFICE VISIT (OUTPATIENT)
Dept: PHYSICAL THERAPY | Facility: HOSPITAL | Age: 33
End: 2023-08-22
Attending: OBSTETRICS & GYNECOLOGY
Payer: COMMERCIAL

## 2023-08-22 PROCEDURE — 97112 NEUROMUSCULAR REEDUCATION: CPT

## 2023-08-22 PROCEDURE — 97110 THERAPEUTIC EXERCISES: CPT

## 2023-08-22 NOTE — PROGRESS NOTES
Diagnosis:   Pelvic floor weakness (N81.89)      Referring Provider: Tracy Payne  Date of Evaluation:    6/20/2023    Precautions:  None Next MD visit:   none scheduled  Date of Surgery: n/a   Insurance Primary/Secondary: [employer] [place] PPO / N/A     # Auth Visits: no visit limit, no auth            Subjective:   Abdominal mm feeling more stable. Plans on getting a binder. No vaginal pinching when sitting anymore. Some times has to push around rectum when having #1-2 bowel. Had some vaginal pain at the introitus almost the whole time during intercourse and some pain at deeper mm level. Not active sexually this week. Trying to not \"slip back\" into doing \"JIC\". Had a little bit of pain L rectus abdominis pain when moving around. Has some soreness with palpation. No leakage. No nocturia. No neck pain. No vaginal pressure. Not sure she is fully emptying bladder some times. Frequency fluctuates. 5 min late    Initial evaluation symptoms include: urgency/frequency and pressure, vaginal pain    PFDI-20: 42.8/300 (was:70.84/300); Impairment= 14% (was:24 %)-8/22/2023     Pain: 0/10-neck, 0/10 back, 2/10 vaginal region      Objective: Tx flow sheet   8/22/2023     8/15/2023  Increase tension and pain introitus R>L, superficial and deep pelvic floor muscles R>>L  No skin irritation observed.     8/1/2023    Diastasis Recti: (finger width depth while contracted)  Above Umbilicus: 2 (was:3)  Umbilicus: 2 (was:3)  Below Umbilicus: 1 (was:3)    8/67/7027   Biofeedback: Recruitment good, holding ability fair with tonic good with phasic, derecruitment fair with tonic good with phasic, baseline between contractions 2.0Ua, baseline resting average flatlineUa (normal 2.0Ua)    Observation: min redness RA from KT, residual adhesive present  Cervical ROM: rotation R 60-pain L 60-stiff, SB R 34-pain, L 37-pain    7/11/2023   Lumbar ROM:flexion 75% ext 75%-pain T/L  Cervical ROM: rotation R 55-pain L 55-stiff, SB R 30-pain, L 37-pain  Posture-rounded shoulders, FHP  Diastasis Recti: (finger width depth while contracted)  Above Umbilicus: 2 1/2 (was:3)  Umbilicus: 2 1/2 (was:3)  Below Umbilicus: 2 1/2 (was:3)  Palpation: tightness/tenderness L UT, L lev scap, L rectus abdominis  Pelvic Alignment: symmetrical (was:L ant ilium, L LE functionally longer)    6/20/2023  URINARY HABITS  Types of symptoms: increased urgency  Events associated with the onset of urinary complaints: birth of child  Urinary Frequency: 3 hours  Bladder irritants: 1 cup decaff, 1/2 glass of wine  Post void dribble: yes  Hovering: yes  Empty bladder just in case: yes      BOWEL HABITS  Types of symptoms: increased urgency   Frequency of bowel movements: daily, more urgent than in the past  Stool consistency: [place] Stool Scale: #1-2 (was:2-3)-8/22/2023     SEXUAL HEALTH STATUS  Marinoff Scale:   x     Discomfort that does not affect completion  Sexual Ault Status: active  Pain with initial and/or deep penetration: both    Posture: rounded shoulders, increased lumbar lordosis      External Palpation: B lumbar paraspinal tightness  Abdominal Wall Integrity: + DR      Range Of Motion  Lumbar AROM screen: wfl except extension 20 degrees-pt reports that she gets dizzy if goes back further  LE AROM screen: grossly WNL     Strength (MMT) 5/5 DELBERT LE except hip abd and ext L 4-/5, hip abd and ext R 4/5  Transverse Abdominis: 1/5    Flexibility Summary: WNL DELBERT LE except hamstrings -10 90/90    Informed consent for internal pelvic evaluation given: Yes    External Observation:   Voluntary contraction: present   Voluntary relaxation: present  Involuntary contraction: present  Involuntary relaxation: present    Mons pubis: WNL  Labia majora: WNL  Labia minora:  WNL  Urethral meatus: WNL  Introitus: other: tenderness  Perineal body: other: tenderness    Internal Examination   Pelvic Floor Muscle strength: (PERF= Power/Endurance/Reps/Fast) MMT: 2/3/3/3    Tissue Laxity Test:  Anterior Wall: Min  Posterior Wall: WNL  Apical: WNL    Eccentric lengthening contraction: wnl  Bearing down Valsalva maneuver (2-3x): + cystocele    Internal Palpation: WNL except   Superficial Transverse Perineal - minimal restriction and tenderness  Bulbocavernosus R>L minimal restriction and tenderness  Ischiocavernosus R>L minimal restriction and tenderness  Deep Transverse Perineal - minimal restriction and tenderness  Urethrovaginalis R>L minimal restriction and tenderness  Pubococcygeus/Pubovaginalis R>Lminimal restriction and tenderness  Iliococcygeus R>L moderate restriction and tenderness  Coccygeus R>L moderate restriction and tenderness      Assessment:   Almost full resolution of pelvic floor muscles tension with internal releases with pelvic drop exercises. Pt appears to understand incorporating dilator/pelvic wand and abdominal binder to address pain and to improve support, respectively, with activities of daily living , including intercourse. Goals:   Goals: (to be met in 10 visits)-progressing  1. Independent in HEP and progression with improved understanding of bladder/bowel health, bladder retraining and long-term management. 2. Patient will have resolution of pelvic floor muscle tension to assist with </=2/10 with intercourse  3. Patient will demonstrate improve PFM isolation, coordination and strength so she is able to reduce urinary urge, decrease pelvic pressure and prevent leakage during ADL's.  4. Pt will have increased transverse abdominis muscle strength to 2/5 and hip strength to 5/5 to assist with supporting pelvic floor muscles. 5. PFM contraction before increase intra-abdominal pressure. Plan: Continue plan of care as pt tolerates with focus on pelvic floor muscle rehab.  Next visit: pelvic floor muscle/RA retraining, possible discharge  Date:    7/11/2023              TX#: 3/ Date:    7/18/2023           TX#: 4/ Date:               7/25/2023   TX#: 5/ Date: 8/1/2023   Tx#: 6/ Date: 8/15/2023   Tx#: 7/ Date: 8/22/2023   Tx#: 8/ Date: Tx#:    PFM NM  Re-ed     Pf-erumfm-XG, range of motion , palpation    Posture ed  Transfer ed    Body mechanics with nursing    KT-rectus abdominis w instructions for removal PFM NM  Re-ed     Re-assess range of motion , symptoms    biofeedback -    external sensors were placed and leads were attached  Resting tone  Tonic  Phasic  [place]  With coordinated breathing    kegel +  *Sit<>stand  *March  *Stand    Biofeedback with constant interpretation of results. Removed adhesive tape    Posture ed  Transfer ed    Body mechanics with nursing    PFM NM  Re-ed     Re-assess DR-width, depth, rectus abdominis palpation L     RA closure w activities of daily living   Transfer    abdominal bracing retraining     PFM NM  Re-ed     Re-assess DR-width, depth, rectus abdominis palpation L     RA closure w activities of daily living   Transfer    abdominal bracing retraining    Russian stim 5/5  W  abdominal bracing   abdominal bracing +march  abdominal bracing + alt LE kick   x10 min    KT-RA    Reviewed correct techniques to remove tape PFM NM  Re-ed     Binder suggestions    Pelvic wand and dilator discussion        Re-assess DR-width, depth, rectus abdominis palpation L     RA closure w activities of daily living   Transfer    abdominal bracing retraining    [place] strategies     PFM NM  Re-ed     PFDI scoring and interpretion with patient strategies to reduce impairments.   Pt education:    Hydration  Diet  voiding    Bladder/bowel habits  Issued handouts for urine and bowel voiding with education    Binder suggestions-reviewed    Pelvic wand and dilator discussion        RA closure w activities of daily living   Transfer    abdominal bracing retraining    [place] strategies    Ther Ex:   BKC ball x20  LTR ball x20  Supine-U/LE trunk/neck rotation x20  abdominal bracing + kegel + U/LE lift x10  abdominal bracing + alt LE kick x20  UT stretch c-spine x20 sec r/l  PROM c-spine rotation x10 ea     Ther Ex:     UT stretch c-spine x10 sec r/l  PROM c-spine rotation x10 ea Ther Ex:   NuStep 5min L5     Shuttle- DLP  25# with pelvic brace + iso hip adduction x30 reps     Lifting 10# 12 in step<>waist 2x10    Precor-  abdominal bracing +side step x10 15#    Sit on ball-  Kegel +  U/LE lift  X15    Supine-  Abdominal crunch 2x10    Supine-  LE kick 3x10   Ther Ex:   abdominal bracing +  Clamshell RTB x20 R/L    Standing, sitting, supine kegel's tonic and phasic    Reviewed HEP       Ther Ex:     Pelvic drop-  Various strategies/instructions  Hip adductor stretch x1'  Happy baby x1'  diaphragmatic breathing      Ther Ex:     Pelvic drop-  Sit on ball    Various strategies/instructions  Standing-  Kegel + iso hip add + squat w ball behind back x20     Hip adductor stretch x1'  Happy baby x1' dynamic  diaphragmatic breathing     Manual therapy:   STM L upper trap, levator scap,   SO release x3 min  C-spine distraction x3 min     Manual therapy:   STM L upper trap, levator scap,   SO release x3 min  C-spine distraction x3 min x  Manual therapy:   Internal pelvic floor muscle releases R>>L with verbal cueing for pelvic floor muscles lengthening, functional manual release , hip adductor stretch Manual therapy:   -             HEP: Kegel 10 repetitions 3x/day, 10 sec on/10 sec off, 2 sec on 2-4 sec off , diaphragmatic breathing , abdominal bracing  + additions above     Charges: TEx1 15', NMRx2 30' Total Timed Treatment: 40 min  Total Treatment Time: 40 min

## 2023-08-29 ENCOUNTER — APPOINTMENT (OUTPATIENT)
Dept: PHYSICAL THERAPY | Facility: HOSPITAL | Age: 33
End: 2023-08-29
Attending: OBSTETRICS & GYNECOLOGY
Payer: COMMERCIAL

## 2023-09-05 ENCOUNTER — OFFICE VISIT (OUTPATIENT)
Dept: PHYSICAL THERAPY | Facility: HOSPITAL | Age: 33
End: 2023-09-05
Attending: OBSTETRICS & GYNECOLOGY
Payer: COMMERCIAL

## 2023-09-05 ENCOUNTER — HOSPITAL ENCOUNTER (OUTPATIENT)
Age: 33
Discharge: HOME | End: 2023-09-05
Attending: EMERGENCY MEDICINE
Payer: COMMERCIAL

## 2023-09-05 VITALS
OXYGEN SATURATION: 99 % | HEIGHT: 63 IN | TEMPERATURE: 98 F | HEART RATE: 76 BPM | SYSTOLIC BLOOD PRESSURE: 109 MMHG | RESPIRATION RATE: 16 BRPM | DIASTOLIC BLOOD PRESSURE: 57 MMHG | BODY MASS INDEX: 24.8 KG/M2 | WEIGHT: 140 LBS

## 2023-09-05 DIAGNOSIS — J06.9 VIRAL UPPER RESPIRATORY TRACT INFECTION: Primary | ICD-10-CM

## 2023-09-05 LAB
S PYO AG THROAT QL IA.RAPID: NEGATIVE
SARS-COV-2 RNA RESP QL NAA+PROBE: NOT DETECTED

## 2023-09-05 PROCEDURE — 99213 OFFICE O/P EST LOW 20 MIN: CPT

## 2023-09-05 PROCEDURE — 99212 OFFICE O/P EST SF 10 MIN: CPT

## 2023-09-05 PROCEDURE — 97110 THERAPEUTIC EXERCISES: CPT

## 2023-09-05 PROCEDURE — 97112 NEUROMUSCULAR REEDUCATION: CPT

## 2023-09-05 PROCEDURE — 87651 STREP A DNA AMP PROBE: CPT | Performed by: EMERGENCY MEDICINE

## 2023-09-05 PROCEDURE — 97140 MANUAL THERAPY 1/> REGIONS: CPT

## 2023-09-05 NOTE — ED INITIAL ASSESSMENT (HPI)
Pt with sore throat since Sat, congestion, sneezing, runny nose, sinus pressure headache   since yesterday    No fever/sob

## 2023-09-05 NOTE — DISCHARGE INSTRUCTIONS
Tylenol or ibuprofen as needed.   You should avoid over-the-counter decongestants the cough and expectorant such as Robitussin-DM or Mucinex DM are okay during breast-feeding

## 2023-09-05 NOTE — PROGRESS NOTES
Discharge Summary  Pt has attended 9 visits in Physical Therapy. Diagnosis:   Pelvic floor weakness (N81.89)      Referring Provider: Faith Cline  Date of Evaluation:    6/20/2023    Precautions:  None Next MD visit:   none scheduled  Date of Surgery: n/a   Insurance Primary/Secondary: [employer] [place] P[place] / N/A     # Auth Visits: no visit limit, no auth            Subjective:   Abdominal mm feeling more strong. Walking a few times/week 1/2 mile each. Planning on doing [place] work out with friend. Got abdominal binder and wants to make sure she is wearing correctly as well as when to wear. No vaginal pinching when sitting anymore. Some times has to push around rectum when having #1-2 bowel. Less pain with intercourse. Pt reports that she feels confident to continue strategies to resolve pain with intercourse. No increased frequency/urgency or vaginal pressure. No leakage, nocturia. Fitness goals: returning to walking, 1/2 AND FULL [place]-PT REPORTS THAT SHE FEELS CONFIDENT TO PROGRESS WALKING>RUNNING    PFDI-20: 42.8/300 (was:70.84/300); Impairment= 14% (was:24 %)-8/22/2023     Pain: 0/10-neck, 0/10 back, 2/10 vaginal region      Objective:  Tx flow sheet     Diastasis Recti: (finger width depth while contracted)  Above Umbilicus:  1 1/2 (was:3)  Umbilicus: 1 1/2 (was:3)  Below Umbilicus: 1/2 (was:3)    URINARY HABITS  Types of symptoms: RESOLVED (WAS:increased urgency)  Urinary Frequency: 3 hours  Post void dribble: NO (WAS:yes)  Hovering: NO (WAS:yes)  Empty bladder just in case: NO (WAS:yes)      BOWEL HABITS  Types of symptoms: NO increased urgency   Stool consistency: [place] Stool Scale: VARIABLE#1-3 (was:2-3)    SEXUAL HEALTH STATUS  Marinoff Scale:   x     Discomfort that does not affect completion  Sexual [place] Status: active  Pain with initial and/or deep penetration: both    Posture: MORE NEUTRAL POSITIONING-INCREASED SCAPULAR RETRACTION/DEPRESSION AND PELVIS, DECREASED FHP (WAS:rounded shoulders, increased lumbar lordosis)    External Palpation: B lumbar paraspinal tightness-RESOLVED  Abdominal Wall Integrity: + DR      Range Of Motion  Lumbar AROM screen: wfl except extension 20 degrees-pt reports that she gets dizzy if goes back further  LE AROM screen: grossly WNL   Pelvic Alignment: symmetrical (was:L ant ilium, L LE functionally longer)    Strength (MMT) 5/5 DELBERT LE except hip abd and ext B 5-/5 (WAS:L 4-/5, hip abd and ext R 4/5)  Transverse Abdominis: 2/5 (WAS:1/5)    Flexibility Summary: WNL DELBERT LE except hamstrings -10 90/90    Informed consent for internal pelvic evaluation given: Yes    External Observation:   Voluntary contraction: present   Voluntary relaxation: present  Involuntary contraction: present  Involuntary relaxation: present    Mons pubis: WNL  Labia majora: WNL  Labia minora:  WNL  Urethral meatus: WNL  Introitus: other: WNL (WAS:tenderness)  Perineal body: WNL (WAS:tenderness)    Internal Examination   Pelvic Floor Muscle strength: (PERF= Power/Endurance/Reps/Fast) MMT: 3/6/6/6 (WAS:2/3/3/3)    Tissue Laxity Test:  Anterior Wall: Min  Posterior Wall: WNL  Apical: WNL    Eccentric lengthening contraction: wnl  Bearing down Valsalva maneuver (2-3x): + cystocele    Internal Palpation: WNL except   Superficial Transverse Perineal - RESOLUTION (WAS:minimal) restriction and tenderness  Bulbocavernosus R>L RESOLUTION (WAS: minimal) restriction and tenderness  Ischiocavernosus R>L RESOLUTION (WAS:minimal) restriction and tenderness  Deep Transverse Perineal - RESOLUTION (WAS:minimal) restriction and tenderness  Urethrovaginalis R>L RESOLUTION (WAS: minimal) restriction and tenderness  Pubococcygeus/Pubovaginalis R>L RESOLUTION (WAS:minimal) restriction and tenderness  Iliococcygeus R>L RESOLUTION (WAS:moderate) restriction and tenderness  Coccygeus R>L RESOLUTION (WAS:moderate) restriction and tenderness    7/18/2023   Biofeedback: Recruitment good, holding ability fair with tonic good with phasic, derecruitment fair with tonic good with phasic, baseline between contractions 2.0Ua, baseline resting average flatlineUa (normal 2.0Ua)      Assessment:   Pt has made significant improvement in physical therapy with improved bowel/bladder habits, resolution pelvic floor muscles tension after Manual therapy which pt plans to continue to assist with less pain/resolve with intercourse with increased abdominal and hip strength, and function with preventing leakage. Pt appears to understand incorporating dilator/pelvic wand and abdominal binder to address pain and to improve support, respectively, with activities of daily living , including intercourse. All goals met. Pt motivated to continue HEP. Goals:   Goals: (to be met in 10 visits)-  1. Independent in HEP and progression with improved understanding of bladder/bowel health, bladder retraining and long-term management. -MET  2. Patient will have resolution of pelvic floor muscle tension to assist with </=2/10 with intercourse-MET  3. Patient will demonstrate improve PFM isolation, coordination and strength so she is able to reduce urinary urge, decrease pelvic pressure and prevent leakage during ADL's.-MET  4. Pt will have increased transverse abdominis muscle strength to 2/5 and hip strength to 5/5 to assist with supporting pelvic floor muscles. -MET  5. PFM contraction before increase intra-abdominal pressure. -MET    Plan: Pt considered discharged from physical therapy. Pt instructed to call clinic if he/she has any further questions and to continue home exercise program.     Patient/Family/Caregiver was advised of these findings, precautions, and treatment options and has agreed to actively participate in planning and for this course of care. Thank you for your referral. If you have any questions, please contact me at Dept: [phone].     Sincerely,  Electronically signed by therapist: Zachary Hendricks, PT              Date:    7/18/2023 TX#: 4/ Date:               7/25/2023   TX#: 5/ Date: 8/1/2023   Tx#: 6/ Date: 8/15/2023   Tx#: 7/ Date: 8/22/2023   Tx#: 8/ Date: 9/5/2023   Tx#: 9/   PFM NM  Re-ed     Re-assess range of motion , symptoms    biofeedback -    external sensors were placed and leads were attached  Resting tone  Tonic  Phasic  [place]  With coordinated breathing    kegel +  *Sit<>stand  *March  *Stand    Biofeedback with constant interpretation of results. Removed adhesive tape    Posture ed  Transfer ed    Body mechanics with nursing    PFM NM  Re-ed     Re-assess DR-width, depth, rectus abdominis palpation L     RA closure w activities of daily living   Transfer    abdominal bracing retraining     PFM NM  Re-ed     Re-assess DR-width, depth, rectus abdominis palpation L     RA closure w activities of daily living   Transfer    abdominal bracing retraining    Russian stim 5/5  W  abdominal bracing   abdominal bracing +march  abdominal bracing + alt LE kick   x10 min    KT-RA    Reviewed correct techniques to remove tape PFM NM  Re-ed     Binder suggestions    Pelvic wand and dilator discussion        Re-assess DR-width, depth, rectus abdominis palpation L     RA closure w activities of daily living   Transfer    abdominal bracing retraining    [place] strategies     PFM NM  Re-ed     PFDI scoring and interpretion with patient strategies to reduce impairments.   Pt education:    Hydration  Diet  voiding    Bladder/bowel habits  Issued handouts for urine and bowel voiding with education    Binder suggestions-reviewed    Pelvic wand and dilator discussion        RA closure w activities of daily living   Transfer    abdominal bracing retraining    [place] strategies PFM NM  Re-ed     Abdominal brace fitting, suggestions, progression    Workout suggestions-TONAL/core body         Ther Ex:     UT stretch c-spine x10 sec r/l  PROM c-spine rotation x10 ea Ther Ex:   NuStep 5min L5     Shuttle- DLP  25# with pelvic brace + iso hip adduction x30 reps     Lifting 10# 12 in step<>waist 2x10    Precor-  abdominal bracing +side step x10 15#    Sit on ball-  Kegel +  U/LE lift  X15    Supine-  Abdominal crunch 2x10    Supine-  LE kick 3x10   Ther Ex:   abdominal bracing +  Clamshell RTB x20 R/L    Standing, sitting, supine kegel's tonic and phasic    Reviewed HEP       Ther Ex:     Pelvic drop-  Various strategies/instructions  Hip adductor stretch x1'  Happy baby x1'  diaphragmatic breathing      Ther Ex:     Pelvic drop-  Sit on ball    Various strategies/instructions  Standing-  Kegel + iso hip add + squat w ball behind back x20     Hip adductor stretch x1'  Happy baby x1' dynamic  diaphragmatic breathing  Ther Ex:   Progress note   HEP-progression     Manual therapy:   STM L upper trap, levator scap,   SO release x3 min  C-spine distraction x3 min x  Manual therapy:   Internal pelvic floor muscle releases R>>L with verbal cueing for pelvic floor muscles lengthening, functional manual release , hip adductor stretch Manual therapy:   - Manual therapy:   Internal pelvic floor muscle releases R>>L with verbal cueing for pelvic floor muscles lengthening, functional manual release , hip adductor stretch           HEP: Kegel 10 repetitions 3x/day, 10 sec on/10 sec off, 2 sec on 2-4 sec off , diaphragmatic breathing , abdominal bracing  + additions above     Charges: TEx1 15', NMRx1 15', MT 10' Total Timed Treatment: 40 min  Total Treatment Time: 40 min

## 2023-10-04 ENCOUNTER — PATIENT MESSAGE (OUTPATIENT)
Dept: INTERNAL MEDICINE CLINIC | Facility: CLINIC | Age: 33
End: 2023-10-04

## 2023-10-04 DIAGNOSIS — H93.90 EAR PROBLEM, UNSPECIFIED LATERALITY: Primary | ICD-10-CM

## 2023-10-05 NOTE — TELEPHONE ENCOUNTER
XIOMARA PUENTES     RN checked with MYNOR who has openings this afternoon. OK to offer appt. Spoke to pt, she is not able to come in today, she works until 9:00 PM tonight. RN informed pt that I cannot see a record of what was prescribed in June of 2022 for previous occurrence. Pt stated she thinks the Rx came from a different IC provider, she may have had oral Rx at the same time. Pt will check with her pharmacy and if she can get the names of the medications, she will send us another [facility]. Informed pt that LS is not in the office today, but a message will be sent to her. Pt v/u and thanked us for the call.

## 2023-10-06 ENCOUNTER — HOSPITAL ENCOUNTER (OUTPATIENT)
Age: 33
Discharge: HOME | End: 2023-10-06
Attending: EMERGENCY MEDICINE

## 2023-10-06 VITALS
WEIGHT: 138 LBS | HEIGHT: 63 IN | OXYGEN SATURATION: 99 % | TEMPERATURE: 98 F | RESPIRATION RATE: 18 BRPM | DIASTOLIC BLOOD PRESSURE: 55 MMHG | BODY MASS INDEX: 24.45 KG/M2 | HEART RATE: 73 BPM | SYSTOLIC BLOOD PRESSURE: 115 MMHG

## 2023-10-06 DIAGNOSIS — H60.02 ABSCESS OF LEFT EAR CANAL: Primary | ICD-10-CM

## 2023-10-06 PROCEDURE — 99213 OFFICE O/P EST LOW 20 MIN: CPT

## 2023-10-06 PROCEDURE — 99214 OFFICE O/P EST MOD 30 MIN: CPT

## 2023-10-06 RX ORDER — AMOXICILLIN AND CLAVULANATE POTASSIUM 875; 125 MG/1; MG/1
1 TABLET, FILM COATED ORAL 2 TIMES DAILY
Qty: 20 TABLET | Refills: 0 | Status: SHIPPED | OUTPATIENT
Start: 2023-10-06 | End: 2023-10-16

## 2023-10-06 NOTE — ED INITIAL ASSESSMENT (HPI)
Pt presents with reddened painful bump to L inner ear, pt has had issue 1x prior with improvement with antibiotic

## 2023-10-06 NOTE — DISCHARGE INSTRUCTIONS
Apply some warm compresses 3-4 times a day Tylenol or Advil for pain take antibiotics as prescribed follow-up with ENT physician early next week or return for worsening symptoms.

## 2023-10-06 NOTE — TELEPHONE ENCOUNTER
Ok to add in 15 min or resp appt time next week but I am not in until Wednesday.  Recommend pt call today to schedule with ENT, or be scheduled with one of my colleagues

## 2023-10-09 ENCOUNTER — HOSPITAL ENCOUNTER (OUTPATIENT)
Dept: MAMMOGRAPHY | Age: 33
Discharge: HOME | End: 2023-10-09
Attending: OBSTETRICS & GYNECOLOGY
Payer: COMMERCIAL

## 2023-10-09 DIAGNOSIS — Z12.31 ENCOUNTER FOR SCREENING MAMMOGRAM FOR MALIGNANT NEOPLASM OF BREAST: ICD-10-CM

## 2023-10-09 PROCEDURE — 77067 SCR MAMMO BI INCL CAD: CPT | Performed by: OBSTETRICS & GYNECOLOGY

## 2023-10-09 PROCEDURE — 77063 BREAST TOMOSYNTHESIS BI: CPT | Performed by: OBSTETRICS & GYNECOLOGY

## 2023-10-12 ENCOUNTER — OFFICE VISIT (OUTPATIENT)
Dept: NEUROLOGY | Facility: CLINIC | Age: 33
End: 2023-10-12
Payer: COMMERCIAL

## 2023-10-12 VITALS
BODY MASS INDEX: 24 KG/M2 | HEART RATE: 70 BPM | SYSTOLIC BLOOD PRESSURE: 98 MMHG | RESPIRATION RATE: 16 BRPM | DIASTOLIC BLOOD PRESSURE: 64 MMHG | WEIGHT: 138 LBS

## 2023-10-12 DIAGNOSIS — R48.2 APRAXIA OF EYELID: ICD-10-CM

## 2023-10-12 DIAGNOSIS — G43.809 VESTIBULAR MIGRAINE: ICD-10-CM

## 2023-10-12 DIAGNOSIS — R42 DIZZINESS: ICD-10-CM

## 2023-10-12 PROCEDURE — 99214 OFFICE O/P EST MOD 30 MIN: CPT | Performed by: OTHER

## 2023-10-12 PROCEDURE — 3078F DIAST BP <80 MM HG: CPT | Performed by: OTHER

## 2023-10-12 PROCEDURE — 3074F SYST BP LT 130 MM HG: CPT | Performed by: OTHER

## 2023-10-12 RX ORDER — VENLAFAXINE HYDROCHLORIDE 37.5 MG/1
37.5 CAPSULE, EXTENDED RELEASE ORAL DAILY
Qty: 30 CAPSULE | Refills: 5 | Status: SHIPPED | OUTPATIENT
Start: 2023-10-12

## 2023-10-12 NOTE — PROGRESS NOTES
Patient reports dizziness (positional) when turns head, walking, bending over. Also states eyebrow felt tense- felt like it was raised but it was actually down and lasted for a few days. Fatigue. Ptosis both eyes returns - sees ophthalmologist. All of this started about 5 months ago. Rubber band feeling across forehead.

## 2023-10-12 NOTE — PROGRESS NOTES
HPI:    Patient ID: Juliet Baugh is a 26year old female. PCP: Dr Ruth Arellano    Patient is 26year old female who presents with multiple neurological symptoms. She is 6 months post partum. States dizziness has returned. States it just feels like dizziness and no clear vertigo symptoms. She has been getting more headaches, band like going across the forehead. Has difficulty opening eyelid both eyes now. Had an episode of right eyebrow felt like it got stuck. No involuntary eye closure or eyelid spasm. Last office visit  She was last seen virtually in Nov 2021. She reports in Jan had Covid and had headache and dizziness while headache has improved dizziness got worse. C/o positional dizziness more with standing and swaying feeling. No vertigo except with head turning can get a spinning sensation. Has chronic dizziness and MRI IAC in the past was negative. Had repeat MRI brain twice for nonspecific white matter changes and showed no new findings. She was evaluated by ENT at [facility] and was leaning towards vestibular migraine. She gets migraine but 1-2 times a year and typically dizziness is not associated with headaches and dizziness is on and off for few months now. No tinnitus presently but had it in the past. No hearing impairment. Was following an elimination diet but sees no correlation with any food items  Saw Neuro-ophthalmology and Dr Rhett Naranjo diagnosed her with Apraxia of eyelid- idiopathic condition where patient has difficulty opening eyelid after waking up from sleep. Myasthenia panel was negative      MRI brain w and wo contrast: June 2021      1. No acute intracranial abnormality identified. 2. A few stable punctate nonspecific foci of T2/FLAIR hyperintensity are noted in the cerebral white matter.  Possible etiologies for these findings would include migraine headaches, postinfectious/postinflammatory changes, nonspecific gliosis, and   accelerated microvascular ischemic disease amongst various other etiologies. Clinical correlation is recommended.            HISTORY:  Past Medical History:   Diagnosis Date    Anemia     Breast injury     June 2022 - hit on left breast with a tenins ball which caused bruising    Carpal tunnel syndrome 05/2017    Right Wrist     Neutropenia, cyclic ([address]     Nov 4575    Pap smear abnormality of cervix     hx of abnormal cells    Vertigo 04/08/2018    follows with ENT      Past Surgical History:   Procedure Laterality Date    ADENOIDECTOMY      CREATE EARDRUM Malik Boyer  as a child, unsure    put in and removed    TONSILLECTOMY      ear tubes as child    UPPER GI ENDOSCOPY,EXAM  approx 2015    peptic ulcers      Family History   Problem Relation Age of Onset    Breast Cancer Mother     Hypertension Mother     Cancer Mother 59        breast cancer    Clotting Disorder Mother     Other (hypothyroidism) Mother     Diabetes Father     Hypertension Father         A-Fib as well    Ear Problems Father     Heart Disorder Father     Heart Disorder Maternal Grandmother     Breast Cancer Paternal Grandmother     Diabetes Paternal Grandmother     Heart Disorder Paternal Grandmother     Cancer Paternal Grandmother         breast/ colon    Obesity Paternal Grandmother     Diabetes Paternal Grandfather     Heart Disorder Paternal Grandfather     Breast Cancer Paternal Aunt     Cancer Paternal Aunt         breast    Diabetes Paternal Aunt     Diabetes Paternal Uncle     Heart Disorder Paternal Uncle       Social History     Socioeconomic History    Marital status:    Tobacco Use    Smoking status: Never    Smokeless tobacco: Never   Vaping Use    Vaping Use: Never used   Substance and Sexual Activity    Alcohol use: Yes     Types: 2 Glasses of wine per week     Comment: socially    Drug use: No    Sexual activity: Yes     Partners: Male     Comment: off OCP since 7/28/19   Other Topics Concern    Caffeine Concern No    Exercise Yes     Comment: daily    Seat Belt Yes Special Diet Yes     Comment: gluten free    Stress Concern No    Weight Concern No   Social Determinants of Health  Financial Resource Strain: Low Risk  (4/2/2023)      Financial Resource Strain          Difficulty of Paying Living Expenses: Not hard at all          Med Affordability: No  Food Insecurity: No Food Insecurity (4/2/2023)      Food Insecurity          Food Insecurity: Never true  Transportation Needs: No Transportation Needs (4/2/2023)      Transportation Needs          Lack of Transportation: No  Stress: No Stress Concern Present (4/2/2023)      Stress          Feeling of Stress : No  Housing Stability: Low Risk  (4/2/2023)      Housing Stability          Housing Instability: No         Review of Systems   Constitutional: Negative. HENT: Negative. Negative for tinnitus. Eyes: Negative. Respiratory: Negative. Cardiovascular: Negative. Gastrointestinal: Negative. Endocrine: Negative. Musculoskeletal: Negative. Neurological:  Negative for dizziness, tremors, speech difficulty, weakness, numbness and headaches. Psychiatric/Behavioral: Negative. All other systems reviewed and are negative. Current Outpatient Medications   Medication Sig Dispense Refill    amoxicillin clavulanate 875-125 MG Oral Tab Take 1 tablet by mouth 2 (two) times daily for 10 days. 20 tablet 0    Misc. Devices (BREAST PUMP) Does not apply Misc Breast pump for breast feeding 1 each 0    Misc. Devices (BREAST PUMP) Does not apply Misc Breast pump for breast-feeding 1 each 0    Prenatal Vit-Fe Fumarate-FA (MULTI PRENATAL) 27-0.8 MG Oral Tab Take 1 tablet by mouth. Allergies:  Triprolidine-Pse        OTHER (SEE COMMENTS)    Comment:Tachycardia, as a child             palpatations             palpatations  Ed A-Hist Pse           OTHER (SEE COMMENTS)    Comment:Tachycardia, as a child             palpatations   PHYSICAL EXAM:   Physical Exam    Blood pressure 98/64, pulse 70, resp.  rate 16, weight 138 lb (62.6 kg), currently breastfeeding. General: well developed, well nourished  HEENT: Normocephalic and atraumatic. Cardiovascular: Normal rate, regular rhythm and normal heart sounds. Pulmonary/Chest: Effort normal and breath sounds normal.   Abdominal: Soft. Bowel sounds are normal.   Skin: Skin is warm and dry. Psychiatric:  normal mood and affect. Neurological   Awake, alert and oriented to time, place and person. Speech is fluent with intact comprehension, repetition and naming. Normal attention and memory. Higher cortical function intact. Cranial nerves 2-12: grossly intact  Sensory : Intact to light touch symmetrically  Motor: Normal tone in all extremities. Strength is 5/5 in all muscle groups  Reflexes: symmetric and present  Coordination: Intact   Gait: Normal casual gait           ASSESSMENT/PLAN:     (G43.809) Vestibular migraine    (R42) Dizziness      (R48.2) Apraxia of eyelid           1. Probable vestibular migraine vs vestibular dizziness  She was Off Venlafaxine due to pregnancy  Dizziness has returned, advised to resume venlafaxine extended release 37.5 mg  Advised to discuss with the lactation consultant as the patient still breast-feeding  Hydrate well and monitor symptoms    2. Apraxia of eyelid  Neuro-ophthalmology evaluation noted  No further work up needed    3. White matter lesions, nonspecific at this time. There are 2 small periventricular lesions, prominent in the right periventricular deep white matter likely incidental  MRI cervical and thoracic spine with and without contrast negative . Repeat MRI brain shows no interval new lesions    Follow up in about 6 months  See orders and medications filed with this encounter. The patient indicates understanding of these issues and agrees with the plan. Hinda Leyden, MD  [facility]        No orders of the defined types were placed in this encounter.       Meds This Visit:  Requested Prescriptions      No prescriptions requested or ordered in this encounter       Imaging & Referrals:  None       ID#[id number]    Migraine   Pertinent negatives include no dizziness, numbness, tinnitus or weakness.

## 2023-10-16 ENCOUNTER — PATIENT MESSAGE (OUTPATIENT)
Dept: OBGYN CLINIC | Facility: CLINIC | Age: 33
End: 2023-10-16

## 2023-10-20 NOTE — TELEPHONE ENCOUNTER
From: Sarah Bolivar  To: Felipe Ambrosio  Sent: 10/16/2023 1:52 PM CDT  Subject: Mammogram F/u    Hello,  I see my mammogram results. I wondered if there's any other testing I need to do or if I should wait until next year to have repeat mammogram after I've finished breastfeeding. I believe I follow up for my annual in May 2024.   Thanks,  [employer]

## 2023-10-23 ENCOUNTER — TELEPHONE (OUTPATIENT)
Dept: OBGYN CLINIC | Facility: CLINIC | Age: 33
End: 2023-10-23

## 2023-10-23 PROBLEM — R92.30 DENSE BREASTS: Status: ACTIVE | Noted: 2023-10-23

## 2023-10-23 PROBLEM — Z80.3 FAMILY HISTORY OF BREAST CANCER: Status: ACTIVE | Noted: 2023-10-23

## 2023-10-23 PROBLEM — R92.2 DENSE BREASTS: Status: ACTIVE | Noted: 2023-10-23

## 2023-10-24 PROBLEM — O98.519 COVID-19 AFFECTING PREGNANCY, ANTEPARTUM: Status: RESOLVED | Noted: 2022-09-26 | Resolved: 2023-10-24

## 2023-10-24 PROBLEM — U07.1 COVID-19 AFFECTING PREGNANCY, ANTEPARTUM (HCC): Status: RESOLVED | Noted: 2022-09-26 | Resolved: 2023-10-24

## 2023-10-24 PROBLEM — U07.1 COVID-19 AFFECTING PREGNANCY, ANTEPARTUM: Status: RESOLVED | Noted: 2022-09-26 | Resolved: 2023-10-24

## 2023-10-24 PROBLEM — O98.519 COVID-19 AFFECTING PREGNANCY, ANTEPARTUM (HCC): Status: RESOLVED | Noted: 2022-09-26 | Resolved: 2023-10-24

## 2023-10-25 DIAGNOSIS — Z12.39 BREAST CANCER SCREENING OTHER THAN MAMMOGRAM: Primary | ICD-10-CM

## 2023-10-25 DIAGNOSIS — R92.2 DENSE BREASTS: ICD-10-CM

## 2023-10-25 DIAGNOSIS — Z80.3 FAMILY HISTORY OF BREAST CANCER: ICD-10-CM

## 2023-10-25 DIAGNOSIS — R92.30 DENSE BREASTS: ICD-10-CM

## 2023-10-25 NOTE — TELEPHONE ENCOUNTER
TC to patient yesterday. Positive family history of breast cancer in mother and multiple second degree relatives. Patient has had negative BRCA testing. Discussed pros and cons of testing. WBUS screen ordered.   Recommend yearly mammogram and ultrasound age 34

## 2023-11-08 ENCOUNTER — HOSPITAL ENCOUNTER (OUTPATIENT)
Age: 33
Discharge: HOME | End: 2023-11-08
Attending: EMERGENCY MEDICINE
Payer: COMMERCIAL

## 2023-11-08 ENCOUNTER — APPOINTMENT (OUTPATIENT)
Dept: GENERAL RADIOLOGY | Age: 33
End: 2023-11-08
Attending: EMERGENCY MEDICINE
Payer: COMMERCIAL

## 2023-11-08 VITALS
WEIGHT: 138 LBS | BODY MASS INDEX: 24.45 KG/M2 | DIASTOLIC BLOOD PRESSURE: 56 MMHG | TEMPERATURE: 98 F | RESPIRATION RATE: 23 BRPM | HEIGHT: 63 IN | HEART RATE: 77 BPM | SYSTOLIC BLOOD PRESSURE: 114 MMHG | OXYGEN SATURATION: 99 %

## 2023-11-08 DIAGNOSIS — J20.9 ACUTE BRONCHITIS, UNSPECIFIED ORGANISM: Primary | ICD-10-CM

## 2023-11-08 LAB
#MXD IC: 0.6 X10ˆ3/UL (ref 0.1–1)
BASOPHILS # BLD AUTO: 0.04 X10(3) UL (ref 0–0.2)
BASOPHILS NFR BLD AUTO: 1 %
BUN BLD-MCNC: 7 MG/DL (ref 7–18)
CHLORIDE BLD-SCNC: 104 MMOL/L (ref 98–112)
CO2 BLD-SCNC: 25 MMOL/L (ref 21–32)
CREAT BLD-MCNC: 0.6 MG/DL
DDIMER WHOLE BLOOD: <200 NG/ML DDU (ref ?–400)
EGFRCR SERPLBLD CKD-EPI 2021: 120 ML/MIN/1.73M2 (ref 60–?)
EOSINOPHIL # BLD AUTO: 0.15 X10(3) UL (ref 0–0.7)
EOSINOPHIL NFR BLD AUTO: 3.9 %
ERYTHROCYTE [DISTWIDTH] IN BLOOD BY AUTOMATED COUNT: 11.9 %
GLUCOSE BLD-MCNC: 86 MG/DL (ref 70–99)
HCT VFR BLD AUTO: 37.4 %
HCT VFR BLD AUTO: 39.6 %
HCT VFR BLD CALC: 37 %
HGB BLD-MCNC: 12.9 G/DL
HGB BLD-MCNC: 13.1 G/DL
IMM GRANULOCYTES # BLD AUTO: 0.01 X10(3) UL (ref 0–1)
IMM GRANULOCYTES NFR BLD: 0.3 %
ISTAT IONIZED CALCIUM FOR CHEM 8: 1.18 MMOL/L (ref 1.12–1.32)
LYMPHOCYTES # BLD AUTO: 1.32 X10(3) UL (ref 1–4)
LYMPHOCYTES # BLD AUTO: 1.5 X10ˆ3/UL (ref 1–4)
LYMPHOCYTES NFR BLD AUTO: 34.5 %
LYMPHOCYTES NFR BLD AUTO: 38 %
MCH RBC QN AUTO: 30.2 PG (ref 26–34)
MCH RBC QN AUTO: 31.5 PG (ref 26–34)
MCHC RBC AUTO-ENTMCNC: 33.1 G/DL (ref 31–37)
MCHC RBC AUTO-ENTMCNC: 34.5 G/DL (ref 31–37)
MCV RBC AUTO: 91.2 FL
MCV RBC AUTO: 91.2 FL (ref 80–100)
MIXED CELL %: 15.7 %
MONOCYTES # BLD AUTO: 0.47 X10(3) UL (ref 0.1–1)
MONOCYTES NFR BLD AUTO: 12.3 %
NEUTROPHILS # BLD AUTO: 1.8 X10ˆ3/UL (ref 1.5–7.7)
NEUTROPHILS # BLD AUTO: 1.84 X10 (3) UL (ref 1.5–7.7)
NEUTROPHILS # BLD AUTO: 1.84 X10(3) UL (ref 1.5–7.7)
NEUTROPHILS NFR BLD AUTO: 46.3 %
NEUTROPHILS NFR BLD AUTO: 48 %
PLATELET # BLD AUTO: 214 10(3)UL (ref 150–450)
POCT INFLUENZA A: NEGATIVE
POCT INFLUENZA B: NEGATIVE
POTASSIUM BLD-SCNC: 3.9 MMOL/L (ref 3.6–5.1)
RBC # BLD AUTO: 4.1 X10(6)UL
RBC # BLD AUTO: 4.34 X10ˆ6/UL
SARS-COV-2 RNA RESP QL NAA+PROBE: NOT DETECTED
SODIUM BLD-SCNC: 142 MMOL/L (ref 136–145)
TROPONIN I BLD-MCNC: <0.02 NG/ML
WBC # BLD AUTO: 3.8 X10(3) UL (ref 4–11)
WBC # BLD AUTO: 3.9 X10ˆ3/UL (ref 4–11)

## 2023-11-08 PROCEDURE — 85025 COMPLETE CBC W/AUTO DIFF WBC: CPT | Performed by: EMERGENCY MEDICINE

## 2023-11-08 PROCEDURE — 85378 FIBRIN DEGRADE SEMIQUANT: CPT | Performed by: EMERGENCY MEDICINE

## 2023-11-08 PROCEDURE — 71046 X-RAY EXAM CHEST 2 VIEWS: CPT | Performed by: EMERGENCY MEDICINE

## 2023-11-08 PROCEDURE — 80047 BASIC METABLC PNL IONIZED CA: CPT

## 2023-11-08 PROCEDURE — 87502 INFLUENZA DNA AMP PROBE: CPT | Performed by: EMERGENCY MEDICINE

## 2023-11-08 PROCEDURE — 93005 ELECTROCARDIOGRAM TRACING: CPT

## 2023-11-08 PROCEDURE — 84484 ASSAY OF TROPONIN QUANT: CPT

## 2023-11-08 RX ORDER — ALBUTEROL SULFATE 90 UG/1
2 AEROSOL, METERED RESPIRATORY (INHALATION) EVERY 4 HOURS PRN
Qty: 1 EACH | Refills: 0 | Status: SHIPPED | OUTPATIENT
Start: 2023-11-08 | End: 2023-12-08

## 2023-11-08 RX ORDER — SODIUM CHLORIDE 9 MG/ML
1000 INJECTION, SOLUTION INTRAVENOUS ONCE
Status: COMPLETED | OUTPATIENT
Start: 2023-11-08 | End: 2023-11-08

## 2023-11-08 RX ORDER — METHYLPREDNISOLONE 4 MG/1
TABLET ORAL
Qty: 1 EACH | Refills: 0 | Status: SHIPPED | OUTPATIENT
Start: 2023-11-08

## 2023-11-08 NOTE — ED INITIAL ASSESSMENT (HPI)
C/o cough for 3 weeks. Laryngitis few days ago. Cough productive. Last night with deep cough felt like passing out and heavy heart beats. Constant chest pain, pressure and heaviness since last night.

## 2023-11-08 NOTE — DISCHARGE INSTRUCTIONS
Please follow-up with your primary care doctor within the next week for reassessment. Seek emergent medical care for any difficulty breathing or other concerning symptoms.

## 2023-11-09 LAB
ATRIAL RATE: 74 BPM
ATRIAL RATE: 89 BPM
P AXIS: 65 DEGREES
P AXIS: 73 DEGREES
P-R INTERVAL: 144 MS
P-R INTERVAL: 146 MS
Q-T INTERVAL: 382 MS
Q-T INTERVAL: 388 MS
QRS DURATION: 76 MS
QRS DURATION: 78 MS
QTC CALCULATION (BEZET): 430 MS
QTC CALCULATION (BEZET): 464 MS
R AXIS: 53 DEGREES
R AXIS: 64 DEGREES
T AXIS: 65 DEGREES
T AXIS: 69 DEGREES
VENTRICULAR RATE: 74 BPM
VENTRICULAR RATE: 89 BPM

## 2023-11-20 ENCOUNTER — OFFICE VISIT (OUTPATIENT)
Dept: INTERNAL MEDICINE CLINIC | Facility: CLINIC | Age: 33
End: 2023-11-20
Payer: COMMERCIAL

## 2023-11-20 VITALS
SYSTOLIC BLOOD PRESSURE: 96 MMHG | DIASTOLIC BLOOD PRESSURE: 68 MMHG | HEIGHT: 62.5 IN | BODY MASS INDEX: 24.73 KG/M2 | HEART RATE: 65 BPM | OXYGEN SATURATION: 100 % | TEMPERATURE: 98 F | WEIGHT: 137.81 LBS

## 2023-11-20 DIAGNOSIS — R00.2 PALPITATIONS: ICD-10-CM

## 2023-11-20 DIAGNOSIS — Z00.00 ROUTINE GENERAL MEDICAL EXAMINATION AT A HEALTH CARE FACILITY: Primary | ICD-10-CM

## 2023-11-20 DIAGNOSIS — R79.89 HIGH SERUM VITAMIN D: ICD-10-CM

## 2023-11-20 PROBLEM — R92.30 DENSE BREASTS: Status: RESOLVED | Noted: 2023-10-23 | Resolved: 2023-11-20

## 2023-11-20 PROBLEM — R92.2 DENSE BREASTS: Status: RESOLVED | Noted: 2023-10-23 | Resolved: 2023-11-20

## 2023-11-20 RX ORDER — FLUTICASONE PROPIONATE 50 MCG
2 SPRAY, SUSPENSION (ML) NASAL DAILY
Qty: 16 G | Refills: 1 | Status: SHIPPED | OUTPATIENT
Start: 2023-11-20 | End: 2024-11-14

## 2023-11-27 ENCOUNTER — HOSPITAL ENCOUNTER (OUTPATIENT)
Dept: CV DIAGNOSTICS | Facility: HOSPITAL | Age: 33
Discharge: HOME | End: 2023-11-27
Attending: INTERNAL MEDICINE
Payer: COMMERCIAL

## 2023-11-27 DIAGNOSIS — R00.2 PALPITATIONS: ICD-10-CM

## 2023-11-27 PROCEDURE — 93243 EXT ECG>48HR<7D SCAN A/R: CPT | Performed by: INTERNAL MEDICINE

## 2023-11-27 PROCEDURE — 93242 EXT ECG>48HR<7D RECORDING: CPT | Performed by: INTERNAL MEDICINE

## 2023-12-18 ENCOUNTER — HOSPITAL ENCOUNTER (OUTPATIENT)
Dept: ULTRASOUND IMAGING | Age: 33
Discharge: HOME | End: 2023-12-18
Attending: OBSTETRICS & GYNECOLOGY
Payer: COMMERCIAL

## 2023-12-18 DIAGNOSIS — R92.30 DENSE BREASTS: ICD-10-CM

## 2023-12-18 DIAGNOSIS — Z80.3 FAMILY HISTORY OF BREAST CANCER: ICD-10-CM

## 2023-12-18 DIAGNOSIS — Z12.39 BREAST CANCER SCREENING OTHER THAN MAMMOGRAM: ICD-10-CM

## 2023-12-18 PROCEDURE — 76641 ULTRASOUND BREAST COMPLETE: CPT | Performed by: OBSTETRICS & GYNECOLOGY

## 2024-02-04 ENCOUNTER — HOSPITAL ENCOUNTER (OUTPATIENT)
Age: 34
Discharge: HOME | End: 2024-02-04
Payer: COMMERCIAL

## 2024-02-04 VITALS
RESPIRATION RATE: 18 BRPM | BODY MASS INDEX: 24.45 KG/M2 | HEART RATE: 75 BPM | DIASTOLIC BLOOD PRESSURE: 56 MMHG | TEMPERATURE: 98 F | SYSTOLIC BLOOD PRESSURE: 101 MMHG | HEIGHT: 63 IN | WEIGHT: 138 LBS | OXYGEN SATURATION: 98 %

## 2024-02-04 DIAGNOSIS — H65.01 NON-RECURRENT ACUTE SEROUS OTITIS MEDIA OF RIGHT EAR: Primary | ICD-10-CM

## 2024-02-04 PROCEDURE — 99213 OFFICE O/P EST LOW 20 MIN: CPT

## 2024-02-04 RX ORDER — AMOXICILLIN AND CLAVULANATE POTASSIUM 875; 125 MG/1; MG/1
1 TABLET, FILM COATED ORAL 2 TIMES DAILY
Qty: 20 TABLET | Refills: 0 | Status: SHIPPED | OUTPATIENT
Start: 2024-02-04 | End: 2024-02-14

## 2024-02-04 NOTE — DISCHARGE INSTRUCTIONS
Take antibiotics as prescribed  Go directly to emergency department for severe ear pain, sudden inability to hearing, swelling to your face behind ears or in your neck  You may also take ibuprofen or Tylenol for pain  Avoid swimming or submerging your head in water until you finish antibiotics

## 2024-02-04 NOTE — ED PROVIDER NOTES
Patient Seen in: [facility]      History     Chief Complaint   Patient presents with    Ear Problem Pain     Stated Complaint: ear pressure, pain, hearing issues, sore throat    Subjective:   33-year-old female presents to the immediate care for right-sided ear pain.  Patient reports pain with swallowing along with throbbing in her ear did take Tylenol last night without any relief.  Denies any shortness of breath denies chest pain            Objective:   Past Medical History:   Diagnosis Date    Anemia     Breast injury     June 2022 - hit on left breast with a tenins ball which caused bruising    Carpal tunnel syndrome 05/2017    Right Wrist     Neutropenia, cyclic ([facility])     Nov 2018    Pap smear abnormality of cervix     hx of abnormal cells    Vertigo 04/08/2018    follows with ENT              Past Surgical History:   Procedure Laterality Date    ADENOIDECTOMY      CREATE EARDRUM OPENING,GEN ANESTH  as a child, unsure    put in and removed    TONSILLECTOMY      ear tubes as child    UPPER GI ENDOSCOPY,EXAM  approx 2015    peptic ulcers                Social History     Socioeconomic History    Marital status:    Tobacco Use    Smoking status: Never    Smokeless tobacco: Never   Vaping Use    Vaping Use: Never used   Substance and Sexual Activity    Alcohol use: Yes     Types: 2 Glasses of wine per week     Comment: 3 drinks/week    Drug use: No    Sexual activity: Yes     Partners: Male     Comment: off OCP since 7/28/19   Other Topics Concern    Caffeine Concern No    Exercise Yes     Comment: daily    Seat Belt Yes    Special Diet Yes     Comment: gluten free    Stress Concern No    Weight Concern No     Social Determinants of Health     Financial Resource Strain: Low Risk  (4/2/2023)    Financial Resource Strain     Difficulty of Paying Living Expenses: Not hard at all     Med Affordability: No   Food Insecurity: No Food Insecurity (4/2/2023)    Food Insecurity     Food Insecurity:  Never true   Transportation Needs: No Transportation Needs (4/2/2023)    Transportation Needs     Lack of Transportation: No   Stress: No Stress Concern Present (4/2/2023)    Stress     Feeling of Stress : No   Housing Stability: Low Risk  (4/2/2023)    Housing Stability     Housing Instability: No              Review of Systems   Constitutional: Negative.    HENT:  Positive for congestion and ear pain.    Respiratory: Negative.     Cardiovascular: Negative.    Gastrointestinal: Negative.    Skin: Negative.    Neurological: Negative.        Positive for stated complaint: ear pressure, pain, hearing issues, sore throat  Other systems are as noted in HPI.  Constitutional and vital signs reviewed.      All other systems reviewed and negative except as noted above.    Physical Exam     ED Triage Vitals [02/04/24 0839]   /56   Pulse 75   Resp 18   Temp 98.2 °F (36.8 °C)   Temp src Temporal   SpO2 98 %   O2 Device None (Room air)       Current:/56   Pulse 75   Temp 98.2 °F (36.8 °C) (Temporal)   Resp 18   Ht 160 cm (5' 3\")   Wt 62.6 kg   LMP 07/22/2023 (Approximate)   SpO2 98%   BMI 24.45 kg/m²         Physical Exam  Vitals and nursing note reviewed.   Constitutional:       General: She is not in acute distress.  HENT:      Head: Normocephalic.      Right Ear: A middle ear effusion is present. Tympanic membrane is scarred, erythematous and bulging.      Left Ear: A middle ear effusion is present. Tympanic membrane is scarred and bulging.   Cardiovascular:      Rate and Rhythm: Normal rate.   Pulmonary:      Effort: Pulmonary effort is normal.   Musculoskeletal:         General: Normal range of motion.   Skin:     General: Skin is warm and dry.   Neurological:      General: No focal deficit present.      Mental Status: She is alert and oriented to person, place, and time.               ED Course   Labs Reviewed - No data to display                   MDM      Medical Decision Making  Pertinent Labs &  Imaging studies reviewed. (See chart for details).  Patient coming in with ear pain, congestion.   Differential diagnosis includes otitis media, viral URI.  Will treat for otitis media.  Will discharge on Augmentin. Patient is comfortable with this plan.     Overall Pt looks good. Non-toxic, well-hydrated and in no respiratory distress. Vital signs are reassuring. Exam is reassuring. I do not believe pt requires and additional diagnostic studies or intervention. I believe pt can be discharged home to continue evaluation as an outpatient. Follow-up provider given. Discharge instructions given and reviewed. Return for any problems. All understand and agreewith the plan.     Please note that this report has been produced using speech recognition software and may contain errors related to that system including, but not limited to, errors in grammar, punctuation, and spelling, as well as words and phrases that possibly may have been recognized inappropriately. If there are any questions or concerns, contact the dictating provider for clarification.       Problems Addressed:  Non-recurrent acute serous otitis media of right ear: acute illness or injury    Risk  OTC drugs.  Prescription drug management.        Disposition and Plan     Clinical Impression:  1. Non-recurrent acute serous otitis media of right ear         Disposition:  Discharge  2/4/2024  9:11 am    Follow-up:  Veena Richter MD  [address] [place] [id number] [phone]                Medications Prescribed:  Discharge Medication List as of 2/4/2024  9:12 AM        START taking these medications    Details   amoxicillin clavulanate 875-125 MG Oral Tab Take 1 tablet by mouth 2 (two) times daily for 10 days., Normal, Disp-20 tablet, R-0

## 2024-04-19 ENCOUNTER — LAB ENCOUNTER (OUTPATIENT)
Dept: LAB | Age: 34
End: 2024-04-19
Attending: INTERNAL MEDICINE
Payer: COMMERCIAL

## 2024-04-19 DIAGNOSIS — Z00.00 ROUTINE GENERAL MEDICAL EXAMINATION AT A HEALTH CARE FACILITY: ICD-10-CM

## 2024-04-19 LAB
ALT SERPL-CCNC: <7 U/L
ANION GAP SERPL CALC-SCNC: 7 MMOL/L (ref 0–18)
AST SERPL-CCNC: 15 U/L (ref ?–34)
BUN BLD-MCNC: 11 MG/DL (ref 9–23)
BUN/CREAT SERPL: 14.9 (ref 10–20)
CALCIUM BLD-MCNC: 9.5 MG/DL (ref 8.7–10.4)
CHLORIDE SERPL-SCNC: 109 MMOL/L (ref 98–112)
CHOLEST SERPL-MCNC: 162 MG/DL (ref ?–200)
CO2 SERPL-SCNC: 27 MMOL/L (ref 21–32)
CREAT BLD-MCNC: 0.74 MG/DL
EGFRCR SERPLBLD CKD-EPI 2021: 108 ML/MIN/1.73M2 (ref 60–?)
FASTING PATIENT LIPID ANSWER: YES
FASTING STATUS PATIENT QL REPORTED: YES
GLUCOSE BLD-MCNC: 93 MG/DL (ref 70–99)
HCV AB SERPL QL IA: NONREACTIVE
HDLC SERPL-MCNC: 54 MG/DL (ref 40–59)
LDLC SERPL CALC-MCNC: 95 MG/DL (ref ?–100)
NONHDLC SERPL-MCNC: 108 MG/DL (ref ?–130)
OSMOLALITY SERPL CALC.SUM OF ELEC: 295 MOSM/KG (ref 275–295)
POTASSIUM SERPL-SCNC: 4.4 MMOL/L (ref 3.5–5.1)
SODIUM SERPL-SCNC: 143 MMOL/L (ref 136–145)
TRIGL SERPL-MCNC: 66 MG/DL (ref 30–149)
VIT B12 SERPL-MCNC: 692 PG/ML (ref 211–911)
VIT D+METAB SERPL-MCNC: 28.5 NG/ML (ref 30–100)
VLDLC SERPL CALC-MCNC: 11 MG/DL (ref 0–30)

## 2024-04-19 PROCEDURE — 84460 ALANINE AMINO (ALT) (SGPT): CPT | Performed by: INTERNAL MEDICINE

## 2024-04-19 PROCEDURE — 82306 VITAMIN D 25 HYDROXY: CPT | Performed by: INTERNAL MEDICINE

## 2024-04-19 PROCEDURE — 82607 VITAMIN B-12: CPT | Performed by: INTERNAL MEDICINE

## 2024-04-19 PROCEDURE — 86803 HEPATITIS C AB TEST: CPT | Performed by: INTERNAL MEDICINE

## 2024-04-19 PROCEDURE — 84450 TRANSFERASE (AST) (SGOT): CPT | Performed by: INTERNAL MEDICINE

## 2024-04-19 PROCEDURE — 80048 BASIC METABOLIC PNL TOTAL CA: CPT | Performed by: INTERNAL MEDICINE

## 2024-04-19 PROCEDURE — 80061 LIPID PANEL: CPT | Performed by: INTERNAL MEDICINE

## 2024-04-22 ENCOUNTER — OFFICE VISIT (OUTPATIENT)
Dept: OBGYN CLINIC | Facility: CLINIC | Age: 34
End: 2024-04-22
Payer: COMMERCIAL

## 2024-04-22 VITALS
DIASTOLIC BLOOD PRESSURE: 62 MMHG | BODY MASS INDEX: 26 KG/M2 | SYSTOLIC BLOOD PRESSURE: 102 MMHG | WEIGHT: 146.5 LBS | HEART RATE: 69 BPM

## 2024-04-22 DIAGNOSIS — Z01.419 WELL WOMAN EXAM WITH ROUTINE GYNECOLOGICAL EXAM: Primary | ICD-10-CM

## 2024-04-22 NOTE — PROGRESS NOTES
[facility]  Obstetrics and Gynecology  History & Physical    CC: Patient presents for a well woman exam     Subjective:     HPI: Helen Shankar is a 33 year old  female here for a well women exam. Patient reports doing well. No complaints.      Pt's mother had breast CA age 64 dx, multiple 2nd degree relatives with breast cancer  Pt is BRCA negative    OB History:  OB History    Para Term  AB Living   3 3 2 1 0 3   SAB IAB Ectopic Multiple Live Births   0 0 0 0 3      # Outcome Date GA Lbr Oswaldo/2nd Weight Sex Type Anes PTL Lv   3 Term 23 39w1d 00:54 / 00:02 7 lb 8.6 oz (3.42 kg) F NORMAL SPONT Local N CLIFF      Complications: Group B beta strep +   2 Term 20 40w0d 01:53 / 00:02 7 lb 2.6 oz (3.25 kg) M NORMAL SPONT Local N CLIFF      Complications: Precipitous labor   1  17 34w4d 05:45 / 00:17 4 lb 15.9 oz (2.265 kg) F NORMAL SPONT Local Y CLIFF      Complications:  labor       Gyne History:  Hx Prior Abnormal Pap: Yes (not sure when)  Pap Date: 05/15/23  Pap Result Notes: wnl  Patient's last menstrual period was 2024 (approximate).  NILM, HPV neg 2023  denies history of STDs (non-HPV).  Sexual history: Active? yes  Birth control? Vasectomy    Meds:  Current Outpatient Medications on File Prior to Visit   Medication Sig Dispense Refill    venlafaxine ER 37.5 MG Oral Capsule SR 24 Hr Take 1 capsule (37.5 mg total) by mouth daily. 30 capsule 5    Prenatal Vit-Fe Fumarate-FA (MULTI PRENATAL) 27-0.8 MG Oral Tab Take 1 tablet by mouth.       No current facility-administered medications on file prior to visit.       All:  Allergies   Allergen Reactions    Antihistamines, Loratadine-Type PALPITATIONS     Palpitations    Triprolidine-Pse OTHER (SEE COMMENTS)     Tachycardia, as a child    palpatations  palpatations    Ed A-Hist Pse OTHER (SEE COMMENTS)     Tachycardia, as a child    palpatations       PMH:  Past Medical History:    Anemia    Breast injury     June 2022 - hit on left breast with a tenins ball which caused bruising    Carpal tunnel syndrome    Right Wrist     Neutropenia, cyclic (HCC)    Nov 2018    Pap smear abnormality of cervix    hx of abnormal cells    Vertigo    follows with ENT       Immunization History:   Immunization History   Administered Date(s) Administered    Covid-19 Vaccine Moderna 100 mcg/0.5 ml 01/16/2021, 02/13/2021, 11/12/2021    Covid-19 Vaccine Moderna Bivalent 50mcg/0.5mL 12+ years 12/18/2022    DTAP INFANRIX 05/01/2011    FLULAVAL 6 months & older 0.5 ml Prefilled syringe (51044) 10/23/2018, 12/10/2019, 09/21/2020, 10/06/2021    FLUZONE 6 months and older PFS 0.5 ml ([id number]) 10/03/2016, 10/23/2018, 09/21/2020, 10/06/2021, 11/11/2022    Flucelvax 0.5ml Vaccine 10/09/2023    HPV (Gardasil) 08/13/2007, 10/18/2007, 02/20/2008    Influenza 02/17/2015, 02/18/2015, 10/23/2018, 12/11/2019, 11/11/2022    Moderna Covid-19 Vaccine 50mcg/0.5ml 12yrs+ ([id number]) 10/09/2023    TDAP 12/31/2016, 06/19/2020, 01/23/2023       PSH:  Past Surgical History:   Procedure Laterality Date    Adenoidectomy      Create eardrum opening,gen anesth  as a child, unsure    put in and removed    Tonsillectomy      ear tubes as child    Upper gi endoscopy,exam  approx 2015    peptic ulcers       Social History:  Social History     Socioeconomic History    Marital status:      Spouse name: Not on file    Number of children: Not on file    Years of education: Not on file    Highest education level: Not on file   Occupational History    Not on file   Tobacco Use    Smoking status: Never    Smokeless tobacco: Never   Vaping Use    Vaping status: Never Used   Substance and Sexual Activity    Alcohol use: Yes     Types: 2 Glasses of wine per week     Comment: 3 drinks/week    Drug use: Yes     Types: Cannabis    Sexual activity: Yes     Partners: Male     Birth control/protection: Vasectomy   Other Topics Concern    Caffeine Concern No    Exercise Yes     Comment:  daily    Seat Belt Yes    Special Diet Yes     Comment: gluten free    Stress Concern No    Weight Concern No     Service Not Asked    Blood Transfusions Not Asked    Occupational Exposure Not Asked    Hobby Hazards Not Asked    Sleep Concern Not Asked    Back Care Not Asked    Bike Helmet Not Asked    Self-Exams Not Asked   Social History Narrative    Not on file     Social Determinants of Health     Financial Resource Strain: Low Risk  (4/2/2023)    Financial Resource Strain     Difficulty of Paying Living Expenses: Not hard at all     Med Affordability: No   Food Insecurity: No Food Insecurity (4/2/2023)    Food Insecurity     Food Insecurity: Never true   Transportation Needs: No Transportation Needs (4/2/2023)    Transportation Needs     Lack of Transportation: No   Physical Activity: Not on file   Stress: No Stress Concern Present (4/2/2023)    Stress     Feeling of Stress : No   Social Connections: Not on file   Housing Stability: At Risk (12/15/2023)    Received from [facility]     Living Situation: Not on file     Housing Problems: Not on file         Patient feels unsafe or threatened?: denies    Abuse: denies physical, sexual or mental.     Family History:  Family History   Problem Relation Age of Onset    Breast Cancer Mother     Hypertension Mother     Cancer Mother 64        breast cancer    Clotting Disorder Mother     Other (hypothyroidism) Mother     Diabetes Father     Hypertension Father         A-Fib as well    Ear Problems Father     Heart Disorder Father     Heart Disorder Maternal Grandmother     Breast Cancer Paternal Grandmother     Diabetes Paternal Grandmother     Heart Disorder Paternal Grandmother     Cancer Paternal Grandmother         breast/ colon    Obesity Paternal Grandmother     Diabetes Paternal Grandfather     Heart Disorder Paternal Grandfather     Breast Cancer Paternal Aunt     Cancer Paternal Aunt         breast    Diabetes Paternal Aunt     Diabetes  Paternal Uncle     Heart Disorder Paternal Uncle        Health maintenance:  Mammogram (age 40 and q1-2yr): Baseline mammogram 10/2023 with bilateral breast US 2023; repeat in 3 years   Colonoscopy (age 43 and q10yr): n/a    Review of Systems:  General: no complaints per category. See HPI for additional information.   Breast: no complaints per category. See HPI for additional information.   Respiratory: no complaints per category. See HPI for additional information.   Cardiovascular: no complaints per category. See HPI for additional information.   GI: no complaints per category. See HPI for additional information.   : no complaints per category. See HPI for additional information.   Heme: no complaints per category. See HPI for additional information.       Objective:     Vitals:    24 1701   BP: 102/62   Pulse: 69   Weight: 146 lb 8 oz (66.5 kg)         Body mass index is 25.95 kg/m².    General: AAO.NAD.   CVS exam: normal peripheral perfusion  Chest: non-labored breathing, no tachypnea   Breast: symmetric, no dominant or suspicious mass, no skin or nipple changes and no axillary adenopathy  Abdominal exam: soft, nontender, nondistended  Pelvic exam:   VULVA: normal appearing vulva with no masses, tenderness or lesions  PERINEUM: normal appearing, no lesions   URETHRAL MEATUS: normal appearing, no lesions   VAGINA: normal appearing vagina with normal color and discharge, no lesions  CERVIX: normal appearing cervix without discharge or lesions  UTERUS: uterus is normal size, shape, consistency and nontender  ADNEXA: normal adnexa in size, nontender and no masses  PERIRECTAL: normal appearing, no lesions   Ext: non-tender, no edema    Assessment:     Helen Shankar is a 33 year old  female here for a well women exam.       Plan:       Problem List Items Addressed This Visit    None  Visit Diagnoses       Well woman exam with routine gynecological exam    -  Primary            Cervical cancer  screening  - discussion held with the patient about ASCCP guidelines  - repeat pap smear next year  Health maintenance  - encouraged to maintain weight at healthy BMI  - discussed importance of exercise and healthy eating  - self breast exam instructions provided   - bilateral screening mammogram recommendations discussed and order provided for baseline 2023, repeat in 3 years          Problem List Items Addressed This Visit    None  Visit Diagnoses       Well woman exam with routine gynecological exam    -  Primary                  All of the findings and plan were discussed with the patient.  She notes understanding and agrees with the plan of care.  All questions were answered to the best of my ability at this time.      RTC in 1 year for a well woman exam or sooner if needed     Ladonna Caro MD   [facility] - OBGYN      Discussed with patient that there will not be further notification of normal or benign results other than receiving results on Preo. A Preo message or telephone call will be placed by the physician and/or office staff if results are abnormal.       Note to patient and family   The 21st Century Cures Act makes medical notes available to patients in the interest of transparency.  However, please be advised that this is a medical document.  It is intended as [id number] communication.  It is written and medical language may contain abbreviations or verbiage that are technical and unfamiliar.  It may appear blunt or direct.  Medical documents are intended to carry relevant information, facts as evident, and the clinical opinion of the practitioner.      This note could include assistance by Dragon voice recognition. Errors in content may be related to improper recognition by the system; efforts to review and correct have been done but errors may still exist.

## 2024-08-01 ENCOUNTER — MED REC SCAN ONLY (OUTPATIENT)
Facility: CLINIC | Age: 34
End: 2024-08-01

## 2024-08-07 ENCOUNTER — TELEPHONE (OUTPATIENT)
Dept: ORTHOPEDICS CLINIC | Facility: CLINIC | Age: 34
End: 2024-08-07

## 2024-08-07 DIAGNOSIS — M25.512 LEFT SHOULDER PAIN, UNSPECIFIED CHRONICITY: Primary | ICD-10-CM

## 2024-08-07 NOTE — TELEPHONE ENCOUNTER
New patient scheduled an appt with Sincer on 8/16 for left shoulder pain.    She believes she might have injured it while playing pickleball but isn't sure.    No xrays on file; Please enter orders.    Advised patient to arrive 20 min early for xrays on site.

## 2024-08-07 NOTE — TELEPHONE ENCOUNTER
X-Ray ordered and scheduled. MyChart sent.    Future Appointments   Date Time Provider Department Center   8/16/2024  7:05 AM WDR XR RM1 [facility] XRAY [facility] [place]   8/16/2024  7:20 AM Jose Alberto Ash, PA [id number]

## 2024-08-16 ENCOUNTER — OFFICE VISIT (OUTPATIENT)
Dept: ORTHOPEDICS CLINIC | Facility: CLINIC | Age: 34
End: 2024-08-16
Payer: COMMERCIAL

## 2024-08-16 ENCOUNTER — HOSPITAL ENCOUNTER (OUTPATIENT)
Dept: GENERAL RADIOLOGY | Age: 34
Discharge: HOME | End: 2024-08-16
Attending: PHYSICIAN ASSISTANT
Payer: COMMERCIAL

## 2024-08-16 VITALS — BODY MASS INDEX: 25.16 KG/M2 | WEIGHT: 142 LBS | HEIGHT: 63 IN

## 2024-08-16 DIAGNOSIS — M75.42 ROTATOR CUFF IMPINGEMENT SYNDROME OF LEFT SHOULDER: Primary | ICD-10-CM

## 2024-08-16 DIAGNOSIS — M25.512 LEFT SHOULDER PAIN, UNSPECIFIED CHRONICITY: ICD-10-CM

## 2024-08-16 PROCEDURE — 99203 OFFICE O/P NEW LOW 30 MIN: CPT | Performed by: PHYSICIAN ASSISTANT

## 2024-08-16 PROCEDURE — 3008F BODY MASS INDEX DOCD: CPT | Performed by: PHYSICIAN ASSISTANT

## 2024-08-16 PROCEDURE — 73030 X-RAY EXAM OF SHOULDER: CPT | Performed by: PHYSICIAN ASSISTANT

## 2024-08-16 RX ORDER — MELOXICAM 15 MG/1
15 TABLET ORAL DAILY
Qty: 14 TABLET | Refills: 1 | Status: SHIPPED | OUTPATIENT
Start: 2024-08-16

## 2024-08-16 NOTE — H&P
[facility] - ORTHOPEDICS  [address] [place] [id number]  [phone]     NEW PATIENT VISIT - HISTORY AND PHYSICAL EXAMINATION     Name: Helen Shankar   MRN: [id number]  Date: 8/16/2024     CC: Left shoulder pain.     REFERRED BY: Veena Richter MD    HPI:   Helen Shankar is a very pleasant 34 year old right-hand dominant female who presents today for evaluation, consultation, and management of left shoulder pain since end of June after a  - playing pickelball end of June. She felt a stretch- and had immediate pain 7 - she self referred for further evaluation and management.     PMH:   Past Medical History:    Anemia    Breast injury    June 2022 - hit on left breast with a tenins ball which caused bruising    Carpal tunnel syndrome    Right Wrist     Neutropenia, cyclic (HCC)    Nov 2018    Pap smear abnormality of cervix    hx of abnormal cells    Vertigo    follows with ENT       PAST SURGICAL HX:  Past Surgical History:   Procedure Laterality Date    Adenoidectomy      Create eardrum opening,gen anesth  as a child, unsure    put in and removed    Tonsillectomy      ear tubes as child    Upper gi endoscopy,exam  approx 2015    peptic ulcers       FAMILY HX:  Family History   Problem Relation Age of Onset    Breast Cancer Mother     Hypertension Mother     Cancer Mother 64        breast cancer    Clotting Disorder Mother     Other (hypothyroidism) Mother     Diabetes Father     Hypertension Father         A-Fib as well    Ear Problems Father     Heart Disorder Father     Heart Disorder Maternal Grandmother     Breast Cancer Paternal Grandmother     Diabetes Paternal Grandmother     Heart Disorder Paternal Grandmother     Cancer Paternal Grandmother         breast/ colon    Obesity Paternal Grandmother     Diabetes Paternal Grandfather     Heart Disorder Paternal Grandfather     Breast Cancer Paternal Aunt     Cancer Paternal Aunt         breast    Diabetes Paternal  Aunt     Diabetes Paternal Uncle     Heart Disorder Paternal Uncle        ALLERGIES:  Antihistamines, loratadine-type; Triprolidine-pse; and Ed a-hist pse    MEDICATIONS:   Current Outpatient Medications   Medication Sig Dispense Refill    Meloxicam 15 MG Oral Tab Take 1 tablet (15 mg total) by mouth daily. 14 tablet 1    venlafaxine ER 37.5 MG Oral Capsule SR 24 Hr Take 1 capsule (37.5 mg total) by mouth daily. 30 capsule 5    Prenatal Vit-Fe Fumarate-FA (MULTI PRENATAL) 27-0.8 MG Oral Tab Take 1 tablet by mouth.         ROS: A comprehensive 14 point review of systems was performed and was negative aside from the aforementioned per history of present illness.    SOCIAL HX:  Social History     Occupational History    Not on file   Tobacco Use    Smoking status: Never    Smokeless tobacco: Never   Vaping Use    Vaping status: Never Used   Substance and Sexual Activity    Alcohol use: Yes     Types: 2 Glasses of wine per week     Comment: 3 drinks/week    Drug use: Yes     Types: Cannabis    Sexual activity: Yes     Partners: Male     Birth control/protection: Vasectomy        PE:   Vitals:    08/16/24 0722   Weight: 142 lb (64.4 kg)   Height: 5' 3\" (1.6 m)     Estimated body mass index is 25.15 kg/m² as calculated from the following:    Height as of this encounter: 5' 3\" (1.6 m).    Weight as of this encounter: 142 lb (64.4 kg).    Physical Exam  Constitutional:       Appearance: Normal appearance.   HENT:      Head: Normocephalic and atraumatic.   Eyes:      Extraocular Movements: Extraocular movements intact.   Neck:      Musculoskeletal: Normal range of motion and neck supple.   Cardiovascular:      Pulses: Normal pulses.   Pulmonary:      Effort: Pulmonary effort is normal. No respiratory distress.   Abdominal:      General: There is no distension.   Skin:     General: Skin is warm.      Capillary Refill: Capillary refill takes less than 2 seconds.      Findings: No bruising.   Neurological:      General: No  focal deficit present.      Mental Status: Alert.   Psychiatric:         Mood and Affect: Mood normal.     Examination of the left shoulder demonstrates:     Skin is intact, warm and dry.   Cervical:  Full ROM  Spurling's  Negative    Deformity:   none  Atrophy:   none    Scapular winging: Negative    Palpation:     AC Joint  Negative  Biceps Tendon  Negative  Greater Tuberosity Negative    ROM:   Forward Flexion:  150°  Abduction:   full and symmetric  External Rotation:  full and symmetric  Internal Rotation:  full and symmetric    Rotator Cuff Strength:   Supraspinatus:   5/5  Subscapularis:   5/5  Infraspinatus/Teres: 5/5    Provocative Tests:   Cr:   Positive  Speed's:   Negative  Noble's:   Negative  Lift-off:   Negative  Apprehension:  Negative  Sulcus Sign:   Negative    Neurovascular Upper Extremity (Bilateral)  Motor:    5/5 EPL, Finger Abduction, , Pinch, Deltoid  Sensation:   intact to light touch median, ulnar, radial and axillary nerve  Circulation:   Normal, 2+ radial pulse    The contralateral upper extremity is without limitation in range of motion or strength, no positive provocative maneuvers.     Radiographic Examination/Diagnostics:    I personally viewed, independently interpreted and radiology report was reviewed.    X-ray, Left Shoulder 8/16/2024- no evidence of fracture, foreign body or soft tissue injury.     IMPRESSION: Helen Shankar is a 34 year old Right hand dominant female with left impingement.     PLAN:   We had a detailed discussion outlining the etiology, anatomy, pathophysiology, and natural history of the patient's findings. Imaging was reviewed in detail and correlated to a 3-dimensional model of the patient's pathology.     We reviewed the treatment of this disease condition.  Fortunately, treatment is amenable to conservative treatment which we chose to optimize at today's visit. We recommend Meloxicam 15mg.  We recommended physical therapy to aid in  strengthening, range of motion, functional improvement, and return to baseline activity.  The patient had the opportunity to ask questions and all questions were answered appropriately.      FOLLOW-UP:   Return to clinic in six weeks. No imaging required at next visit.           Sincer ISH Pascual, PA-C Orthopedic Surgery / Sports Medicine Specialist  [facility]  [address] [place] [address] [id number] [place] [address].org  [email or web address]  t: [phone]  o: [phone]  f: [phone]    This note was dictated using Dragon software.  While it was briefly proofread prior to completion, some grammatical, spelling, and word choice errors due to dictation may still occur.

## 2024-08-22 ENCOUNTER — TELEPHONE (OUTPATIENT)
Dept: ORTHOPEDICS CLINIC | Facility: CLINIC | Age: 34
End: 2024-08-22

## 2024-08-22 DIAGNOSIS — M25.551 RIGHT HIP PAIN: Primary | ICD-10-CM

## 2024-08-23 ENCOUNTER — OFFICE VISIT (OUTPATIENT)
Dept: ORTHOPEDICS CLINIC | Facility: CLINIC | Age: 34
End: 2024-08-23
Payer: COMMERCIAL

## 2024-08-23 ENCOUNTER — HOSPITAL ENCOUNTER (OUTPATIENT)
Dept: GENERAL RADIOLOGY | Age: 34
Discharge: HOME | End: 2024-08-23
Attending: PHYSICIAN ASSISTANT
Payer: COMMERCIAL

## 2024-08-23 VITALS — HEIGHT: 63 IN | BODY MASS INDEX: 25.16 KG/M2 | WEIGHT: 142 LBS

## 2024-08-23 DIAGNOSIS — M25.551 RIGHT HIP PAIN: ICD-10-CM

## 2024-08-23 DIAGNOSIS — M75.42 ROTATOR CUFF IMPINGEMENT SYNDROME OF LEFT SHOULDER: Primary | ICD-10-CM

## 2024-08-23 DIAGNOSIS — M24.551 HIP FLEXOR TIGHTNESS, RIGHT: ICD-10-CM

## 2024-08-23 PROCEDURE — 99213 OFFICE O/P EST LOW 20 MIN: CPT | Performed by: PHYSICIAN ASSISTANT

## 2024-08-23 PROCEDURE — 3008F BODY MASS INDEX DOCD: CPT | Performed by: PHYSICIAN ASSISTANT

## 2024-08-23 PROCEDURE — 73502 X-RAY EXAM HIP UNI 2-3 VIEWS: CPT | Performed by: PHYSICIAN ASSISTANT

## 2024-08-23 NOTE — H&P
[facility] - ORTHOPEDICS  [address] [place] [id number]  [phone]     NEW PATIENT VISIT - HISTORY AND PHYSICAL EXAMINATION     Name: Helen Shankar   MRN: [id number]  Date: 8/23/2024     CC: Right hip pain.    REFERRED BY: Veena Richter MD    HPI:   Helen Shankar is a very pleasant 34 year old female who presents today for evaluation, consultation, and management of right hip pain that started after running June and continue to get worse.  She describes the pain, 4-5 in nature and complains of stiffness and locking.  She is otherwise very active and works as a psychologist.       PMH:   Past Medical History:    Anemia    Breast injury    June 2022 - hit on left breast with a tenins ball which caused bruising    Carpal tunnel syndrome    Right Wrist     Neutropenia, cyclic (HCC)    Nov 2018    Pap smear abnormality of cervix    hx of abnormal cells    Vertigo    follows with ENT       PAST SURGICAL HX:  Past Surgical History:   Procedure Laterality Date    Adenoidectomy      Create eardrum opening,gen anesth  as a child, unsure    put in and removed    Tonsillectomy      ear tubes as child    Upper gi endoscopy,exam  approx 2015    peptic ulcers       FAMILY HX:  Family History   Problem Relation Age of Onset    Breast Cancer Mother     Hypertension Mother     Cancer Mother 64        breast cancer    Clotting Disorder Mother     Other (hypothyroidism) Mother     Diabetes Father     Hypertension Father         A-Fib as well    Ear Problems Father     Heart Disorder Father     Heart Disorder Maternal Grandmother     Breast Cancer Paternal Grandmother     Diabetes Paternal Grandmother     Heart Disorder Paternal Grandmother     Cancer Paternal Grandmother         breast/ colon    Obesity Paternal Grandmother     Diabetes Paternal Grandfather     Heart Disorder Paternal Grandfather     Breast Cancer Paternal Aunt     Cancer Paternal Aunt         breast    Diabetes Paternal Aunt      Diabetes Paternal Uncle     Heart Disorder Paternal Uncle        ALLERGIES:  Antihistamines, loratadine-type; Triprolidine-pse; and Ed a-hist pse    MEDICATIONS:   Current Outpatient Medications   Medication Sig Dispense Refill    venlafaxine ER 37.5 MG Oral Capsule SR 24 Hr Take 1 capsule (37.5 mg total) by mouth daily. 30 capsule 5    Prenatal Vit-Fe Fumarate-FA (MULTI PRENATAL) 27-0.8 MG Oral Tab Take 1 tablet by mouth.      Meloxicam 15 MG Oral Tab Take 1 tablet (15 mg total) by mouth daily. 14 tablet 1       ROS: A comprehensive 14 point review of systems was performed and was negative aside from the aforementioned per history of present illness.    SOCIAL HX:  Social History     Occupational History    Not on file   Tobacco Use    Smoking status: Never    Smokeless tobacco: Never   Vaping Use    Vaping status: Never Used   Substance and Sexual Activity    Alcohol use: Yes     Types: 2 Glasses of wine per week     Comment: 3 drinks/week    Drug use: Yes     Types: Cannabis    Sexual activity: Yes     Partners: Male     Birth control/protection: Vasectomy       PE:   Vitals:    08/23/24 0853   Weight: 142 lb (64.4 kg)   Height: 5' 3\" (1.6 m)     Estimated body mass index is 25.15 kg/m² as calculated from the following:    Height as of this encounter: 5' 3\" (1.6 m).    Weight as of this encounter: 142 lb (64.4 kg).    Physical Exam  Constitutional:       Appearance: Normal appearance.   HENT:      Head: Normocephalic and atraumatic.   Eyes:      Extraocular Movements: Extraocular movements intact.   Neck:      Musculoskeletal: Normal range of motion and neck supple.   Cardiovascular:      Pulses: Normal pulses.   Pulmonary:      Effort: Pulmonary effort is normal. No respiratory distress.   Abdominal:      General: There is no distension.   Skin:     General: Skin is warm.      Capillary Refill: Capillary refill takes less than 2 seconds.      Findings: No bruising.   Neurological:      General: No focal  deficit present.      Mental Status: Alert.   Psychiatric:         Mood and Affect: Mood normal.     Examination of the right hip demonstrates:     On physical examination patient is alert and oriented x3, in no apparent or acute distress.   Skin is intact, warm and dry.     The patient demonstrates a Antalgic gait.  Patient has extension to 0 degrees, flexion to 120 degrees.   The patient has 30 degrees  of internal rotation, and 25 degrees of external rotation.     On provocative examination, there is a Negative subspine, Negative trochanteric pain sign, and Negative FADIR and Negative SHELBIE testing.     There is a Negative log roll, circumduction clunk.     Negative Marcial test.     The patient has 4+/5 strength with hip flexion, 4+/5 with abduction and adduction bilaterally.   The patient has no tenderness over the ASIS, no tenderness at the hip flexor, no tenderness at the iliac crest, no at the ischium, no no at the greater trochanter, no at the SI joint.     There is no tenderness at over the adductor, pubic tubercle, or pain with resisted adduction.       No obvious peripheral edema noted.   Distal neurovascular exam demonstrates normal perfusion, intact sensation to light touch and preserved strength.     Examination of the contralateral hip demonstrates:  No significant atrophy, swelling or effusion. Full range of motion. Neurovascularly intact distally.    Radiographic Examination/Diagnostics:  I personally viewed, independently interpreted and radiology report was reviewed.      XR HIP W OR WO PELVIS 2 OR 3 VIEWS, RIGHT (CPT=73502)    Result Date: 8/23/2024  PROCEDURE:  XR HIP W OR WO PELVIS 2 OR 3 VIEWS, RIGHT ( CPT=73502)  TECHNIQUE:  Unilateral 2 to 3 views of the hip and pelvis if performed.  COMPARISON:  None.  INDICATIONS:  M25.551 Right hip pain  PATIENT STATED HISTORY: (As transcribed by Technologist)  Patient is having an orthopedic evaluation.  Patient complains of pain in her Right hip at the  level of her ASIS since June 2024 with no known injury.               CONCLUSION:   Negative for fracture or malalignment of the pelvis or hips.  Normal femoral head and acetabular morphology with preservation of hip joint spaces bilaterally.  Obturator rings are intact.  Normal sacroiliac joints and pubic symphysis.    LOCATION:  [facility]   Dictated by (CST): Rosanna Parra MD on 8/23/2024 at 9:09 AM     Finalized by (CST): Rosanna Parra MD on 8/23/2024 at 9:09 AM       XR SHOULDER, COMPLETE (MIN 2 VIEWS), LEFT (CPT=73030)    Result Date: 8/16/2024  PROCEDURE:  XR SHOULDER, COMPLETE (MIN 2 VIEWS), LEFT (CPT=73030)  TECHNIQUE:  Multiple views were obtained.  COMPARISON:  None.  INDICATIONS:  M25.512 Left shoulder pain, unspecified chronicity  PATIENT STATED HISTORY: (As transcribed by Technologist)  Patient states left shoulder pain since playing pickelball in June. She is here for orthopedic evaluation.    FINDINGS:  Bone mineralization within normal limits.  Osseous structures are intact.  Joint spaces are preserved.  No dislocation.            CONCLUSION:  1. Unremarkable radiographs of the left shoulder.   LOCATION:  [facility]   Dictated by (CST): Zena Hair MD on 8/16/2024 at 8:11 AM     Finalized by (CST): Zena Hair MD on 8/16/2024 at 8:11 AM         IMPRESSION: Helen Shankar is a 34 year old female who presents with right hip flexor tightness, and right hip pain/ASIS pain.     PLAN:   We had a detailed discussion outlining the etiology, anatomy, pathophysiology, and natural history of the patient's findings. Imaging was reviewed in detail and correlated to a 3-dimensional model of the patient's pathology.     We reviewed the treatment of this disease condition.  We recommended physical therapy to aid in strengthening, range of motion, functional improvement, and return to baseline activity.  The patient had the opportunity to ask questions and all questions were answered appropriately.      FOLLOW-UP:  Return to  clinic in eight weeks. No imaging required at next visit.             Sincer SILVANA Ash [facility], PA-C Orthopedic Surgery / Sports Medicine Specialist  [facility]  [address] [place] [address].org  [email or web address]  t: [phone]  o: [phone]  f: [phone]    This note was dictated using Dragon software.  While it was briefly proofread prior to completion, some grammatical, spelling, and word choice errors due to dictation may still occur.

## 2024-09-16 DIAGNOSIS — M75.42 ROTATOR CUFF IMPINGEMENT SYNDROME OF LEFT SHOULDER: ICD-10-CM

## 2024-09-17 RX ORDER — MELOXICAM 15 MG/1
15 TABLET ORAL DAILY
Qty: 14 TABLET | Refills: 1 | Status: SHIPPED | OUTPATIENT
Start: 2024-09-17

## 2024-09-17 NOTE — TELEPHONE ENCOUNTER
Meloxicam    DOS: n/a  Last OV: 8/23/24 Right hip pain  Last refill date: 8/16/24 #/refills: 14/0  Upcoming appt:   Future Appointments   Date Time Provider Department Center   10/18/2024  7:10 AM Jose Alberto Ash PA [email or web address]       Patient comment: I ran out and i was wondering if i could stay on it until my race in 5 weeks. Thanks!       Component      Latest Ref Rng 4/19/2024   Glucose      70 - 99 mg/dL 93    Sodium      136 - 145 mmol/L 143    Potassium      3.5 - 5.1 mmol/L 4.4    Chloride      98 - 112 mmol/L 109    Carbon Dioxide, Total      21.0 - 32.0 mmol/L 27.0    ANION GAP      0 - 18 mmol/L 7    BUN      9 - 23 mg/dL 11    CREATININE      0.55 - 1.02 mg/dL 0.74    BUN/CREATININE RATIO      10.0 - 20.0  14.9    CALCIUM      8.7 - 10.4 mg/dL 9.5    CALCULATED OSMOLALITY      275 - 295 mOsm/kg 295    EGFR      >=60 mL/min/1.73m2 108    Patient Fasting for BMP? Yes

## 2024-09-19 ENCOUNTER — HOSPITAL ENCOUNTER (OUTPATIENT)
Age: 34
Discharge: HOME | End: 2024-09-19
Payer: COMMERCIAL

## 2024-09-19 VITALS
HEIGHT: 63 IN | WEIGHT: 140 LBS | DIASTOLIC BLOOD PRESSURE: 61 MMHG | SYSTOLIC BLOOD PRESSURE: 108 MMHG | OXYGEN SATURATION: 98 % | HEART RATE: 70 BPM | RESPIRATION RATE: 18 BRPM | TEMPERATURE: 98 F | BODY MASS INDEX: 24.8 KG/M2

## 2024-09-19 DIAGNOSIS — H66.91 RIGHT OTITIS MEDIA, UNSPECIFIED OTITIS MEDIA TYPE: Primary | ICD-10-CM

## 2024-09-19 PROCEDURE — 99213 OFFICE O/P EST LOW 20 MIN: CPT

## 2024-09-19 RX ORDER — FLUTICASONE PROPIONATE 50 MCG
2 SPRAY, SUSPENSION (ML) NASAL DAILY
Qty: 16 G | Refills: 0 | Status: SHIPPED | OUTPATIENT
Start: 2024-09-19 | End: 2024-10-19

## 2024-09-19 NOTE — ED PROVIDER NOTES
Chief Complaint   Patient presents with    Ear Problem       HPI:     Helen Shankar is a 34 year old female with a history of recurrent ear infections who denies any other significant medical history presents to the immediate care for evaluation of 2-week history of nasal congestion and 2-day history of right ear discomfort.  Patient complains of mild discomfort in her left ear as well developing over the last day.  Patient denies any fevers.  Admits to mild cough.  Has been taking Tylenol over-the-counter with some relief.      PFSH    PFSH asessment screens reviewed  Nurses notes reviewed    Family History   Problem Relation Age of Onset    Breast Cancer Mother     Hypertension Mother     Cancer Mother 64        breast cancer    Clotting Disorder Mother     Other (hypothyroidism) Mother     Diabetes Father     Hypertension Father         A-Fib as well    Ear Problems Father     Heart Disorder Father     Heart Disorder Maternal Grandmother     Breast Cancer Paternal Grandmother     Diabetes Paternal Grandmother     Heart Disorder Paternal Grandmother     Cancer Paternal Grandmother         breast/ colon    Obesity Paternal Grandmother     Diabetes Paternal Grandfather     Heart Disorder Paternal Grandfather     Breast Cancer Paternal Aunt     Cancer Paternal Aunt         breast    Diabetes Paternal Aunt     Diabetes Paternal Uncle     Heart Disorder Paternal Uncle        Social History     Socioeconomic History    Marital status:      Spouse name: Not on file    Number of children: Not on file    Years of education: Not on file    Highest education level: Not on file   Occupational History    Not on file   Tobacco Use    Smoking status: Never    Smokeless tobacco: Never   Vaping Use    Vaping status: Never Used   Substance and Sexual Activity    Alcohol use: Yes     Types: 2 Glasses of wine per week     Comment: 3 drinks/week    Drug use: Yes     Types: Cannabis    Sexual activity: Yes     Partners: Male      Birth control/protection: Vasectomy   Other Topics Concern    Caffeine Concern No    Exercise Yes     Comment: daily    Seat Belt Yes    Special Diet Yes     Comment: gluten free    Stress Concern No    Weight Concern No     Service Not Asked    Blood Transfusions Not Asked    Occupational Exposure Not Asked    Hobby Hazards Not Asked    Sleep Concern Not Asked    Back Care Not Asked    Bike Helmet Not Asked    Self-Exams Not Asked   Social History Narrative    Not on file     Social Determinants of Health     Financial Resource Strain: Low Risk  (4/2/2023)    Financial Resource Strain     Difficulty of Paying Living Expenses: Not hard at all     Med Affordability: No   Food Insecurity: No Food Insecurity (4/2/2023)    Food Insecurity     Food Insecurity: Never true   Transportation Needs: No Transportation Needs (4/2/2023)    Transportation Needs     Lack of Transportation: No   Physical Activity: Not on file   Stress: No Stress Concern Present (4/2/2023)    Stress     Feeling of Stress : No   Social Connections: Not on file   Housing Stability: At Risk (12/15/2023)    Received from [employer]     Living Situation: Not on file     Housing Problems: Not on file         ROS:   Positive for stated complaint  All other systems reviewed and negative except as noted above.  Constitutional and Vital Signs Reviewed.      Physical Exam:     Findings:    /61   Pulse 70   Temp 97.8 °F (36.6 °C) (Temporal)   Resp 18   Ht 160 cm (5' 3\")   Wt 63.5 kg   LMP 08/29/2024 (Approximate)   SpO2 98%   BMI 24.80 kg/m²   GENERAL: alert, normal appearance, no distress.                 HEAD: Normocephalic, atraumatic.     EYES: Conjunctiva without injection, EOMs intact.                        EARS: External ears normal.  Left TM normal inspection, EAC normal.  Right TM erythematous and mildly bulging, right EAC normal.                NOSE: Normal external appearance.                    MOUTH: Moist mucous membranes.  No erythema or exudate                      NECK: supple.             CARDIO: Regular rate and rhythm              LUNGS: No respiratory distress, nonlabored respirations.  Lungs clear bilaterally, no wheeze, rhonchi, rales           MSK: Normal rom.     NEURO: Alert and oriented.       MDM/Assessment/Plan:   Orders for this encounter:    Orders Placed This Encounter    amoxicillin clavulanate 875-125 MG Oral Tab     Sig: Take 1 tablet by mouth 2 (two) times daily for 7 days.     Dispense:  14 tablet     Refill:  0    fluticasone propionate 50 MCG/ACT Nasal Suspension     Si sprays by Nasal route daily.     Dispense:  16 g     Refill:  0       Labs performed this visit:  No results found for this or any previous visit (from the past 10 hour(s)).    MDM:  34-year-old female here for 2-week history of nasal congestion and 2-day history of right ear pain.  Evidence of otitis media on exam, will begin Augmentin.  Also sent a prescription for Flonase.  Continue supportive care at home.  Follow-up with primary care early next week and present to ER for new or worsening symptoms.  Patient agreeable to treatment plan and follow-up, all questions answered prior to discharge.    Diagnosis:    ICD-10-CM    1. Right otitis media, unspecified otitis media type  H66.91           All results reviewed and discussed with patient.  See AVS for detailed discharge instructions for your condition today.    Follow Up with:  Veena Richter MD  [address] [place] [id number] [phone]      Follow-up with primary care early next week for recheck and present to ER for new or worsening symptoms or emergent symptoms              NOTE TO PATIENT:  The  Century Cures Act makes medical notes like these available to patients in the interest of transparency. However, be advised this is a medical document. It is intended as peer to peer communication. It is written in medical language  and may contain abbreviations or verbiage that are unfamiliar. It may appear blunt or direct. Medical documents are intended to carry relevant information, facts as evident, and the clinical opinion of the practitioner. .

## 2024-10-01 ENCOUNTER — MED REC SCAN ONLY (OUTPATIENT)
Facility: CLINIC | Age: 34
End: 2024-10-01

## 2024-10-18 ENCOUNTER — OFFICE VISIT (OUTPATIENT)
Dept: ORTHOPEDICS CLINIC | Facility: CLINIC | Age: 34
End: 2024-10-18
Payer: COMMERCIAL

## 2024-10-18 DIAGNOSIS — M24.551 HIP FLEXOR TIGHTNESS, RIGHT: ICD-10-CM

## 2024-10-18 DIAGNOSIS — M75.42 ROTATOR CUFF IMPINGEMENT SYNDROME OF LEFT SHOULDER: Primary | ICD-10-CM

## 2024-10-18 PROCEDURE — 99213 OFFICE O/P EST LOW 20 MIN: CPT | Performed by: PHYSICIAN ASSISTANT

## 2024-10-18 NOTE — PROGRESS NOTES
[facility] - ORTHOPEDICS  [address] [place] [id number]  [phone]       Name: Helen Shankar   MRN: [id number]  Date: 10/18/2024     REASON FOR VISIT: Follow up for right hip flexor tightness, and right hip pain/ASIS pain & left shoulder impingement.     INTERVAL HISTORY:  Helen Shankar is a 34 year old female who returns for evaluation of right hip flexor tightness, and right hip pain/ASIS pain & left shoulder impingement.     Overall, the patient is doing well.  1/10 pain. She has had great relief with manual therapy in PT.       ROS: ROS    PE:   There were no vitals filed for this visit.  Estimated body mass index is 24.8 kg/m² as calculated from the following:    Height as of 9/19/24: 5' 3\" (1.6 m).    Weight as of 9/19/24: 140 lb (63.5 kg).    Physical Exam  Constitutional:       Appearance: Normal appearance.   HENT:      Head: Normocephalic and atraumatic.   Eyes:      Extraocular Movements: Extraocular movements intact.   Neck:      Musculoskeletal: Normal range of motion and neck supple.   Cardiovascular:      Pulses: Normal pulses.   Pulmonary:      Effort: Pulmonary effort is normal. No respiratory distress.   Abdominal:      General: There is no distension.   Skin:     General: Skin is warm.      Capillary Refill: Capillary refill takes less than 2 seconds.      Findings: No bruising.   Neurological:      General: No focal deficit present.      Mental Status: She is alert.   Psychiatric:         Mood and Affect: Mood normal.       Examination of the left shoulder demonstrates:     Skin is intact, warm and dry.   Cervical:  Full ROM  Spurling's  Negative    Deformity:   none  Atrophy:   none    Scapular winging: Negative    Palpation:     AC Joint  Negative  Biceps Tendon  Negative  Greater Tuberosity Negative    ROM:   Forward Flexion:  full and symmetric  Abduction:   full and symmetric  External Rotation:  full and symmetric  Internal Rotation:  full and  symmetric    Rotator Cuff Strength:   Supraspinatus:   5/5  Subscapularis:   5/5  Infraspinatus/Teres: 5/5    Provocative Tests:   Cr:   Trace   Speed's:   Negative  Hamlin's:   Negative  Lift-off:   Negative  Apprehension:  Negative  Sulcus Sign:   Negative    Neurovascular Upper Extremity (Bilateral)  Motor:    5/5 EPL, Finger Abduction, , Pinch, Deltoid  Sensation:   intact to light touch median, ulnar, radial and axillary nerve  Circulation:   Normal, 2+ radial pulse    The contralateral upper extremity is without limitation in range of motion or strength, no positive provocative maneuvers.     Examination of the right hip demonstrates:     On physical examination patient is alert and oriented x3, in no apparent or acute distress.   Skin is intact, warm and dry.     The patient demonstrates an antalgic gait.  Patient has extension to 0 degrees, flexion to 120 degrees.   The patient has 30 degrees  of internal rotation, and 25 degrees of external rotation.     On provocative examination, there is a negative subspine, negative  trochanteric pain sign, and negative FADIR and negative SHELBIE testing.     There is a negative  log roll, circumduction clunk.     Negative Marcial test.     The patient has 4+/5 strength with hip flexion, 4+/5 with abduction and adduction bilaterally.     The patient has no tenderness over the ASIS, no tenderness at the hip flexor, no tenderness at the iliac crest, no at the ischium, no no at the greater trochanter, no at the SI joint.     There is no tenderness at over the adductor, pubic tubercle, or pain with resisted adduction.     No obvious peripheral edema noted.   Distal neurovascular exam demonstrates normal perfusion, intact sensation to light touch and preserved strength.     Examination of the contralateral hip demonstrates:  No significant atrophy, swelling or effusion. Full range of motion. Neurovascularly intact distally.      Radiographic Examination/Diagnostics:     I personally viewed, independently interpreted and radiology report was reviewed.    No results found.    IMPRESSION: Helen Shankar is a 34 year old female who presented for follow up of right hip flexor tightness, and right hip pain/ASIS pain & left shoulder impingement.     PLAN:   We had a detailed discussion outlining the etiology, anatomy, pathophysiology, and natural history of the patient's findings.    We reviewed the treatment of this disease condition.  She is improving and can continue physical therapy. We recommended physical therapy to aid in strengthening, range of motion, functional improvement, and return to baseline activity.  The patient had opportunity to ask questions and all questions were answered appropriately.    FOLLOW-UP:  Return to clinic in eight weeks. No imaging required at next visit.             Sincer SILVANA Ash, [facility], PA-C Orthopedic Surgery / Sports Medicine Specialist  [facility]  [address] [place] [address].org  [email or web address]  t: [phone]  o: [phone]  f: [phone]    This note was dictated using Dragon software.  While it was briefly proofread prior to completion, some grammatical, spelling, and word choice errors due to dictation may still occur.

## 2024-10-20 NOTE — TELEPHONE ENCOUNTER
Patient informed of recommendations. Call transferred to [facility] to schedule. without difficulty

## 2024-11-18 ENCOUNTER — OFFICE VISIT (OUTPATIENT)
Dept: INTERNAL MEDICINE CLINIC | Facility: CLINIC | Age: 34
End: 2024-11-18
Payer: COMMERCIAL

## 2024-11-18 VITALS
HEIGHT: 62.5 IN | BODY MASS INDEX: 24.76 KG/M2 | HEART RATE: 69 BPM | WEIGHT: 138 LBS | SYSTOLIC BLOOD PRESSURE: 100 MMHG | DIASTOLIC BLOOD PRESSURE: 60 MMHG | TEMPERATURE: 98 F | RESPIRATION RATE: 16 BRPM | OXYGEN SATURATION: 97 %

## 2024-11-18 DIAGNOSIS — Z00.00 ENCOUNTER FOR ROUTINE ADULT MEDICAL EXAMINATION: Primary | ICD-10-CM

## 2024-11-18 DIAGNOSIS — R00.2 PALPITATIONS: ICD-10-CM

## 2024-11-18 DIAGNOSIS — E55.9 HYPOVITAMINOSIS D: ICD-10-CM

## 2024-11-18 DIAGNOSIS — Z00.00 LABORATORY EXAM ORDERED AS PART OF ROUTINE GENERAL MEDICAL EXAMINATION: ICD-10-CM

## 2024-11-18 RX ORDER — MULTIVITAMIN
1 TABLET ORAL DAILY
COMMUNITY

## 2024-11-18 NOTE — PROGRESS NOTES
[facility] Internal Medicine Office Note  Chief Complaint:   Chief Complaint   Patient presents with    Physical       HPI:   This is a 34 year old female coming in for physical  HPI    Low vit D previously  She is not on any vit D currently    She gets occasional palpitations, unchanged from previous.   She had a zio monitor for eval last year and showed some PVCs and 5 beat run of nonsustained vtach     Chol at goal in 2024  Normal metabolic panel      Patient Active Problem List   Diagnosis    History of  premature rupture of membranes (PPROM)    Vestibular migraine    History of precipitous delivery    Family history of breast cancer     Past Surgical History:   Procedure Laterality Date    Adenoidectomy      Create eardrum opening,gen anesth  as a child, unsure    put in and removed    Tonsillectomy      ear tubes as child    Upper gi endoscopy,exam  approx     peptic ulcers     Family History   Problem Relation Age of Onset    Breast Cancer Mother     Hypertension Mother     Cancer Mother 64        breast cancer    Clotting Disorder Mother     Other (hypothyroidism) Mother     Diabetes Father     Hypertension Father         A-Fib as well    Ear Problems Father     Heart Disorder Father     Heart Disorder Maternal Grandmother     Breast Cancer Paternal Grandmother     Diabetes Paternal Grandmother     Heart Disorder Paternal Grandmother     Cancer Paternal Grandmother         breast/ colon    Obesity Paternal Grandmother     Diabetes Paternal Grandfather     Heart Disorder Paternal Grandfather     Breast Cancer Paternal Aunt     Cancer Paternal Aunt         breast    Diabetes Paternal Aunt     Diabetes Paternal Uncle     Heart Disorder Paternal Uncle         I reviewed her's Past Medical History, Past Surgical History, Family History and   Social History updated shows  Social History     Socioeconomic History    Marital status:    Tobacco Use    Smoking status: Never    Smokeless  tobacco: Never   Vaping Use    Vaping status: Never Used   Substance and Sexual Activity    Alcohol use: Yes     Types: 2 Glasses of wine per week     Comment: 3 drinks/week    Drug use: Yes     Types: Cannabis    Sexual activity: Yes     Partners: Male     Birth control/protection: Vasectomy   Other Topics Concern    Caffeine Concern No    Exercise Yes     Comment: daily    Seat Belt Yes    Special Diet Yes     Comment: gluten free    Stress Concern No    Weight Concern No     Social Drivers of Health     Financial Resource Strain: Low Risk  (4/2/2023)    Financial Resource Strain     Difficulty of Paying Living Expenses: Not hard at all     Med Affordability: No   Food Insecurity: No Food Insecurity (4/2/2023)    Food Insecurity     Food Insecurity: Never true   Transportation Needs: No Transportation Needs (4/2/2023)    Transportation Needs     Lack of Transportation: No   Stress: No Stress Concern Present (4/2/2023)    Stress     Feeling of Stress : No    Received from [facility]     Allergies:  Allergies[1]  Current Outpatient Medications   Medication Sig Dispense Refill    Multiple Vitamin (MULTI-VITAMIN) Oral Tab Take 1 tablet by mouth daily.      venlafaxine ER 37.5 MG Oral Capsule SR 24 Hr Take 1 capsule (37.5 mg total) by mouth daily. (Patient not taking: Reported on 9/19/2024) 30 capsule 5         REVIEW OF SYSTEMS:   Review of Systems   Constitutional:  Negative for fever.   HENT:  Negative for congestion.    Eyes:  Negative for visual disturbance.   Respiratory:  Negative for shortness of breath.    Cardiovascular:  Negative for chest pain.   Gastrointestinal:  Negative for constipation.   Genitourinary:  Negative for dysuria.   Neurological: Negative.    Hematological: Negative.    Psychiatric/Behavioral: Negative.          EXAM:   /60   Pulse 69   Temp 98.1 °F (36.7 °C) (Temporal)   Resp 16   Ht 5' 2.5\" (1.588 m)   Wt 138 lb (62.6 kg)   LMP 10/24/2024  (Approximate)   SpO2 97%   BMI 24.84 kg/m²  Estimated body mass index is 24.84 kg/m² as calculated from the following:    Height as of this encounter: 5' 2.5\" (1.588 m).    Weight as of this encounter: 138 lb (62.6 kg).   Vital signs reviewed. Appears stated age, well groomed.  Physical Exam  Vitals reviewed.   Constitutional:       General: She is not in acute distress.     Appearance: She is well-developed.   HENT:      Head: Normocephalic and atraumatic.      Right Ear: Tympanic membrane normal.      Left Ear: Tympanic membrane normal.   Eyes:      Conjunctiva/sclera: Conjunctivae normal.   Cardiovascular:      Rate and Rhythm: Normal rate and regular rhythm.      Heart sounds: Normal heart sounds.   Pulmonary:      Effort: Pulmonary effort is normal.      Breath sounds: Normal breath sounds.   Musculoskeletal:      Cervical back: Neck supple.      Right lower leg: No edema.      Left lower leg: No edema.   Skin:     General: Skin is warm and dry.   Neurological:      General: No focal deficit present.      Mental Status: She is alert.   Psychiatric:         Mood and Affect: Mood normal.          ASSESSMENT AND PLAN:   Helen Shankar is a 34 year old female with  1. Encounter for routine adult medical examination    2. Laboratory exam ordered as part of routine general medical examination    3. Hypovitaminosis D    4. Palpitations          The plan is as follows  Martha was seen today for physical.    Diagnoses and all orders for this visit:    Encounter for routine adult medical examination   -pap smear due 5/2026  -Tdap received in 2023  -received flu shot     Laboratory exam ordered as part of routine general medical examination  -     TSH W Reflex To Free T4; Future  -     CBC With Differential With Platelet; Future    Hypovitaminosis D - low in the past; recheck   -     Vitamin D [E]; Future    Palpitations - she notes symptoms are unchanged from previous. She had a zio patch monitor in 11/2023 which  showed no significant findings. Recommend follow up with cardiology if symptoms change or worsen. Check TSH and Hgb     Orders Placed This Encounter   Procedures    TSH W Reflex To Free T4    CBC With Differential With Platelet    Vitamin D [E]       Meds & Refills for this Visit:  Requested Prescriptions      No prescriptions requested or ordered in this encounter       Imaging & Consults:  None    Health Maintenance Due   Topic Date Due    Annual Physical  Never done    COVID-19 Vaccine (6 - 2024-25 season) 09/01/2024     Patient/Caregiver Education: Patient/Caregiver Education: There are no barriers to learning. Medical education done. Outcome: Patient verbalizes understanding. Patient is notified to call with any questions, complications, allergies, or worsening or changing symptoms.  Patient is to call with any side effects or complications from the treatments as a result of today.     Veena Richter MD         [1]   Allergies  Allergen Reactions    Antihistamines, Loratadine-Type PALPITATIONS     Palpitations    Triprolidine-Pse OTHER (SEE COMMENTS)     Tachycardia, as a child    palpatations  palpatations    Ed A-Hist Pse OTHER (SEE COMMENTS)     Tachycardia, as a child    palpatations

## 2024-11-18 NOTE — PATIENT INSTRUCTIONS
Blood work     Follow up with [facility] - follow up if symptoms change or worsen [phone]

## 2024-11-22 ENCOUNTER — LAB ENCOUNTER (OUTPATIENT)
Dept: LAB | Age: 34
End: 2024-11-22
Attending: INTERNAL MEDICINE
Payer: COMMERCIAL

## 2024-11-22 DIAGNOSIS — Z00.00 LABORATORY EXAM ORDERED AS PART OF ROUTINE GENERAL MEDICAL EXAMINATION: ICD-10-CM

## 2024-11-22 DIAGNOSIS — E55.9 HYPOVITAMINOSIS D: ICD-10-CM

## 2024-11-22 LAB
BASOPHILS # BLD AUTO: 0.07 X10(3) UL (ref 0–0.2)
BASOPHILS NFR BLD AUTO: 1.9 %
EOSINOPHIL # BLD AUTO: 0.23 X10(3) UL (ref 0–0.7)
EOSINOPHIL NFR BLD AUTO: 6.3 %
ERYTHROCYTE [DISTWIDTH] IN BLOOD BY AUTOMATED COUNT: 12.2 %
HCT VFR BLD AUTO: 38.5 %
HGB BLD-MCNC: 12.8 G/DL
IMM GRANULOCYTES # BLD AUTO: 0.01 X10(3) UL (ref 0–1)
IMM GRANULOCYTES NFR BLD: 0.3 %
LYMPHOCYTES # BLD AUTO: 1.76 X10(3) UL (ref 1–4)
LYMPHOCYTES NFR BLD AUTO: 48.1 %
MCH RBC QN AUTO: 30.1 PG (ref 26–34)
MCHC RBC AUTO-ENTMCNC: 33.2 G/DL (ref 31–37)
MCV RBC AUTO: 90.6 FL
MONOCYTES # BLD AUTO: 0.29 X10(3) UL (ref 0.1–1)
MONOCYTES NFR BLD AUTO: 7.9 %
NEUTROPHILS # BLD AUTO: 1.3 X10 (3) UL (ref 1.5–7.7)
NEUTROPHILS # BLD AUTO: 1.3 X10(3) UL (ref 1.5–7.7)
NEUTROPHILS NFR BLD AUTO: 35.5 %
PLATELET # BLD AUTO: 205 10(3)UL (ref 150–450)
RBC # BLD AUTO: 4.25 X10(6)UL
TSI SER-ACNC: 4.68 UIU/ML (ref 0.55–4.78)
VIT D+METAB SERPL-MCNC: 33.3 NG/ML (ref 30–100)
WBC # BLD AUTO: 3.7 X10(3) UL (ref 4–11)

## 2024-11-22 PROCEDURE — 82306 VITAMIN D 25 HYDROXY: CPT

## 2024-11-22 PROCEDURE — 84443 ASSAY THYROID STIM HORMONE: CPT

## 2024-11-22 PROCEDURE — 85025 COMPLETE CBC W/AUTO DIFF WBC: CPT

## 2025-02-21 ENCOUNTER — TELEMEDICINE (OUTPATIENT)
Dept: INTERNAL MEDICINE CLINIC | Facility: CLINIC | Age: 35
End: 2025-02-21
Payer: COMMERCIAL

## 2025-02-21 ENCOUNTER — TELEPHONE (OUTPATIENT)
Dept: INTERNAL MEDICINE CLINIC | Facility: CLINIC | Age: 35
End: 2025-02-21

## 2025-02-21 DIAGNOSIS — R05.1 ACUTE COUGH: ICD-10-CM

## 2025-02-21 DIAGNOSIS — J11.1 INFLUENZA: Primary | ICD-10-CM

## 2025-02-21 PROCEDURE — 99213 OFFICE O/P EST LOW 20 MIN: CPT | Performed by: INTERNAL MEDICINE

## 2025-02-21 RX ORDER — CODEINE PHOSPHATE AND GUAIFENESIN 10; 100 MG/5ML; MG/5ML
5 SOLUTION ORAL EVERY 6 HOURS PRN
Qty: 118 ML | Refills: 0 | Status: SHIPPED | OUTPATIENT
Start: 2025-02-21

## 2025-02-21 RX ORDER — OSELTAMIVIR PHOSPHATE 75 MG/1
75 CAPSULE ORAL 2 TIMES DAILY
Qty: 10 CAPSULE | Refills: 0 | Status: SHIPPED | OUTPATIENT
Start: 2025-02-21

## 2025-02-21 NOTE — TELEPHONE ENCOUNTER
Action Requested: Summary for Provider     []  Critical Lab, Recommendations Needed  [x] Need Additional Advice  []   FYI    []   Need Orders  [] Need Medications Sent to Pharmacy  []  Other     SUMMARY:     Spoke to patient who reports experiencing flu like symptoms that began on Tuesday 2/18. Patient states her daughter and her friend tested positive for influenza A. Patient reports congestion, fever 100.5 F, chills, runny nose, sore throat, cough with clear phlegm, stomach upset, and slight nausea. Patient states she had a fever last night of 101.5 F and took Nyquil and that helped bring down her temperature. Patient has not taken a COVID or flu test. Patient states she is experiencing similar symptoms as her daughter and friend. Patient states her friend gave her, her tamiflu prescription and is wondering if she should take it.       This RN recommended patient to not take her friends prescription and to try some home remedies such as staying hydrated, getting plenty of rest, use a humidifier, and try steam inhalation to help relieve the congestion. Advised patient that PCP and other providers do not have any appointment today and to go to a walk in clinic for further assessment. Patient verbalized understanding. Patient is wanting PCP recommendations.    Dr. Richter- Any further recommendations? Please advise. TY     Reason for call: Flu (Fever, congestion, chills, runny nose, sore throat, nausea)  Onset: Feb 18, 2025                    Patient ok with [employer] message

## 2025-02-21 NOTE — TELEPHONE ENCOUNTER
Patient is asking for advice.     Started feeling congested Tuesday evening, yesterday it got worse. Patient has a runny nose, sneezing, sore throat. Last night fever was 105. Daughter tested positive for flu A on Monday.     Is patient within the time frame to take Tamiflu? Her daughter has left over medication.

## 2025-02-25 ENCOUNTER — PATIENT MESSAGE (OUTPATIENT)
Dept: INTERNAL MEDICINE CLINIC | Facility: CLINIC | Age: 35
End: 2025-02-25

## 2025-04-14 ENCOUNTER — MED REC SCAN ONLY (OUTPATIENT)
Facility: CLINIC | Age: 35
End: 2025-04-14

## 2025-04-28 ENCOUNTER — OFFICE VISIT (OUTPATIENT)
Dept: OBGYN CLINIC | Facility: CLINIC | Age: 35
End: 2025-04-28
Payer: COMMERCIAL

## 2025-04-28 VITALS
DIASTOLIC BLOOD PRESSURE: 72 MMHG | SYSTOLIC BLOOD PRESSURE: 114 MMHG | HEART RATE: 86 BPM | WEIGHT: 137 LBS | BODY MASS INDEX: 24.58 KG/M2 | HEIGHT: 62.5 IN

## 2025-04-28 DIAGNOSIS — Z01.419 WELL WOMAN EXAM WITH ROUTINE GYNECOLOGICAL EXAM: Primary | ICD-10-CM

## 2025-04-28 DIAGNOSIS — N93.9 ABNORMAL UTERINE BLEEDING (AUB): ICD-10-CM

## 2025-04-28 DIAGNOSIS — Z12.4 CERVICAL CANCER SCREENING: ICD-10-CM

## 2025-04-28 DIAGNOSIS — Z11.3 ROUTINE SCREENING FOR STI (SEXUALLY TRANSMITTED INFECTION): ICD-10-CM

## 2025-04-28 DIAGNOSIS — N76.0 VAGINITIS AND VULVOVAGINITIS: ICD-10-CM

## 2025-04-28 PROCEDURE — 88175 CYTOPATH C/V AUTO FLUID REDO: CPT | Performed by: STUDENT IN AN ORGANIZED HEALTH CARE EDUCATION/TRAINING PROGRAM

## 2025-04-28 PROCEDURE — 87591 N.GONORRHOEAE DNA AMP PROB: CPT | Performed by: STUDENT IN AN ORGANIZED HEALTH CARE EDUCATION/TRAINING PROGRAM

## 2025-04-28 PROCEDURE — 87491 CHLMYD TRACH DNA AMP PROBE: CPT | Performed by: STUDENT IN AN ORGANIZED HEALTH CARE EDUCATION/TRAINING PROGRAM

## 2025-04-28 PROCEDURE — 81514 NFCT DS BV&VAGINITIS DNA ALG: CPT | Performed by: STUDENT IN AN ORGANIZED HEALTH CARE EDUCATION/TRAINING PROGRAM

## 2025-04-28 PROCEDURE — 87624 HPV HI-RISK TYP POOLED RSLT: CPT | Performed by: STUDENT IN AN ORGANIZED HEALTH CARE EDUCATION/TRAINING PROGRAM

## 2025-04-28 NOTE — PROGRESS NOTES
[facility]  Obstetrics and Gynecology  History & Physical    CC: Patient presents for a well woman exam     Subjective:     HPI: Helen Shankar is a 34 year old  female here for a well women exam. Patient reports overall doing well since last annual exam however would like to discuss abnormal uterine bleeding and abnormal vaginal discharge.  Patient states since birth of last child her cycles are more painful for the first 2 days. She also notices increased vaginal discharge daily that is clear to white, but does not appreciate any malodor or burning or itching.    Pt's mother had breast CA age 64 dx, multiple 2nd degree relatives with breast cancer  Pt is BRCA negative.     OB History:  OB History    Para Term  AB Living   3 3 2 1 0 3   SAB IAB Ectopic Multiple Live Births   0 0 0 0 3      # Outcome Date GA Lbr Oswaldo/2nd Weight Sex Type Anes PTL Lv   3 Term 23 39w1d 00:54 / 00:02 7 lb 8.6 oz (3.42 kg) F NORMAL SPONT Local N CLIFF      Complications: Group B beta strep +   2 Term 20 40w0d 01:53 / 00:02 7 lb 2.6 oz (3.25 kg) M NORMAL SPONT Local N CLIFF      Complications: Precipitous labor   1  17 34w4d 05:45 / 00:17 4 lb 15.9 oz (2.265 kg) F NORMAL SPONT Local Y CLIFF      Complications:  labor       Gyne History:  Hx Prior Abnormal Pap: Yes (not sure when)  Pap Date: 05/15/23  Pap Result Notes: wnl  Patient's last menstrual period was 2025 (exact date).    denies history of STDs (non-HPV).   Sexual history: Active? Yes,   Birth control? vasectomy    Meds:  Medications Ordered Prior to Encounter[1]    All:  Allergies[2]    PMH:  Past Medical History[3]    Immunization History:   Immunization History   Administered Date(s) Administered    Covid-19 Vaccine Moderna 100 mcg/0.5 ml 2021, 2021, 2021    Covid-19 Vaccine Moderna Bivalent 50mcg/0.5mL 12+ years 2022    DTAP 2011    FLULAVAL 6 months & older 0.5 ml Prefilled  syringe (94356) 10/23/2018, 12/10/2019, 09/21/2020, 10/06/2021    FLUZONE 6 months and older PFS 0.5 ml ([id number]) 10/03/2016, 10/23/2018, 09/21/2020, 10/06/2021, 11/11/2022    Flucelvax Influenza vaccine, trivalent (ccIIV3), 0.5mL IM 10/09/2023, 10/06/2024    HPV (Gardasil) 08/13/2007, 10/18/2007, 02/20/2008    Influenza 02/17/2015, 02/18/2015, 10/23/2018, 12/11/2019, 11/11/2022, 10/06/2024    Moderna Covid-19 Vaccine 50mcg/0.5ml 12yrs+ 10/09/2023    TDAP 12/31/2016, 06/19/2020, 01/23/2023       PSH:  Past Surgical History[4]    Social History:  Social History     Socioeconomic History    Marital status:      Spouse name: Not on file    Number of children: Not on file    Years of education: Not on file    Highest education level: Not on file   Occupational History    Not on file   Tobacco Use    Smoking status: Never    Smokeless tobacco: Never   Vaping Use    Vaping status: Never Used   Substance and Sexual Activity    Alcohol use: Yes     Types: 2 Glasses of wine per week     Comment: 3 drinks/week    Drug use: Yes     Types: Cannabis     Comment: infrequently, maybe 4x year    Sexual activity: Yes     Partners: Male     Birth control/protection: Vasectomy   Other Topics Concern    Caffeine Concern No    Exercise Yes     Comment: daily    Seat Belt Yes    Special Diet Yes     Comment: gluten free    Stress Concern No    Weight Concern No     Service Not Asked    Blood Transfusions Not Asked    Occupational Exposure Not Asked    Hobby Hazards Not Asked    Sleep Concern Not Asked    Back Care Not Asked    Bike Helmet Not Asked    Self-Exams Not Asked   Social History Narrative    Not on file     Social Drivers of Health     Food Insecurity: No Food Insecurity (4/2/2023)    Food Insecurity     Food Insecurity: Never true   Transportation Needs: No Transportation Needs (4/2/2023)    Transportation Needs     Lack of Transportation: No   Stress: No Stress Concern Present (4/2/2023)    Stress     Feeling of  Stress : No   Housing Stability: At Risk (12/15/2023)    Received from [facility]     Living Situation: Not on file     Housing Problems: Not on file         Patient feels unsafe or threatened?: no    Abuse: no physical, sexual or mental.     Family History:  Family History[5]    Health maintenance:  Mammogram (age 38 and q1-2yr): Baseline mammogram 10/2023 with bilateral breast US 2023; repeat in 3 years   Colonoscopy (age 43 and q10yr): n/a    Review of Systems:  General:  denies fevers, chills, fatigue and malaise  Breast:  denies rashes, skin changes, pain, lumps or discharge   Respiratory:  denies SOB, dyspnea, cough or wheezing  Cardiovascular:  denies chest pain, palpitations  GI: denies abdominal pain, diarrhea, constipation  :  denies dysuria, hematuria, increased urinary frequency.   Heme:  denies history of excessive bleeding or bruising      Objective:     Vitals:    25 0929   BP: 114/72   Pulse: 86   Weight: 137 lb (62.1 kg)   Height: 62.5\"         Body mass index is 24.66 kg/m².    General: AAO.NAD.   CVS exam: normal peripheral perfusion  Chest: non-labored breathing, no tachypnea   Breast: symmetric, no dominant or suspicious mass, no skin or nipple changes and no axillary adenopathy  Abdominal exam: soft, nontender, nondistended  Pelvic exam:   VULVA: normal appearing vulva with no masses, tenderness or lesions  PERINEUM:  normal appearing, no lesions   URETHRAL MEATUS:  normal appearing, no lesions   VAGINA: normal appearing vagina with normal color and discharge, no lesions  CERVIX: normal appearing cervix without discharge or lesions  UTERUS: uterus is normal size, shape, consistency and nontender  ADNEXA: normal adnexa in size, nontender and no masses  PERIRECTAL:  normal appearing, no lesions   Ext: non-tender, no edema    Assessment:     Helen Shankar is a 34 year old  female here for a well women exam.     Problem List[6]      Plan:     Cervical cancer  screening  - discussion held with the patient about ASCCP guidelines  - repeat pap smear collected today per pt request, STI screening collected today for GC/CT  Health maintenance  - encouraged to maintain weight at healthy BMI  - discussed importance of exercise and healthy eating  - self breast exam instructions provided   - bilateral screening mammogram recommendations discussed, due for repeat mammogram at 35 yo (2 years)   AUB  - Pelvic US ordered  - AUB panel ordered  - d/w pt NSAIDs for cyclic pelvic pain with cycles  Abnormal Discharge  - vaginitis panel collected      Problem List Items Addressed This Visit    None  Visit Diagnoses         Well woman exam with routine gynecological exam    -  Primary      Cervical cancer screening        Relevant Orders    ThinPrep PAP Smear    Hpv Dna  High Risk , Thin Prep Collect      Routine screening for STI (sexually transmitted infection)        Relevant Orders    Chlamydia/Gc Amplification      Vaginitis and vulvovaginitis        Relevant Orders    Vaginitis Vaginosis PCR Panel      Abnormal uterine bleeding (AUB)        Relevant Orders    US PELVIS (TRANSABDOMINAL AND TRANSVAGINAL) (CPT=76856/84318)    CBC With Differential With Platelet    TSH W Reflex To Free T4    Prolactin    Hemoglobin A1C                  All of the findings and plan were discussed with the patient.  She notes understanding and agrees with the plan of care.  All questions were answered to the best of my ability at this time.      RTC in 1 year for a well woman exam or sooner if needed     Lauren Dominguez DO   [facility] - OBGYN      Discussed with patient that there will not be further notification of normal or benign results other than receiving results on [employer]. A Mobile Experience message or telephone call will be placed by the physician and/or office staff if results are abnormal.       Note to patient and family   The 21st Century Cures Act makes medical notes available to patients in the interest  of transparency.  However, please be advised that this is a medical document.  It is intended as qwcq-ml-svvg communication.  It is written and medical language may contain abbreviations or verbiage that are technical and unfamiliar.  It may appear blunt or direct.  Medical documents are intended to carry relevant information, facts as evident, and the clinical opinion of the practitioner.      This note could include assistance by Dragon voice recognition. Errors in content may be related to improper recognition by the system; efforts to review and correct have been done but errors may still exist.          [1]   Current Outpatient Medications on File Prior to Visit   Medication Sig Dispense Refill    Multiple Vitamin (MULTI-VITAMIN) Oral Tab Take 1 tablet by mouth daily.      oseltamivir (TAMIFLU) 75 MG Oral Cap Take 1 capsule (75 mg total) by mouth 2 (two) times daily. (Patient not taking: Reported on 4/28/2025) 10 capsule 0    guaiFENesin-codeine (GUAIFENESIN AC) 100-10 MG/5ML Oral Solution Take 5 mL by mouth every 6 (six) hours as needed for cough. 118 mL 0    venlafaxine ER 37.5 MG Oral Capsule SR 24 Hr Take 1 capsule (37.5 mg total) by mouth daily. (Patient not taking: Reported on 9/19/2024) 30 capsule 5     No current facility-administered medications on file prior to visit.   [2]   Allergies  Allergen Reactions    Antihistamines, Loratadine-Type PALPITATIONS     Palpitations    Triprolidine-Pse OTHER (SEE COMMENTS)     Tachycardia, as a child    palpatations  palpatations    Ed A-Hist Pse OTHER (SEE COMMENTS)     Tachycardia, as a child    palpatations   [3]   Past Medical History:   Anemia    Balance disorder    Related to dizziness    Breast injury    June 2022 - hit on left breast with a tenins ball which caused bruising    Carpal tunnel syndrome    Right Wrist     Dizziness    Ear infection    As a child, had ear tubes and tubes surgically removed, 1/y    Eye disease    Benign unilateral apraxia of the  eyelid    Neutropenia, cyclic (HCC)    2018    Pap smear abnormality of cervix    hx of abnormal cells    Vertigo    follows with ENT   [4]   Past Surgical History:  Procedure Laterality Date    Adenoidectomy      Colonoscopy  approx     sig scope    Create eardrum opening,gen anesth  as a child, unsure    put in and removed    Myringotomy, laser-assisted        2017 & 2020 & 23    Tonsillectomy      ear tubes as child    Upper gi endoscopy,exam  approx     peptic ulcers   [5]   Family History  Problem Relation Age of Onset    Breast Cancer Mother     Hypertension Mother     Cancer Mother 64        breast cancer    Clotting Disorder Mother     Other (hypothyroidism) Mother     Obesity Mother     Diabetes Father     Hypertension Father         A-Fib as well    Ear Problems Father     Heart Disorder Father         A-fib    Heart Disease Father         A fib    Heart Disorder Maternal Grandmother     Dementia Maternal Grandmother     Stroke Maternal Grandmother     Heart Disorder Maternal Grandfather     Breast Cancer Paternal Grandmother     Diabetes Paternal Grandmother     Heart Disorder Paternal Grandmother     Cancer Paternal Grandmother         breast/ colon    Obesity Paternal Grandmother     Colon Cancer Paternal Grandmother     Diabetes Paternal Grandfather     Heart Disorder Paternal Grandfather         heart attack    Breast Cancer Paternal Aunt     Cancer Paternal Aunt         breast    Diabetes Paternal Aunt     Breast Cancer Paternal Aunt     Diabetes Paternal Uncle     Heart Disorder Paternal Uncle     Cancer Maternal Uncle         Skin cancer in mouth   [6]   Patient Active Problem List  Diagnosis    History of  premature rupture of membranes (PPROM)    Vestibular migraine    History of precipitous delivery    Family history of breast cancer

## 2025-04-29 ENCOUNTER — PATIENT MESSAGE (OUTPATIENT)
Dept: OBGYN CLINIC | Facility: CLINIC | Age: 35
End: 2025-04-29

## 2025-04-29 ENCOUNTER — LAB ENCOUNTER (OUTPATIENT)
Dept: LAB | Age: 35
End: 2025-04-29
Attending: STUDENT IN AN ORGANIZED HEALTH CARE EDUCATION/TRAINING PROGRAM
Payer: COMMERCIAL

## 2025-04-29 DIAGNOSIS — N93.9 ABNORMAL UTERINE BLEEDING (AUB): ICD-10-CM

## 2025-04-29 LAB
BASOPHILS # BLD AUTO: 0.06 X10(3) UL (ref 0–0.2)
BASOPHILS NFR BLD AUTO: 1.3 %
BV BACTERIA DNA VAG QL NAA+PROBE: POSITIVE
C GLABRATA DNA VAG QL NAA+PROBE: NEGATIVE
C KRUSEI DNA VAG QL NAA+PROBE: NEGATIVE
C TRACH DNA SPEC QL NAA+PROBE: NEGATIVE
CANDIDA DNA VAG QL NAA+PROBE: NEGATIVE
EOSINOPHIL # BLD AUTO: 0.18 X10(3) UL (ref 0–0.7)
EOSINOPHIL NFR BLD AUTO: 3.9 %
ERYTHROCYTE [DISTWIDTH] IN BLOOD BY AUTOMATED COUNT: 12.4 %
EST. AVERAGE GLUCOSE BLD GHB EST-MCNC: 97 MG/DL (ref 68–126)
HBA1C MFR BLD: 5 % (ref ?–5.7)
HCT VFR BLD AUTO: 36.7 % (ref 35–48)
HGB BLD-MCNC: 12.4 G/DL (ref 12–16)
HPV E6+E7 MRNA CVX QL NAA+PROBE: NEGATIVE
IMM GRANULOCYTES # BLD AUTO: 0 X10(3) UL (ref 0–1)
IMM GRANULOCYTES NFR BLD: 0 %
LYMPHOCYTES # BLD AUTO: 1.94 X10(3) UL (ref 1–4)
LYMPHOCYTES NFR BLD AUTO: 41.5 %
MCH RBC QN AUTO: 31.3 PG (ref 26–34)
MCHC RBC AUTO-ENTMCNC: 33.8 G/DL (ref 31–37)
MCV RBC AUTO: 92.7 FL (ref 80–100)
MONOCYTES # BLD AUTO: 0.46 X10(3) UL (ref 0.1–1)
MONOCYTES NFR BLD AUTO: 9.9 %
N GONORRHOEA DNA SPEC QL NAA+PROBE: NEGATIVE
NEUTROPHILS # BLD AUTO: 2.03 X10 (3) UL (ref 1.5–7.7)
NEUTROPHILS # BLD AUTO: 2.03 X10(3) UL (ref 1.5–7.7)
NEUTROPHILS NFR BLD AUTO: 43.4 %
PLATELET # BLD AUTO: 217 10(3)UL (ref 150–450)
PROLACTIN SERPL-MCNC: 3.4 NG/ML
RBC # BLD AUTO: 3.96 X10(6)UL (ref 3.8–5.3)
T VAGINALIS DNA VAG QL NAA+PROBE: NEGATIVE
TSI SER-ACNC: 3.15 UIU/ML (ref 0.55–4.78)
WBC # BLD AUTO: 4.7 X10(3) UL (ref 4–11)

## 2025-04-29 PROCEDURE — 84443 ASSAY THYROID STIM HORMONE: CPT | Performed by: STUDENT IN AN ORGANIZED HEALTH CARE EDUCATION/TRAINING PROGRAM

## 2025-04-29 PROCEDURE — 85025 COMPLETE CBC W/AUTO DIFF WBC: CPT | Performed by: STUDENT IN AN ORGANIZED HEALTH CARE EDUCATION/TRAINING PROGRAM

## 2025-04-29 PROCEDURE — 83036 HEMOGLOBIN GLYCOSYLATED A1C: CPT | Performed by: STUDENT IN AN ORGANIZED HEALTH CARE EDUCATION/TRAINING PROGRAM

## 2025-04-29 PROCEDURE — 84146 ASSAY OF PROLACTIN: CPT | Performed by: STUDENT IN AN ORGANIZED HEALTH CARE EDUCATION/TRAINING PROGRAM

## 2025-04-29 RX ORDER — CLINDAMYCIN PHOSPHATE 20 MG/G
1 CREAM VAGINAL NIGHTLY
Qty: 40 G | Refills: 0 | Status: SHIPPED | OUTPATIENT
Start: 2025-04-29 | End: 2025-05-06

## 2025-04-29 RX ORDER — METRONIDAZOLE 500 MG/1
500 TABLET ORAL 2 TIMES DAILY
Qty: 28 TABLET | Refills: 0 | Status: SHIPPED | OUTPATIENT
Start: 2025-04-29 | End: 2025-05-06

## 2025-04-30 NOTE — TELEPHONE ENCOUNTER
Please see patient's response regarding BV & advise if patient needs further STI testing & can continue using tampons.

## 2025-05-16 ENCOUNTER — HOSPITAL ENCOUNTER (OUTPATIENT)
Dept: ULTRASOUND IMAGING | Age: 35
Discharge: HOME | End: 2025-05-16
Attending: STUDENT IN AN ORGANIZED HEALTH CARE EDUCATION/TRAINING PROGRAM
Payer: COMMERCIAL

## 2025-05-16 DIAGNOSIS — N93.9 ABNORMAL UTERINE BLEEDING (AUB): ICD-10-CM

## 2025-05-16 PROCEDURE — 76856 US EXAM PELVIC COMPLETE: CPT | Performed by: STUDENT IN AN ORGANIZED HEALTH CARE EDUCATION/TRAINING PROGRAM

## 2025-05-16 PROCEDURE — 76830 TRANSVAGINAL US NON-OB: CPT | Performed by: STUDENT IN AN ORGANIZED HEALTH CARE EDUCATION/TRAINING PROGRAM

## 2025-08-19 ENCOUNTER — PATIENT MESSAGE (OUTPATIENT)
Dept: INTERNAL MEDICINE CLINIC | Facility: CLINIC | Age: 35
End: 2025-08-19

## 2025-08-19 DIAGNOSIS — H57.9 EYE PROBLEM: Primary | ICD-10-CM

## (undated) NOTE — MR AVS SNAPSHOT
[address], [facility] [address] [address]  [phone]               Thank you for choosing us for your health care visit with Stephanie Torres MD.  We are glad to serve you and happy to provide you with this summary discharge instructions in [employer] by going to Visits < Admission Summaries. If you've been to the Emergency Department or your doctor's office, you can view your past visit information in [employer] by going to Visits < Visit Summaries. ANDalyze questions?

## (undated) NOTE — MR AVS SNAPSHOT
[address], [facility] [address] [id number] [phone]               Thank you for choosing us for your health care visit with Sindi Owens MD.  We are glad to serve you and happy to provide you with this sum - drospirenone-ethinyl estradiol 3-0.03 MG Tabs            MyChart     Visit [employer]  You can access your MyChart to more actively manage your health care and view more details from this visit by going to [email or web address].   If you've recently

## (undated) NOTE — LETTER
20    To whom it may concern, regarding appeal # [id number]:   Dayron Tamayo,  86, is a patient under our care.  She has a history of  delivery of a canales pregnancy at 29 4/7 weeks and is currently pregnant again with a canales

## (undated) NOTE — LETTER
Patient Name: Jun Quintanilla  : 1990  MRN: [id number]  Patient Address: [address] [place] [id number]      COVID-19 2022      [place] [address] is committed to the safety and well-being of our patients, members, employ These treatments, when available and appropriate, should be given as soon as possible after diagnosis.       Seek Further Care      If you are awaiting test results or are confirmed positive for COVID-19, and your symptoms worsen at home with symptoms such doorknobs. Use household cleaning sprays or wipes according to the label instructions. Post-Discharge Follow-up  Please call your primary care provider within two days of your discharge to arrange for a telehealth follow-up.  CDC does not recommend Control & Prevention (CDC)  10 things you can do to manage your health at home, [email or web address]. pdf  [email or web address]

## (undated) NOTE — MR AVS SNAPSHOT
[facility]  [address] [facility] [address]  [phone]               Thank you for choosing us for your health care visit with Kate Veloz.   We are glad to serve you and happy to provide you with this summary ounces per week    Supplementation:   As long as fortification is needed, feed 2 oz or more expressed milk with a wide based, slow flow nipple and bottle.      Paced bottle feeding using a slow flow nipple:   · Hold your baby in an upright position, support recent med list.                Breast Pump Misc   Double electric breast pump. docusate sodium 50 MG Caps   Take 50 mg by mouth 2 (two) times daily.    Commonly known as:  COLACE           Norethindrone 0.35 MG Tabs   Take 1 tablet (0.35 mg total

## (undated) NOTE — ED AVS SNAPSHOT
[facility] in [address] [place] [address] Drive,[address]Th Floor    600 [facility]    Phone:  [phone]    Fax:  Ariel Rivera   MRN: [id number]    Department:  [facility] END   Date of Visit:  4/25/2017 Maile Farmer, [address] [id number]     Phone:  [phone]    - ibuprofen 600 MG Tabs  - nystatin 058903 UNIT/GM Oint              Discharge Instructions         NSAID medications: Non-Steroidal Anti-Inflammatory Drugs    These in a substitute for ongoing medical care. Often, one Immediate Care visit does not uncover every injury or illness.  If you have been referred to a primary care or a specialist physician for a follow-up visit, please tell this physician (or your personal docto Jose Kiran [facility] [facility] ([address]) [phone]                Additional Information       We are concerned for your overall well being:    - If you are a smoker or have smoked in the last 12 months, we encourage you to explore options for [employer]

## (undated) NOTE — MR AVS SNAPSHOT
[address] [id number] [phone]               Thank you for choosing us for your health care visit with AVELINA Daly.   We are glad to serve you and happy to provide you with this sum Return in about 1 week (around 1/31/2017), or if symptoms worsen or fail to improve, for Follow up.          MyChart     Visit MyChart  You can access your MyChart to more actively manage your health care and view more details from this visit by going to h

## (undated) NOTE — IP AVS SNAPSHOT
[facility] [place] [address] [place], [address] ~ [phone]                Discharge Summary   1/18/2017    [address]            Admission Information        Provider Department    1/18/2017 Efren Epperson MD  2sw-J Follow up with Rebeca Degroot MD In 6 weeks.     Specialty:  OBSTETRICS & GYNECOLOGY    Contact information:    [address]  [phone]        Future Appointments     Jan 26, 2017 10:15 AM   OB Follow Up with Dayami Spencer 0.62 (H) (01/19/17)  0.10 (01/19/17)  0.06    (11/11/16)  62.4 (11/11/16)  26.8 (11/11/16)  7.8 (11/11/16)  1.8 (11/11/16)  0.7  (11/11/16)  3.75 (11/11/16)  1.61 (11/11/16)  0.47 (11/11/16)  0.11 (11/11/16)  0.04      Pending Labs     Order Current Status In the next few days, a nurse may call you to see how you and baby are doing. In the next few weeks you may be mailed a survey from us asking you to rate your experience here at [facility].   We value our patient's feedback, and would appreciate you taking a m

## (undated) NOTE — MR AVS SNAPSHOT
After Visit Summary   11/8/2021    Rossy Klein   MRN: [id number]           Visit Information     Date & Time  11/8/2021  2:30 PM Provider  AVELINA Coulter Department  [facility] 26, [address]  Dept.  Phone  [phone] [facility]      Follow-up Instructions    Return in about 1 year (around 11/8/2022). Instructions      Birth Control: IUD (Intrauterine Device)    The IUD (intrauterine device) is small, flexible, and T-shaped.  A trained healthcare p symptoms may be more severe.     What to report to your healthcare provider  Be sure your healthcare provider knows if you have:  · A sexually transmitted infection (STI) or possible STI  · Liver problems  · Blood clots (for progestin IUD only)  · Breast ca

## (undated) NOTE — MR AVS SNAPSHOT
[facility] [address], [address]  [phone]               Thank you for choosing us for your health care visit with Oscar Ulloa MD.  We are glad to serve you and happy to provide you with If our office is closed, the answering service will page the doctor on-call. [facility]  [phone]       Follow Up with Our Office     Return in about 2 weeks (around 1/26/2017).       Your Appointments     Jan 26, 2017 10:15 AM   OB

## (undated) NOTE — LETTER
Patient Name: Awais Dickens  YOB: 1990          MRN :  [id number]  Date:  9/5/2023  Referring Physician:  Romulo Cowan    Discharge Summary  Pt has attended 9 visits in Physical Therapy. Diagnosis:   Pelvic floor weakness (N81.89)      Referring Provider: Yue Hills  Date of Evaluation:    6/20/2023    Precautions:  None Next MD visit:   none scheduled  Date of Surgery: n/a      Subjective:   Abdominal mm feeling more strong. Walking a few times/week 1/2 mile each. Planning on doing Tonal work out with friend. Got abdominal binder and wants to make sure she is wearing correctly as well as when to wear. No vaginal pinching when sitting anymore. Some times has to push around rectum when having #1-2 bowel. Less pain with intercourse. Pt reports that she feels confident to continue strategies to resolve pain with intercourse. No increased frequency/urgency or vaginal pressure. No leakage, nocturia. Fitness goals: returning to walking, 1/2 AND FULL MARATHON-PT REPORTS THAT SHE FEELS CONFIDENT TO PROGRESS WALKING>RUNNING    PFDI-20: 42.8/300 (was:70.84/300); Impairment= 14% (was:24 %)-8/22/2023     Pain: 0/10-neck, 0/10 back, 2/10 vaginal region      Objective:  Tx flow sheet     Diastasis Recti: (finger width depth while contracted)  Above Umbilicus:  1 1/2 (was:3)  Umbilicus: 1 1/2 (was:3)  Below Umbilicus: 1/2 (was:3)    URINARY HABITS  Types of symptoms: RESOLVED (WAS:increased urgency)  Urinary Frequency: 3 hours  Post void dribble: NO (WAS:yes)  Hovering: NO (WAS:yes)  Empty bladder just in case: NO (WAS:yes)      BOWEL HABITS  Types of symptoms: NO increased urgency   Stool consistency: Reading Stool Scale: VARIABLE#1-3 (was:2-3)    SEXUAL HEALTH STATUS  Marinoff Scale:   x     Discomfort that does not affect completion  Sexual [place] Status: active  Pain with initial and/or deep penetration: both    Posture: MORE NEUTRAL POSITIONING-INCREASED SCAPULAR RETRACTION/DEPRESSION AND PELVIS, DECREASED FHP (WAS:rounded shoulders, increased lumbar lordosis)    External Palpation: B lumbar paraspinal tightness-RESOLVED  Abdominal Wall Integrity: + DR      Range Of Motion  Lumbar AROM screen: wfl except extension 20 degrees-pt reports that she gets dizzy if goes back further  LE AROM screen: grossly WNL   Pelvic Alignment: symmetrical (was:L ant ilium, L LE functionally longer)    Strength (MMT) 5/5 DELBERT LE except hip abd and ext B 5-/5 (WAS:L 4-/5, hip abd and ext R 4/5)  Transverse Abdominis: 2/5 (WAS:1/5)    Flexibility Summary: WNL DELBERT LE except hamstrings -10 90/90    Informed consent for internal pelvic evaluation given: Yes    External Observation:   Voluntary contraction: present   Voluntary relaxation: present  Involuntary contraction: present  Involuntary relaxation: present    Mons pubis: WNL  Labia majora: WNL  Labia minora:  WNL  Urethral meatus: WNL  Introitus: other: WNL (WAS:tenderness)  Perineal body: WNL (WAS:tenderness)    Internal Examination   Pelvic Floor Muscle strength: (PERF= Power/Endurance/Reps/Fast) MMT: 3/6/6/6 (WAS:2/3/3/3)    Tissue Laxity Test:  Anterior Wall: Min  Posterior Wall: WNL  Apical: WNL    Eccentric lengthening contraction: wnl  Bearing down Valsalva maneuver (2-3x): + cystocele    Internal Palpation: WNL except   Superficial Transverse Perineal - RESOLUTION (WAS:minimal) restriction and tenderness  Bulbocavernosus R>L RESOLUTION (WAS: minimal) restriction and tenderness  Ischiocavernosus R>L RESOLUTION (WAS:minimal) restriction and tenderness  Deep Transverse Perineal - RESOLUTION (WAS:minimal) restriction and tenderness  Urethrovaginalis R>L RESOLUTION (WAS: minimal) restriction and tenderness  Pubococcygeus/Pubovaginalis R>L RESOLUTION (WAS:minimal) restriction and tenderness  Iliococcygeus R>L RESOLUTION (WAS:moderate) restriction and tenderness  Coccygeus R>L RESOLUTION (WAS:moderate) restriction and tenderness    7/18/2023   Biofeedback: Recruitment good, holding ability fair with tonic good with phasic, derecruitment fair with tonic good with phasic, baseline between contractions 2.0Ua, baseline resting average flatlineUa (normal 2.0Ua)      Assessment:   Pt has made significant improvement in physical therapy with improved bowel/bladder habits, resolution pelvic floor muscles tension after Manual therapy which pt plans to continue to assist with less pain/resolve with intercourse with increased abdominal and hip strength, and function with preventing leakage. Pt appears to understand incorporating dilator/pelvic wand and abdominal binder to address pain and to improve support, respectively, with activities of daily living , including intercourse. All goals met. Pt motivated to continue HEP. Goals:   Goals: (to be met in 10 visits)-  1. Independent in HEP and progression with improved understanding of bladder/bowel health, bladder retraining and long-term management. -MET  2. Patient will have resolution of pelvic floor muscle tension to assist with </=2/10 with intercourse-MET  3. Patient will demonstrate improve PFM isolation, coordination and strength so she is able to reduce urinary urge, decrease pelvic pressure and prevent leakage during ADL's.-MET  4. Pt will have increased transverse abdominis muscle strength to 2/5 and hip strength to 5/5 to assist with supporting pelvic floor muscles. -MET  5. PFM contraction before increase intra-abdominal pressure. -MET    Plan: Pt considered discharged from physical therapy. Pt instructed to call clinic if he/she has any further questions and to continue home exercise program.     Patient/Family/Caregiver was advised of these findings, precautions, and treatment options and has agreed to actively participate in planning and for this course of care. Thank you for your referral. If you have any questions, please contact me at Dept: [phone].     Sincerely,  Electronically signed by therapist: Dominique Dill Froy Gayle, [facility] Act Notice to Patient: Medical documents like this are made available to patients in the interest of transparency. However, be advised this is a medical document and it is intended as ptrj-im-cuax communication between your medical providers. This medical document may contain abbreviations, assessments, medical data, and results or other terms that are unfamiliar. Medical documents are intended to carry relevant information, facts as evident, and the clinical opinion of the practitioner. As such, this medical document may be written in language that appears blunt or direct. You are encouraged to contact your medical provider and/or [facility] Patient Experience if you have any questions about this medical document.